# Patient Record
Sex: MALE | Race: WHITE
[De-identification: names, ages, dates, MRNs, and addresses within clinical notes are randomized per-mention and may not be internally consistent; named-entity substitution may affect disease eponyms.]

---

## 2018-02-21 ENCOUNTER — HOSPITAL ENCOUNTER (INPATIENT)
Dept: HOSPITAL 17 - HDOC | Age: 78
LOS: 6 days | Discharge: HOME | DRG: 812 | End: 2018-02-27
Attending: HOSPITALIST | Admitting: HOSPITALIST
Payer: COMMERCIAL

## 2018-02-21 VITALS
HEART RATE: 80 BPM | TEMPERATURE: 98 F | SYSTOLIC BLOOD PRESSURE: 94 MMHG | OXYGEN SATURATION: 98 % | RESPIRATION RATE: 20 BRPM | DIASTOLIC BLOOD PRESSURE: 57 MMHG

## 2018-02-21 VITALS
DIASTOLIC BLOOD PRESSURE: 81 MMHG | OXYGEN SATURATION: 100 % | TEMPERATURE: 98 F | RESPIRATION RATE: 20 BRPM | SYSTOLIC BLOOD PRESSURE: 114 MMHG | HEART RATE: 79 BPM

## 2018-02-21 VITALS
TEMPERATURE: 98.3 F | RESPIRATION RATE: 20 BRPM | DIASTOLIC BLOOD PRESSURE: 76 MMHG | OXYGEN SATURATION: 97 % | HEART RATE: 69 BPM | SYSTOLIC BLOOD PRESSURE: 132 MMHG

## 2018-02-21 VITALS
SYSTOLIC BLOOD PRESSURE: 125 MMHG | TEMPERATURE: 96.7 F | RESPIRATION RATE: 18 BRPM | OXYGEN SATURATION: 97 % | HEART RATE: 69 BPM | DIASTOLIC BLOOD PRESSURE: 81 MMHG

## 2018-02-21 VITALS
SYSTOLIC BLOOD PRESSURE: 112 MMHG | DIASTOLIC BLOOD PRESSURE: 63 MMHG | TEMPERATURE: 98.3 F | RESPIRATION RATE: 18 BRPM | OXYGEN SATURATION: 97 % | HEART RATE: 71 BPM

## 2018-02-21 VITALS
OXYGEN SATURATION: 95 % | DIASTOLIC BLOOD PRESSURE: 67 MMHG | SYSTOLIC BLOOD PRESSURE: 120 MMHG | RESPIRATION RATE: 20 BRPM | HEART RATE: 70 BPM | TEMPERATURE: 98.6 F

## 2018-02-21 VITALS
DIASTOLIC BLOOD PRESSURE: 76 MMHG | TEMPERATURE: 98.3 F | OXYGEN SATURATION: 97 % | HEART RATE: 71 BPM | RESPIRATION RATE: 20 BRPM | SYSTOLIC BLOOD PRESSURE: 132 MMHG

## 2018-02-21 VITALS
TEMPERATURE: 98.2 F | SYSTOLIC BLOOD PRESSURE: 119 MMHG | OXYGEN SATURATION: 99 % | HEART RATE: 78 BPM | DIASTOLIC BLOOD PRESSURE: 67 MMHG | RESPIRATION RATE: 18 BRPM

## 2018-02-21 VITALS
DIASTOLIC BLOOD PRESSURE: 74 MMHG | HEART RATE: 77 BPM | OXYGEN SATURATION: 97 % | SYSTOLIC BLOOD PRESSURE: 119 MMHG | TEMPERATURE: 98.6 F | RESPIRATION RATE: 18 BRPM

## 2018-02-21 VITALS
SYSTOLIC BLOOD PRESSURE: 122 MMHG | RESPIRATION RATE: 20 BRPM | TEMPERATURE: 97.8 F | DIASTOLIC BLOOD PRESSURE: 75 MMHG | OXYGEN SATURATION: 98 % | HEART RATE: 81 BPM

## 2018-02-21 VITALS — HEIGHT: 67 IN | BODY MASS INDEX: 28.75 KG/M2 | WEIGHT: 183.2 LBS

## 2018-02-21 VITALS — HEART RATE: 70 BPM

## 2018-02-21 VITALS — HEART RATE: 79 BPM

## 2018-02-21 VITALS — HEART RATE: 69 BPM

## 2018-02-21 DIAGNOSIS — N17.9: ICD-10-CM

## 2018-02-21 DIAGNOSIS — Z95.1: ICD-10-CM

## 2018-02-21 DIAGNOSIS — I25.10: ICD-10-CM

## 2018-02-21 DIAGNOSIS — D12.3: ICD-10-CM

## 2018-02-21 DIAGNOSIS — I25.2: ICD-10-CM

## 2018-02-21 DIAGNOSIS — M10.9: ICD-10-CM

## 2018-02-21 DIAGNOSIS — E11.9: ICD-10-CM

## 2018-02-21 DIAGNOSIS — D12.2: ICD-10-CM

## 2018-02-21 DIAGNOSIS — I35.0: ICD-10-CM

## 2018-02-21 DIAGNOSIS — K92.2: ICD-10-CM

## 2018-02-21 DIAGNOSIS — I50.9: ICD-10-CM

## 2018-02-21 DIAGNOSIS — D50.0: Primary | ICD-10-CM

## 2018-02-21 DIAGNOSIS — I11.0: ICD-10-CM

## 2018-02-21 DIAGNOSIS — E78.5: ICD-10-CM

## 2018-02-21 DIAGNOSIS — Z87.891: ICD-10-CM

## 2018-02-21 DIAGNOSIS — Z79.84: ICD-10-CM

## 2018-02-21 LAB
BASOPHILS # BLD AUTO: 0.1 TH/MM3 (ref 0–0.2)
BASOPHILS NFR BLD: 0.8 % (ref 0–2)
BUN SERPL-MCNC: 29 MG/DL (ref 7–18)
CALCIUM SERPL-MCNC: 8.7 MG/DL (ref 8.5–10.1)
CHLORIDE SERPL-SCNC: 103 MEQ/L (ref 98–107)
CREAT SERPL-MCNC: 1.84 MG/DL (ref 0.6–1.3)
EOSINOPHIL # BLD: 0.1 TH/MM3 (ref 0–0.4)
EOSINOPHIL NFR BLD: 0.9 % (ref 0–4)
ERYTHROCYTE [DISTWIDTH] IN BLOOD BY AUTOMATED COUNT: 21 % (ref 11.6–17.2)
GFR SERPLBLD BASED ON 1.73 SQ M-ARVRAT: 36 ML/MIN (ref 89–?)
GLUCOSE SERPL-MCNC: 119 MG/DL (ref 74–106)
HCO3 BLD-SCNC: 25.4 MEQ/L (ref 21–32)
HCT VFR BLD CALC: 18.8 % (ref 39–51)
HCT VFR BLD CALC: 18.9 % (ref 39–51)
HGB BLD-MCNC: 5.5 GM/DL (ref 13–17)
HGB BLD-MCNC: 5.6 GM/DL (ref 13–17)
INR PPP: 1.3 RATIO
LYMPHOCYTES # BLD AUTO: 0.6 TH/MM3 (ref 1–4.8)
LYMPHOCYTES NFR BLD AUTO: 7.3 % (ref 9–44)
MCH RBC QN AUTO: 22.1 PG (ref 27–34)
MCHC RBC AUTO-ENTMCNC: 29.4 % (ref 32–36)
MCV RBC AUTO: 75.2 FL (ref 80–100)
MONOCYTE #: 0.6 TH/MM3 (ref 0–0.9)
MONOCYTES NFR BLD: 7.5 % (ref 0–8)
NEUTROPHILS # BLD AUTO: 6.5 TH/MM3 (ref 1.8–7.7)
NEUTROPHILS NFR BLD AUTO: 83.5 % (ref 16–70)
PLATELET # BLD: 215 TH/MM3 (ref 150–450)
PMV BLD AUTO: 9.7 FL (ref 7–11)
PROTHROMBIN TIME: 12.7 SEC (ref 9.8–11.6)
RBC # BLD AUTO: 2.51 MIL/MM3 (ref 4.5–5.9)
SODIUM SERPL-SCNC: 138 MEQ/L (ref 136–145)
WBC # BLD AUTO: 7.8 TH/MM3 (ref 4–11)

## 2018-02-21 PROCEDURE — 82728 ASSAY OF FERRITIN: CPT

## 2018-02-21 PROCEDURE — 83615 LACTATE (LD) (LDH) ENZYME: CPT

## 2018-02-21 PROCEDURE — 85014 HEMATOCRIT: CPT

## 2018-02-21 PROCEDURE — 82805 BLOOD GASES W/O2 SATURATION: CPT

## 2018-02-21 PROCEDURE — 86920 COMPATIBILITY TEST SPIN: CPT

## 2018-02-21 PROCEDURE — 86850 RBC ANTIBODY SCREEN: CPT

## 2018-02-21 PROCEDURE — 86900 BLOOD TYPING SEROLOGIC ABO: CPT

## 2018-02-21 PROCEDURE — 86901 BLOOD TYPING SEROLOGIC RH(D): CPT

## 2018-02-21 PROCEDURE — 94664 DEMO&/EVAL PT USE INHALER: CPT

## 2018-02-21 PROCEDURE — P9016 RBC LEUKOCYTES REDUCED: HCPCS

## 2018-02-21 PROCEDURE — 30233N1 TRANSFUSION OF NONAUTOLOGOUS RED BLOOD CELLS INTO PERIPHERAL VEIN, PERCUTANEOUS APPROACH: ICD-10-PCS | Performed by: HOSPITALIST

## 2018-02-21 PROCEDURE — 82272 OCCULT BLD FECES 1-3 TESTS: CPT

## 2018-02-21 PROCEDURE — 36600 WITHDRAWAL OF ARTERIAL BLOOD: CPT

## 2018-02-21 PROCEDURE — 85025 COMPLETE CBC W/AUTO DIFF WBC: CPT

## 2018-02-21 PROCEDURE — 71045 X-RAY EXAM CHEST 1 VIEW: CPT

## 2018-02-21 PROCEDURE — 85610 PROTHROMBIN TIME: CPT

## 2018-02-21 PROCEDURE — 83540 ASSAY OF IRON: CPT

## 2018-02-21 PROCEDURE — 82607 VITAMIN B-12: CPT

## 2018-02-21 PROCEDURE — 93005 ELECTROCARDIOGRAM TRACING: CPT

## 2018-02-21 PROCEDURE — 85730 THROMBOPLASTIN TIME PARTIAL: CPT

## 2018-02-21 PROCEDURE — 88305 TISSUE EXAM BY PATHOLOGIST: CPT

## 2018-02-21 PROCEDURE — 36430 TRANSFUSION BLD/BLD COMPNT: CPT

## 2018-02-21 PROCEDURE — 82948 REAGENT STRIP/BLOOD GLUCOSE: CPT

## 2018-02-21 PROCEDURE — 80053 COMPREHEN METABOLIC PANEL: CPT

## 2018-02-21 PROCEDURE — 83550 IRON BINDING TEST: CPT

## 2018-02-21 PROCEDURE — 88312 SPECIAL STAINS GROUP 1: CPT

## 2018-02-21 PROCEDURE — 80048 BASIC METABOLIC PNL TOTAL CA: CPT

## 2018-02-21 PROCEDURE — C9113 INJ PANTOPRAZOLE SODIUM, VIA: HCPCS

## 2018-02-21 PROCEDURE — 83880 ASSAY OF NATRIURETIC PEPTIDE: CPT

## 2018-02-21 PROCEDURE — 85018 HEMOGLOBIN: CPT

## 2018-02-21 RX ADMIN — CARVEDILOL SCH MG: 6.25 TABLET, FILM COATED ORAL at 20:52

## 2018-02-21 RX ADMIN — INSULIN ASPART SCH: 100 INJECTION, SOLUTION INTRAVENOUS; SUBCUTANEOUS at 20:49

## 2018-02-21 NOTE — PD.CONS
HPI


Service


[facility]


Consult Requested By





Primary Care Physician


Non-Staff


Diagnoses:  


History of Present Illness


Mr. Cho is a 77-year-old male.  He has a history of coronary artery disease 

and aortic stenosis.  He was sent to his cardiologist due to complaints of 

recurrent dizziness on exertion and chest tightness.  She is found to have a 

hemoglobin of 5.5.  Patient reports a previous history of anemia.  He's 

uncertain of any previous cause such as GI bleed, bone marrow suppression, or 

blood cell destruction.  He cannot recall any bright red blood in his stools or 

any change in normal color and consistency.  Stool guaiac has been ordered and 

is pending.  Blood transfusion has been ordered and is in process.  Other 

medical conditions are diabetes mellitus type 2, hypertension, hyperlipidemia, 

and gout.  Evidence of acute kidney injury is present but could be related to 

anemia.





Review of Systems


Constitutional:  COMPLAINS OF: Fatigue, DENIES: Fever, Chills, Night Sweats


Eyes:  DENIES: Blurred vision, Diplopia, Eye inflammation


Respiratory:  COMPLAINS OF: Shortness of breath, DENIES: Cough, Wheezing, 

Hemoptysis


Cardiovascular:  COMPLAINS OF: Chest pain, Palpitations, Syncope, Dyspnea on 

Exertion


Gastrointestinal:  DENIES: Abdominal pain, Black stools, Bloody stools, 

Constipation, Diarrhea, Nausea, Vomiting


Musculoskeletal:  DENIES: Joint pain, Muscle aches, Stiffness


Integumentary:  COMPLAINS OF: Abnormal pigmentation, DENIES: Nail changes, 

Pruritus, Rash


Hematologic/lymphatic:  DENIES: Bruising, Lymphadenopathy


Immunologic/allergic:  DENIES: Eczema, Urticaria


Neurologic:  DENIES: Abnormal gait, Headache, Paresthesias


Psychiatric:  DENIES: Anxiety, Confusion, Hallucinations





Past Family Social History


Allergies:  


Coded Allergies:  


     No Known Allergies (Verified  Allergy, Unknown, 2/21/18)


Past Medical History


Anemia


Diabetes mellitus type 2


Hypertension


Hyperlipidemia


Gout


Aortic stenosis


Coronary artery disease


Past Surgical History


History of heart catheter


Reported Medications





Reported Meds & Active Scripts


Active


Reported


Vitamin B-12 (Cyanocobalamin) 1,000 Mcg Tab 1,000 Mcg PO DAILY


Simvastatin 80 Mg Tab 80 Mg PO DAILY


Metformin (Metformin HCl) 500 Mg Tab 500 Mg PO DAILY


     With a meal


Lisinopril 5 Mg Tab 5 Mg PO DAILY


Furosemide 20 Mg Tab 20 Mg PO DAILY


Folic Acid 0.4 Mg Tab 400 Mcg PO DAILY


Ferrous Sulfate 325 Mg (65 Mg Iron) Tablet 325 Mg PO DAILY


Colchicine 0.6 Mg Cap 0.5 Mg PO DAILY


Carvedilol 6.25 Mg Tab 6.25 Mg PO BID


Aspirin 81 Mg Chew 81 Mg CHEW DAILY


Family History


Diabetes mellitus type 2 and patient's mother


Social History


Distant history of smoking, patient quit in 1950 (at age 10) and does not 

presently smoke


No alcohol abuse


No illicit drug abuse





Physical Exam


Vital Signs





Vital Signs








  Date Time  Temp Pulse Resp B/P (MAP) Pulse Ox O2 Delivery O2 Flow Rate FiO2


 


2/21/18 16:28 98.6 70 20 120/67 95   


 


2/21/18 16:00  79      


 


2/21/18 15:52 98.2 78 18 119/67 99   


 


2/21/18 15:43 97.8 81 20 122/75 98   


 


2/21/18 14:14 98.0 79 20 114/81 (92) 100   


 


2/21/18 09:30 98.0 80 20 94/57 (69) 98   








Physical Exam


GENERAL: This is a well-nourished, well-developed patient, in no apparent 

distress.


SKIN: No rashes, ecchymoses or lesions. Cool and dry.


HEAD: Atraumatic. Normocephalic. No temporal or scalp tenderness.


EYES: Pupils equal round and reactive. Extraocular motions intact. No scleral 

icterus. No injection or drainage. 


ENT: Nose without bleeding, purulent drainage or septal hematoma. Throat 

without erythema, tonsillar hypertrophy or exudate. Uvula midline. Airway 

patent.


NECK: Trachea midline. No JVD or lymphadenopathy. Supple, nontender, no 

meningeal signs.


CARDIOVASCULAR: Regular rate and rhythm without murmurs, gallops, or rubs. 


RESPIRATORY: Clear to auscultation. Breath sounds equal bilaterally. No wheezes

, rales, or rhonchi.  


GASTROINTESTINAL: Abdomen soft, non-tender, nondistended. No hepato-splenomegaly

, or palpable masses. No guarding.


MUSCULOSKELETAL: Extremities without clubbing, cyanosis, or edema. No joint 

tenderness, effusion, or edema noted. No calf tenderness. Negative Homans sign 

bilaterally.


NEUROLOGICAL: Awake and alert. Cranial nerves II through XII intact.  Motor and 

sensory grossly within normal limits. Five out of 5 muscle strength in all 

muscle groups.  Normal speech.


Laboratory





Laboratory Tests








Test


  2/21/18


09:13 2/21/18


09:45


 


White Blood Count 7.8  


 


Red Blood Count 2.51  


 


Hemoglobin 5.6  5.5 


 


Hematocrit 18.9  18.8 


 


Mean Corpuscular Volume 75.2  


 


Mean Corpuscular Hemoglobin 22.1  


 


Mean Corpuscular Hemoglobin


Concent 29.4 


  


 


 


Red Cell Distribution Width 21.0  


 


Platelet Count 215  


 


Mean Platelet Volume 9.7  


 


Neutrophils (%) (Auto) 83.5  


 


Lymphocytes (%) (Auto) 7.3  


 


Monocytes (%) (Auto) 7.5  


 


Eosinophils (%) (Auto) 0.9  


 


Basophils (%) (Auto) 0.8  


 


Neutrophils # (Auto) 6.5  


 


Lymphocytes # (Auto) 0.6  


 


Monocytes # (Auto) 0.6  


 


Eosinophils # (Auto) 0.1  


 


Basophils # (Auto) 0.1  


 


CBC Comment DIFF FINAL  


 


Differential Comment   


 


Prothrombin Time 12.7  


 


Prothromb Time International


Ratio 1.3 


  


 


 


Activated Partial


Thromboplast Time 24.4 


  


 


 


Blood Urea Nitrogen 29  


 


Creatinine 1.84  


 


Random Glucose 119  


 


Calcium Level 8.7  


 


Sodium Level 138  


 


Potassium Level 4.7  


 


Chloride Level 103  


 


Carbon Dioxide Level 25.4  


 


Anion Gap 10  


 


Estimat Glomerular Filtration


Rate 36 


  


 








Result Diagram:  


2/21/18 0945                                                                   

             2/21/18 0913








Assessment and Plan


Problem List:  


(1) Severe anemia


ICD Code:  D64.9 - Anemia, unspecified


Assessment and Plan


77-year-old male admitted due to chest symptoms, with finding of severe anemia.





Severe anemia


Stool guaiac pending


Determine if patient is losing blood


If GI losses are determined then consult GI


Stool testing is negative for blood consult hematology for further workup in 

regards to chemotherapy lysis versus insufficient bone marrow production


Transfuse 2 units packed red blood cells


CBC in a.m.


Transfuse further packed red blood cells as needed


Follow CBC


This degree of anemia is likely responsible for his recent cardiac symptoms (

dizziness, dyspnea on exertion, chest tightness, increased edema)





Aortic stenosis


Coronary artery disease


(patient is uncertain today if he has a history of CHF, prior MI, or any 

history of arrhythmia)


Cardiology following


No change to baseline treatments


Cardiac symptoms are likely related primarily to anemia in the setting of 

underlying coronary artery disease





Diabetes mellitus type 2


Follow blood sugars


Insulin sliding scale


Diabetic diet





Hypertension


Continue baseline treatment


Follow blood pressures


Adjust treatments as needed





Hyperlipidemia


Continue present treatment


Follow as an outpatient





Gout


No change in baseline treatment


No exacerbations


Follow clinically





DVT prophylaxis


SCDs


Avoid blood thinners for now due to degree of anemia











Mateo Gonzalez MD Feb 21, 2018 18:06

## 2018-02-21 NOTE — EKG
Date Performed: 02/21/2018       Time Performed: 09:35:50

 

PTAGE:      77 years

 

EKG:      Sinus rhythm 

 

. LVH with secondary repolarization abnormality Extensive ST-T changes are probably due to ventricula
r hypertrophy Abnormal ECG 

 

NO PREVIOUS TRACING            

 

DOCTOR:   Lázaro Salazar  Interpretating Date/Time  02/21/2018 15:03:21

## 2018-02-22 VITALS
RESPIRATION RATE: 18 BRPM | TEMPERATURE: 98.1 F | HEART RATE: 70 BPM | OXYGEN SATURATION: 97 % | DIASTOLIC BLOOD PRESSURE: 68 MMHG | SYSTOLIC BLOOD PRESSURE: 121 MMHG

## 2018-02-22 VITALS — TEMPERATURE: 97.5 F | RESPIRATION RATE: 20 BRPM | HEART RATE: 79 BPM | OXYGEN SATURATION: 95 %

## 2018-02-22 VITALS
OXYGEN SATURATION: 97 % | RESPIRATION RATE: 16 BRPM | SYSTOLIC BLOOD PRESSURE: 123 MMHG | TEMPERATURE: 98.4 F | HEART RATE: 70 BPM | DIASTOLIC BLOOD PRESSURE: 63 MMHG

## 2018-02-22 VITALS
SYSTOLIC BLOOD PRESSURE: 130 MMHG | RESPIRATION RATE: 16 BRPM | HEART RATE: 70 BPM | OXYGEN SATURATION: 94 % | TEMPERATURE: 98.6 F | DIASTOLIC BLOOD PRESSURE: 78 MMHG

## 2018-02-22 VITALS
OXYGEN SATURATION: 97 % | SYSTOLIC BLOOD PRESSURE: 124 MMHG | TEMPERATURE: 97.8 F | RESPIRATION RATE: 18 BRPM | DIASTOLIC BLOOD PRESSURE: 64 MMHG

## 2018-02-22 VITALS
TEMPERATURE: 98 F | HEART RATE: 72 BPM | RESPIRATION RATE: 18 BRPM | OXYGEN SATURATION: 97 % | SYSTOLIC BLOOD PRESSURE: 119 MMHG | DIASTOLIC BLOOD PRESSURE: 60 MMHG

## 2018-02-22 VITALS
RESPIRATION RATE: 22 BRPM | SYSTOLIC BLOOD PRESSURE: 113 MMHG | DIASTOLIC BLOOD PRESSURE: 75 MMHG | TEMPERATURE: 97 F | HEART RATE: 81 BPM | OXYGEN SATURATION: 98 %

## 2018-02-22 VITALS
DIASTOLIC BLOOD PRESSURE: 68 MMHG | SYSTOLIC BLOOD PRESSURE: 121 MMHG | TEMPERATURE: 98.1 F | RESPIRATION RATE: 18 BRPM | OXYGEN SATURATION: 97 % | HEART RATE: 71 BPM

## 2018-02-22 VITALS
DIASTOLIC BLOOD PRESSURE: 62 MMHG | TEMPERATURE: 98.1 F | HEART RATE: 86 BPM | SYSTOLIC BLOOD PRESSURE: 126 MMHG | RESPIRATION RATE: 18 BRPM | OXYGEN SATURATION: 98 %

## 2018-02-22 VITALS
RESPIRATION RATE: 20 BRPM | TEMPERATURE: 98.8 F | HEART RATE: 82 BPM | DIASTOLIC BLOOD PRESSURE: 75 MMHG | OXYGEN SATURATION: 96 % | SYSTOLIC BLOOD PRESSURE: 134 MMHG

## 2018-02-22 VITALS — HEART RATE: 77 BPM

## 2018-02-22 VITALS — HEART RATE: 68 BPM

## 2018-02-22 VITALS — HEART RATE: 84 BPM

## 2018-02-22 VITALS — HEART RATE: 75 BPM

## 2018-02-22 VITALS — HEART RATE: 74 BPM

## 2018-02-22 LAB
% SATURATION IRON PROFILE: 26.1 % (ref 20–50)
ALBUMIN SERPL-MCNC: 3.4 GM/DL (ref 3.4–5)
ALP SERPL-CCNC: 68 U/L (ref 45–117)
ALT SERPL-CCNC: 19 U/L (ref 12–78)
AST SERPL-CCNC: 16 U/L (ref 15–37)
BASOPHILS # BLD AUTO: 0.1 TH/MM3 (ref 0–0.2)
BASOPHILS NFR BLD: 0.7 % (ref 0–2)
BILIRUB SERPL-MCNC: 2.2 MG/DL (ref 0.2–1)
BUN SERPL-MCNC: 28 MG/DL (ref 7–18)
CALCIUM SERPL-MCNC: 8.1 MG/DL (ref 8.5–10.1)
CHLORIDE SERPL-SCNC: 105 MEQ/L (ref 98–107)
CREAT SERPL-MCNC: 1.68 MG/DL (ref 0.6–1.3)
EOSINOPHIL # BLD: 0.1 TH/MM3 (ref 0–0.4)
EOSINOPHIL NFR BLD: 0.8 % (ref 0–4)
ERYTHROCYTE [DISTWIDTH] IN BLOOD BY AUTOMATED COUNT: 20.6 % (ref 11.6–17.2)
GFR SERPLBLD BASED ON 1.73 SQ M-ARVRAT: 40 ML/MIN (ref 89–?)
GLUCOSE SERPL-MCNC: 114 MG/DL (ref 74–106)
HCO3 BLD-SCNC: 24.7 MEQ/L (ref 21–32)
HCT VFR BLD CALC: 24.2 % (ref 39–51)
HGB BLD-MCNC: 7.7 GM/DL (ref 13–17)
IRON (FE): 82 MCG/DL (ref 65–175)
LYMPHOCYTES # BLD AUTO: 0.8 TH/MM3 (ref 1–4.8)
LYMPHOCYTES NFR BLD AUTO: 8.6 % (ref 9–44)
MCH RBC QN AUTO: 24.7 PG (ref 27–34)
MCHC RBC AUTO-ENTMCNC: 31.8 % (ref 32–36)
MCV RBC AUTO: 77.6 FL (ref 80–100)
MONOCYTE #: 0.8 TH/MM3 (ref 0–0.9)
MONOCYTES NFR BLD: 8.7 % (ref 0–8)
NEUTROPHILS # BLD AUTO: 7.4 TH/MM3 (ref 1.8–7.7)
NEUTROPHILS NFR BLD AUTO: 81.2 % (ref 16–70)
PLATELET # BLD: 206 TH/MM3 (ref 150–450)
PMV BLD AUTO: 10.1 FL (ref 7–11)
PROT SERPL-MCNC: 5.9 GM/DL (ref 6.4–8.2)
RBC # BLD AUTO: 3.12 MIL/MM3 (ref 4.5–5.9)
SODIUM SERPL-SCNC: 139 MEQ/L (ref 136–145)
TIBC SERPL-MCNC: 314 MCG/DL (ref 250–450)
VIT B12 SERPL-MCNC: 1107 PG/ML (ref 193–986)
WBC # BLD AUTO: 9.1 TH/MM3 (ref 4–11)

## 2018-02-22 RX ADMIN — FERROUS SULFATE TAB 325 MG (65 MG ELEMENTAL FE) SCH MG: 325 (65 FE) TAB at 08:19

## 2018-02-22 RX ADMIN — INSULIN ASPART SCH: 100 INJECTION, SOLUTION INTRAVENOUS; SUBCUTANEOUS at 17:00

## 2018-02-22 RX ADMIN — PHENYTOIN SODIUM SCH MLS/HR: 50 INJECTION INTRAMUSCULAR; INTRAVENOUS at 09:00

## 2018-02-22 RX ADMIN — INSULIN ASPART SCH: 100 INJECTION, SOLUTION INTRAVENOUS; SUBCUTANEOUS at 08:00

## 2018-02-22 RX ADMIN — PHENYTOIN SODIUM SCH MLS/HR: 50 INJECTION INTRAMUSCULAR; INTRAVENOUS at 08:30

## 2018-02-22 RX ADMIN — CARVEDILOL SCH MG: 6.25 TABLET, FILM COATED ORAL at 08:19

## 2018-02-22 RX ADMIN — INSULIN ASPART SCH: 100 INJECTION, SOLUTION INTRAVENOUS; SUBCUTANEOUS at 12:00

## 2018-02-22 RX ADMIN — CARVEDILOL SCH MG: 6.25 TABLET, FILM COATED ORAL at 21:50

## 2018-02-22 RX ADMIN — INSULIN ASPART SCH: 100 INJECTION, SOLUTION INTRAVENOUS; SUBCUTANEOUS at 21:00

## 2018-02-22 NOTE — PD.CARD.PN
Subjective


Subjective Remarks


Color looks better





No chest pain/SOB





Little dizzy





Objective


Medications





Current Medications








 Medications


  (Trade)  Dose


 Ordered  Sig/Ramu


 Route  Start Time


 Stop Time Status Last Admin


 


 Sodium Chloride  1,000 ml @ 


 30 mls/hr  Q24H


 IV  2/21/18 09:00


    2/22/18 08:30


 


 


  (Tylenol)  650 mg  Q4H  PRN


 PO  2/21/18 10:15


     


 


 


  (Zofran Inj)  4 mg  Q6H  PRN


 IVP  2/21/18 10:15


     


 


 


  (Narcan Inj)  0.4 mg  UNSCH  PRN


 IV PUSH  2/21/18 10:15


     


 


 


  (Coreg)  6.25 mg  BID


 PO  2/21/18 21:00


    2/22/18 08:19


 


 


  (Ferrous Sulfate)  325 mg  DAILY


 PO  2/22/18 09:00


    2/22/18 08:19


 


 


  (D50w (Vial) Inj)  50 ml  UNSCH  PRN


 IV PUSH  2/21/18 18:00


     


 


 


  (Glucagon Inj)  1 mg  UNSCH  PRN


 OTHER  2/21/18 18:00


     


 


 


  (NovoLOG


 SUPPLEMENTAL


 SCALE)  1  ACHS SLIDING  SCALE


 SQ  2/21/18 21:00


     


 


 


 Sodium Chloride  250 ml @ 


 15 mls/hr  ONCE  ONCE


 IV  2/22/18 12:00


 2/23/18 04:39  2/22/18 12:47


 


 


  (Tylenol)  650 mg  Q4H  PRN


 PO  2/22/18 12:00


     


 


 


  (Benadryl)  25 mg  Q4H  PRN


 PO  2/22/18 12:00


     


 


 


  (Tylenol)  650 mg  Q4H  PRN


 PO  2/22/18 12:00


     


 


 


  (Restoril)  15 mg  HS  PRN


 PO  2/22/18 12:00


     


 


 


  (Colchicine)  0.5 mg  DAILY


 PO  2/23/18 09:00


   UNV  


 


 


  (Lasix)  20 mg  DAILY


 PO  2/23/18 09:00


   UNV  


 


 


  (Glucophage)  500 mg  DAILY


 PO  2/23/18 09:00


   UNV  


 


 


 Non-Formulary


 Medication  80 mg  DAILY


 PO  2/23/18 09:00


   UNV  


 








Vital Signs / I&O





Vital Signs








  Date Time  Temp Pulse Resp B/P (MAP) Pulse Ox O2 Delivery O2 Flow Rate FiO2


 


2/22/18 13:34 97.5 79 20  95   


 


2/22/18 13:21 97.8  18 124/64 97   


 


2/22/18 11:00 98.4 70 16 123/63 (83) 97   


 


2/22/18 08:00  68      


 


2/22/18 07:40 98.6 70 16 130/78 (95) 94   


 


2/22/18 04:00 98.1 86 18 126/62 (83) 98   


 


2/22/18 03:52  75      


 


2/22/18 00:00 98.0 72 18 119/60 (79) 97   


 


2/21/18 23:49  70      


 


2/21/18 20:00 98.3 71 18 112/63 (79) 97   


 


2/21/18 19:49  69      


 


2/21/18 19:06 98.3 71 20 132/76 97   


 


2/21/18 18:56 96.7 69 18 125/81 97   


 


2/21/18 17:00 98.6 77 18 119/74 97   


 


2/21/18 16:28 98.6 70 20 120/67 95   


 


2/21/18 16:00  79      


 


2/21/18 15:52 98.2 78 18 119/67 99   


 


2/21/18 15:43 97.8 81 20 122/75 98   


 


2/21/18 14:14 98.0 79 20 114/81 (92) 100   


 


2/21/18 14:14 98.0 79 20 114/81 (92) 100   














I/O      


 


 2/21/18 2/21/18 2/21/18 2/22/18 2/22/18 2/22/18





 07:00 15:00 23:00 07:00 15:00 23:00


 


Intake Total   1270 ml 450 ml  


 


Output Total    600 ml  


 


Balance   1270 ml -150 ml  


 


      


 


Intake Oral   420 ml 450 ml  


 


IV Total   50 ml   


 


Packed Cells   800 ml   


 


Output Urine Total    600 ml  


 


# Voids   4   


 


# Bowel Movements   2   








Physical Exam


GENERAL: NAD, AAOx3, less pale


SKIN: Warm and dry.


HEAD: Atraumatic. Normocephalic. 


EYES: Pupils equal and round. No scleral icterus. No injection or drainage. 


ENT: No nasal bleeding or discharge.  Mucous membranes pink and moist.


NECK: Trachea midline. No JVD. 


CARDIOVASCULAR: Regular rate and rhythm.  3/6 crescendo-decrescendo to the RSB


RESPIRATORY: No accessory muscle use. Clear to auscultation. Breath sounds 

equal bilaterally. 


GASTROINTESTINAL: Abdomen soft, non-tender, nondistended. Hepatic and splenic 

margins not palpable. 


MUSCULOSKELETAL: Extremities without clubbing, cyanosis, or edema. No obvious 

deformities. 


NEUROLOGICAL: Awake and alert. No obvious cranial nerve deficits.  Motor 

grossly within normal limits. Five out of 5 muscle strength in the arms and 

legs.  Normal speech.


PSYCHIATRIC: Appropriate mood and affect; insight and judgment normal.


Laboratory





Laboratory Tests








Test


  2/22/18


06:32


 


White Blood Count 9.1 TH/MM3 


 


Red Blood Count 3.12 MIL/MM3 


 


Hemoglobin 7.7 GM/DL 


 


Hematocrit 24.2 % 


 


Mean Corpuscular Volume 77.6 FL 


 


Mean Corpuscular Hemoglobin 24.7 PG 


 


Mean Corpuscular Hemoglobin


Concent 31.8 % 


 


 


Red Cell Distribution Width 20.6 % 


 


Platelet Count 206 TH/MM3 


 


Mean Platelet Volume 10.1 FL 


 


Neutrophils (%) (Auto) 81.2 % 


 


Lymphocytes (%) (Auto) 8.6 % 


 


Monocytes (%) (Auto) 8.7 % 


 


Eosinophils (%) (Auto) 0.8 % 


 


Basophils (%) (Auto) 0.7 % 


 


Neutrophils # (Auto) 7.4 TH/MM3 


 


Lymphocytes # (Auto) 0.8 TH/MM3 


 


Monocytes # (Auto) 0.8 TH/MM3 


 


Eosinophils # (Auto) 0.1 TH/MM3 


 


Basophils # (Auto) 0.1 TH/MM3 


 


CBC Comment DIFF FINAL 


 


Differential Comment  


 


Blood Urea Nitrogen 28 MG/DL 


 


Creatinine 1.68 MG/DL 


 


Random Glucose 114 MG/DL 


 


Total Protein 5.9 GM/DL 


 


Albumin 3.4 GM/DL 


 


Calcium Level 8.1 MG/DL 


 


Alkaline Phosphatase 68 U/L 


 


Aspartate Amino Transf


(AST/SGOT) 16 U/L 


 


 


Alanine Aminotransferase


(ALT/SGPT) 19 U/L 


 


 


Total Bilirubin 2.2 MG/DL 


 


Sodium Level 139 MEQ/L 


 


Potassium Level 4.4 MEQ/L 


 


Chloride Level 105 MEQ/L 


 


Carbon Dioxide Level 24.7 MEQ/L 


 


Anion Gap 9 MEQ/L 


 


Estimat Glomerular Filtration


Rate 40 ML/MIN 


 


 


Iron Level 82 MCG/DL 


 


Lactate Dehydrogenase 263 U/L 











Assessment and Plan


Problem List:  


(1) Severe anemia


ICD Codes:  D64.9 - Anemia, unspecified


(2) Aortic stenosis


ICD Codes:  I35.0 - Nonrheumatic aortic (valve) stenosis


(3) JOSIE (acute kidney injury)


ICD Codes:  N17.9 - Acute kidney failure, unspecified


Assessment and Plan


1) Severe anemia


   Work up per primary team, consider GI vs Hematology


2) Aortic stenosis


   Work up on hold due to anemia


   Most likely reschedule outpt


3) CP/SOB/Dizziness


   Most likely due to anemia


4) JOSIE


   Secondary to anemia











Paul Bo DO Feb 22, 2018 13:52

## 2018-02-22 NOTE — HHI.PR
Subjective


Remarks


Follow-up severe anemia


02/22/18-patient seen and examined; transfused 2 units PRBC, still complains of 

dizziness with some SOB. Denies any CP





Objective


Vitals





Vital Signs








  Date Time  Temp Pulse Resp B/P (MAP) Pulse Ox O2 Delivery O2 Flow Rate FiO2


 


2/22/18 11:00 98.4 70 16 123/63 (83) 97   


 


2/22/18 08:00  68      


 


2/22/18 07:40 98.6 70 16 130/78 (95) 94   


 


2/22/18 04:00 98.1 86 18 126/62 (83) 98   


 


2/22/18 03:52  75      


 


2/22/18 00:00 98.0 72 18 119/60 (79) 97   


 


2/21/18 23:49  70      


 


2/21/18 20:00 98.3 71 18 112/63 (79) 97   


 


2/21/18 19:49  69      


 


2/21/18 19:06 98.3 71 20 132/76 97   


 


2/21/18 18:56 96.7 69 18 125/81 97   


 


2/21/18 17:00 98.6 77 18 119/74 97   


 


2/21/18 16:28 98.6 70 20 120/67 95   


 


2/21/18 16:00  79      


 


2/21/18 15:52 98.2 78 18 119/67 99   


 


2/21/18 15:43 97.8 81 20 122/75 98   


 


2/21/18 14:14 98.0 79 20 114/81 (92) 100   


 


2/21/18 14:14 98.0 79 20 114/81 (92) 100   














I/O      


 


 2/21/18 2/21/18 2/21/18 2/22/18 2/22/18 2/22/18





 07:00 15:00 23:00 07:00 15:00 23:00


 


Intake Total   1270 ml 450 ml  


 


Output Total    600 ml  


 


Balance   1270 ml -150 ml  


 


      


 


Intake Oral   420 ml 450 ml  


 


IV Total   50 ml   


 


Packed Cells   800 ml   


 


Output Urine Total    600 ml  


 


# Voids   4   


 


# Bowel Movements   2   








Result Diagram:  


2/22/18 0632                                                                   

             2/22/18 0632





Objective Remarks


GENERAL: NAD


SKIN: Warm and dry.


HEAD: Normocephalic.


EYES: No scleral icterus. No injection or drainage. 


NECK: Supple, trachea midline. No JVD or lymphadenopathy.


CARDIOVASCULAR: Regular rate and rhythm with +2/6 murmur


RESPIRATORY: Breath sounds equal bilaterally. No accessory muscle use.


GASTROINTESTINAL: Abdomen soft, non-tender, nondistended. 


MUSCULOSKELETAL: No cyanosis, or edema. 


BACK: Nontender without obvious deformity. No CVA tenderness.








A/P


Problem List:  


(1) Severe anemia


ICD Code:  D64.9 - Anemia, unspecified


Assessment and Plan


Severe anemia


Transfused 2 units packed red blood cells


Will transfuse 1 more unit today 2/22/18


check Hemoccult, TIBC, B12, LDH


Consider GI consult vs Hematology 





Aortic stenosis


Coronary artery disease


(patient is uncertain today if he has a history of CHF, prior MI, or any 

history of arrhythmia)


Cardiology following


No change to baseline treatments


Cardiac symptoms are likely related primarily to anemia in the setting of 

underlying coronary artery disease





Acute renal failure


Continue with Gentle IVF hydration





Diabetes mellitus type 2


Insulin sliding scale


Diabetic diet





Hypertension


Continue baseline treatment





Hyperlipidemia


Continue present treatment





Gout


No change in baseline treatment





DVT prophylaxis


SCDs


Avoid blood thinners for now due to degree of anemia











Arash Galvez MD Feb 22, 2018 12:09

## 2018-02-22 NOTE — MB
cc:

PAUL YA DO

****

 

 

DATE OF CONSULTATION

2/21/2018

 

REASON FOR CONSULTATION

Aortic stenosis

 

HISTORY OF PRESENT ILLNESS

Tang Cho is a pleasant 77-year-old male who was seen in the office and

found to have significant aortic stenosis.  Because of this, he was recommended

right and left heart catheterization.  In discussing this over the phone with

him, he states that he has been getting dizziness as well as shortness of

breath upon exertion and chest tightness.  On arrival today for his cardiac

catheterization, he had a hemoglobin 5.7.  Repeat lab test was sent and his

hemoglobin was 5.5.  Because of this, his procedure was cancelled and he was

admitted to the hospital team for blood transfusion and further workup.

 

PAST MEDICAL HISTORY

1. Anemia

2. Diabetes mellitus type 2

3. Hypertension

4. Hyperlipidemia

5. Gout

6. Aortic stenosis

7. Coronary artery disease

 

PAST SURGICAL HISTORY

History of cardiac catheterization.

 

ALLERGIES

NO KNOWN DRUG ALLERGIES.

 

MEDICATIONS

1. Iron 325 mg daily

2. Zocor 80 mg daily

3. Coreg 6.25 mg b.i.d.

4. Lisinopril 5 mg daily

5. Aspirin 81 mg daily

6. Lasix 20 mg daily

7. Metformin 500 mg daily

8. Vitamin B12 1000 mcg daily

 

9. Folic acid 400 mcg daily

10. Colchicine 0.5 mg daily.

 

FAMILY HISTORY

Denies premature coronary artery disease or sudden cardiac death within the

family.

 

SOCIAL HISTORY

The patient previously smoked, but quit a number years ago.  Denies alcohol or

drug abuse.

 

REVIEW OF SYSTEMS

14-systems were reviewed as above.  Pertinent positives and negatives as above

otherwise negative.

 

PHYSICAL EXAMINATION

VITAL SIGNS:  Temperature 98.0, heart rate 80, blood pressure 94/57,

respirations 20, pulse ox 98% on room air.

GENERAL:  The patient appears pale, no acute distress, alert awake and oriented

x3.

HEAD, EYES, EARS, NOSE, AND THROAT:  Extraocular muscles intact.  Mucous

membranes moist.

NECK:  Supple.  No JVD at 45 degrees.  No carotid bruits heard bilaterally.

Carotid upstroke is brisk in nature.

HEART:  Regular rate and rhythm.  Positive crescendo-decrescendo murmur to the

right sternal border with dampening of S2.

LUNGS:  Clear to auscultation bilaterally.  No wheezes, rales or rhonchi.

ABDOMEN:  Soft, nontender and nondistended.  No organomegaly noted.

EXTREMITIES:  Show trace edema bilaterally.

NEUROLOGIC:  No focal deficits.

SKIN:  Warm, dry and intact.

OSTEOPATHIC:  No kyphoscoliosis, lordosis or paraspinal tender points.

 

LABORATORY FINDINGS

Hemoglobin 5.6, hematocrit 18.9, platelets 215.

 

Potassium 4.7, BUN 29, creatinine 1.84.

 

Electrocardiogram, sinus rhythm, LVH with secondary ST-T wave changes.

 

IMPRESSION

1. Severe aortic stenosis by echocardiogram

2. Severe anemia

3. History of coronary artery disease.

4. Diabetes mellitus type 2

5. Hypertension

6. Hyperlipidemia

7. Gout

 

RECOMMENDATIONS

1. Mr. Cho's cardiac catheterization will be placed on hold at this time for

     further workup for his anemia.

2. He will be admitted to the hospital and be transfused red blood cells.

3. A hemoccult has been sent for checking blood in his stool.

4. Further recommendations will be made based on hospital course.

 

Thank you for allowing me to see Tang Cho.  If there are any questions,

please do not hesitate to call.

 

 

 

                              _________________________________

                              Paul Ya DO

 

 

 

VGPJ/DJL

D:  2/22/2018/12:05 AM

T:  2/22/2018/6:28 AM

Visit #:  [id number]

Job #:  [id number]

## 2018-02-23 VITALS
SYSTOLIC BLOOD PRESSURE: 121 MMHG | DIASTOLIC BLOOD PRESSURE: 72 MMHG | OXYGEN SATURATION: 99 % | RESPIRATION RATE: 32 BRPM | TEMPERATURE: 98.9 F | HEART RATE: 77 BPM

## 2018-02-23 VITALS — OXYGEN SATURATION: 94 %

## 2018-02-23 VITALS
RESPIRATION RATE: 28 BRPM | TEMPERATURE: 98.2 F | SYSTOLIC BLOOD PRESSURE: 137 MMHG | DIASTOLIC BLOOD PRESSURE: 71 MMHG | HEART RATE: 90 BPM | OXYGEN SATURATION: 99 %

## 2018-02-23 VITALS
RESPIRATION RATE: 26 BRPM | HEART RATE: 97 BPM | OXYGEN SATURATION: 94 % | DIASTOLIC BLOOD PRESSURE: 91 MMHG | TEMPERATURE: 98.3 F | SYSTOLIC BLOOD PRESSURE: 159 MMHG

## 2018-02-23 VITALS
OXYGEN SATURATION: 100 % | SYSTOLIC BLOOD PRESSURE: 123 MMHG | HEART RATE: 72 BPM | TEMPERATURE: 97.2 F | RESPIRATION RATE: 18 BRPM | DIASTOLIC BLOOD PRESSURE: 77 MMHG

## 2018-02-23 VITALS
OXYGEN SATURATION: 100 % | TEMPERATURE: 98 F | DIASTOLIC BLOOD PRESSURE: 70 MMHG | RESPIRATION RATE: 24 BRPM | HEART RATE: 72 BPM | SYSTOLIC BLOOD PRESSURE: 130 MMHG

## 2018-02-23 VITALS — HEART RATE: 76 BPM

## 2018-02-23 VITALS
DIASTOLIC BLOOD PRESSURE: 79 MMHG | HEART RATE: 80 BPM | TEMPERATURE: 98.7 F | SYSTOLIC BLOOD PRESSURE: 137 MMHG | RESPIRATION RATE: 21 BRPM | OXYGEN SATURATION: 97 %

## 2018-02-23 VITALS — HEART RATE: 73 BPM

## 2018-02-23 VITALS — HEART RATE: 67 BPM

## 2018-02-23 VITALS — HEART RATE: 85 BPM

## 2018-02-23 LAB
BASOPHILS # BLD AUTO: 0.1 TH/MM3 (ref 0–0.2)
BASOPHILS NFR BLD: 0.4 % (ref 0–2)
BUN SERPL-MCNC: 26 MG/DL (ref 7–18)
CALCIUM SERPL-MCNC: 8.7 MG/DL (ref 8.5–10.1)
CHLORIDE SERPL-SCNC: 102 MEQ/L (ref 98–107)
CREAT SERPL-MCNC: 1.56 MG/DL (ref 0.6–1.3)
EOSINOPHIL # BLD: 0 TH/MM3 (ref 0–0.4)
EOSINOPHIL NFR BLD: 0.1 % (ref 0–4)
ERYTHROCYTE [DISTWIDTH] IN BLOOD BY AUTOMATED COUNT: 21.1 % (ref 11.6–17.2)
GFR SERPLBLD BASED ON 1.73 SQ M-ARVRAT: 43 ML/MIN (ref 89–?)
GLUCOSE SERPL-MCNC: 150 MG/DL (ref 74–106)
HCO3 BLD-SCNC: 26.2 MEQ/L (ref 21–32)
HCT VFR BLD CALC: 33.5 % (ref 39–51)
HGB BLD-MCNC: 10.5 GM/DL (ref 13–17)
LYMPHOCYTES # BLD AUTO: 0.5 TH/MM3 (ref 1–4.8)
LYMPHOCYTES NFR BLD AUTO: 2.8 % (ref 9–44)
MCH RBC QN AUTO: 24.6 PG (ref 27–34)
MCHC RBC AUTO-ENTMCNC: 31.2 % (ref 32–36)
MCV RBC AUTO: 79 FL (ref 80–100)
MONOCYTE #: 1 TH/MM3 (ref 0–0.9)
MONOCYTES NFR BLD: 6 % (ref 0–8)
NEUTROPHILS # BLD AUTO: 14.7 TH/MM3 (ref 1.8–7.7)
NEUTROPHILS NFR BLD AUTO: 90.7 % (ref 16–70)
PLATELET # BLD: 267 TH/MM3 (ref 150–450)
PMV BLD AUTO: 9.8 FL (ref 7–11)
RBC # BLD AUTO: 4.24 MIL/MM3 (ref 4.5–5.9)
SODIUM SERPL-SCNC: 136 MEQ/L (ref 136–145)
WBC # BLD AUTO: 16.2 TH/MM3 (ref 4–11)

## 2018-02-23 RX ADMIN — CARVEDILOL SCH MG: 6.25 TABLET, FILM COATED ORAL at 20:31

## 2018-02-23 RX ADMIN — PANTOPRAZOLE SODIUM SCH MG: 40 INJECTION, POWDER, FOR SOLUTION INTRAVENOUS at 10:03

## 2018-02-23 RX ADMIN — INSULIN ASPART SCH: 100 INJECTION, SOLUTION INTRAVENOUS; SUBCUTANEOUS at 08:12

## 2018-02-23 RX ADMIN — FERROUS SULFATE TAB 325 MG (65 MG ELEMENTAL FE) SCH MG: 325 (65 FE) TAB at 08:11

## 2018-02-23 RX ADMIN — INSULIN ASPART SCH: 100 INJECTION, SOLUTION INTRAVENOUS; SUBCUTANEOUS at 08:00

## 2018-02-23 RX ADMIN — INSULIN ASPART SCH: 100 INJECTION, SOLUTION INTRAVENOUS; SUBCUTANEOUS at 12:00

## 2018-02-23 RX ADMIN — PANTOPRAZOLE SODIUM SCH MG: 40 INJECTION, POWDER, FOR SOLUTION INTRAVENOUS at 20:31

## 2018-02-23 RX ADMIN — INSULIN ASPART SCH: 100 INJECTION, SOLUTION INTRAVENOUS; SUBCUTANEOUS at 20:36

## 2018-02-23 RX ADMIN — CARVEDILOL SCH MG: 6.25 TABLET, FILM COATED ORAL at 08:11

## 2018-02-23 RX ADMIN — PHENYTOIN SODIUM SCH MLS/HR: 50 INJECTION INTRAMUSCULAR; INTRAVENOUS at 08:12

## 2018-02-23 RX ADMIN — COLCHICINE SCH MG: 0.6 TABLET, FILM COATED ORAL at 08:12

## 2018-02-23 RX ADMIN — INSULIN ASPART SCH: 100 INJECTION, SOLUTION INTRAVENOUS; SUBCUTANEOUS at 16:40

## 2018-02-23 RX ADMIN — ATORVASTATIN CALCIUM SCH MG: 40 TABLET, FILM COATED ORAL at 08:11

## 2018-02-23 NOTE — PD.CARD.PN
Subjective


Subjective Remarks


Patient was seen earlier today, late entry





Color looks better





No chest pain





Objective


Medications





Current Medications








 Medications


  (Trade)  Dose


 Ordered  Sig/Ramu


 Route  Start Time


 Stop Time Status Last Admin


 


 Sodium Chloride  1,000 ml @ 


 30 mls/hr  Q24H


 IV  2/21/18 09:00


    2/22/18 08:30


 


 


  (Zofran Inj)  4 mg  Q6H  PRN


 IVP  2/21/18 10:15


     


 


 


  (Narcan Inj)  0.4 mg  UNSCH  PRN


 IV PUSH  2/21/18 10:15


     


 


 


  (Coreg)  6.25 mg  BID


 PO  2/21/18 21:00


    2/23/18 08:11


 


 


  (Ferrous Sulfate)  325 mg  DAILY


 PO  2/22/18 09:00


    2/23/18 08:11


 


 


  (D50w (Vial) Inj)  50 ml  UNSCH  PRN


 IV PUSH  2/21/18 18:00


     


 


 


  (Glucagon Inj)  1 mg  UNSCH  PRN


 OTHER  2/21/18 18:00


     


 


 


  (NovoLOG


 SUPPLEMENTAL


 SCALE)  1  ACHS SLIDING  SCALE


 SQ  2/21/18 21:00


     


 


 


  (Tylenol)  650 mg  Q4H  PRN


 PO  2/22/18 12:00


     


 


 


  (Benadryl)  25 mg  Q4H  PRN


 PO  2/22/18 12:00


     


 


 


  (Tylenol)  650 mg  Q4H  PRN


 PO  2/22/18 12:00


     


 


 


  (Restoril)  15 mg  HS  PRN


 PO  2/22/18 12:00


     


 


 


  (Colchicine)  0.6 mg  DAILY


 PO  2/23/18 09:00


    2/23/18 08:12


 


 


  (Lasix)  20 mg  DAILY


 PO  2/23/18 09:00


    2/23/18 08:11


 


 


  (Glucophage)  500 mg  DAILY


 PO  2/23/18 09:00


    2/23/18 08:11


 


 


  (Lipitor)  40 mg  DAILY


 PO  2/23/18 09:00


    2/23/18 08:11


 


 


  (Duoneb Neb)  1 ampule  Q4HR NEB  PRN


 NEB  2/23/18 04:45


     


 


 


  (Protonix Inj)  40 mg  Q12H


 IV PUSH  2/23/18 09:00


    2/23/18 10:03


 








Vital Signs / I&O





Vital Signs








  Date Time  Temp Pulse Resp B/P (MAP) Pulse Ox O2 Delivery O2 Flow Rate FiO2


 


2/23/18 12:00 98.9 77 32 121/72 (88) 99   


 


2/23/18 08:00 98.2 90 28 137/71 (93) 99   


 


2/23/18 06:00      Nasal Cannula 3.00 


 


2/23/18 05:00     94 Nasal Cannula 3.00 


 


2/23/18 04:00  94      


 


2/23/18 04:00 98.3 97 26 159/91 (113) 94   


 


2/23/18 00:00 98.7 80 21 137/79 (98) 97   


 


2/22/18 23:49  84      


 


2/22/18 20:04  77      


 


2/22/18 20:00 98.8 82 20 134/75 (94) 96   














I/O      


 


 2/22/18 2/22/18 2/22/18 2/23/18 2/23/18 2/23/18





 06:59 14:59 22:59 06:59 14:59 22:59


 


Intake Total 450 ml 1000 ml 990 ml 200 ml  


 


Output Total 600 ml  600 ml 850 ml  


 


Balance -150 ml 1000 ml 390 ml -650 ml  


 


      


 


Intake Oral 450 ml  480 ml 200 ml  


 


IV Total  1000 ml 110 ml   


 


Packed Cells   400 ml   


 


Output Urine Total 600 ml  600 ml 850 ml  


 


# Bowel Movements   1 2  








Physical Exam


GENERAL: NAD, AAOx3, less pale


SKIN: Warm and dry.


HEAD: Atraumatic. Normocephalic. 


EYES: Pupils equal and round. No scleral icterus. No injection or drainage. 


ENT: No nasal bleeding or discharge.  Mucous membranes pink and moist.


NECK: Trachea midline. No JVD. 


CARDIOVASCULAR: Regular rate and rhythm.  3/6 crescendo-decrescendo to the RSB


RESPIRATORY: No accessory muscle use. Clear to auscultation. Breath sounds 

equal bilaterally. 


GASTROINTESTINAL: Abdomen soft, non-tender, nondistended. Hepatic and splenic 

margins not palpable. 


MUSCULOSKELETAL: Extremities without clubbing, cyanosis, or edema. No obvious 

deformities. 


NEUROLOGICAL: Awake and alert. No obvious cranial nerve deficits.  Motor 

grossly within normal limits. Five out of 5 muscle strength in the arms and 

legs.  Normal speech.


PSYCHIATRIC: Appropriate mood and affect; insight and judgment normal.


Laboratory





Laboratory Tests








Test


  2/23/18


04:54 2/23/18


05:28 2/23/18


11:10


 


Blood Gas Puncture Site RT RADIAL   


 


Blood Gas Patient Temperature 98.6   


 


Blood Gas HCO3 22 mmol/L   


 


Blood Gas Base Excess -2.2 mmol/L   


 


Blood Gas Oxygen Saturation 86 %   


 


Arterial Blood pH 7.40   


 


Arterial Blood Partial


Pressure CO2 36 mmHg 


  


  


 


 


Arterial Blood Partial


Pressure O2 60 mmHg 


  


  


 


 


Arterial Blood Oxygen Content 11.8 Vol %   


 


Arterial Blood


Carboxyhemoglobin 2.7 % 


  


  


 


 


Arterial Blood Methemoglobin 1.0 %   


 


Blood Gas Hemoglobin 9.8 G/DL   


 


Oxygen Delivery Device ROOM AIR   


 


Blood Gas Inspired Oxygen 21 %   


 


White Blood Count  16.2 TH/MM3  


 


Red Blood Count  4.24 MIL/MM3  


 


Hemoglobin  10.5 GM/DL  


 


Hematocrit  33.5 %  


 


Mean Corpuscular Volume  79.0 FL  


 


Mean Corpuscular Hemoglobin  24.6 PG  


 


Mean Corpuscular Hemoglobin


Concent 


  31.2 % 


  


 


 


Red Cell Distribution Width  21.1 %  


 


Platelet Count  267 TH/MM3  


 


Mean Platelet Volume  9.8 FL  


 


Neutrophils (%) (Auto)  90.7 %  


 


Lymphocytes (%) (Auto)  2.8 %  


 


Monocytes (%) (Auto)  6.0 %  


 


Eosinophils (%) (Auto)  0.1 %  


 


Basophils (%) (Auto)  0.4 %  


 


Neutrophils # (Auto)  14.7 TH/MM3  


 


Lymphocytes # (Auto)  0.5 TH/MM3  


 


Monocytes # (Auto)  1.0 TH/MM3  


 


Eosinophils # (Auto)  0.0 TH/MM3  


 


Basophils # (Auto)  0.1 TH/MM3  


 


CBC Comment  DIFF FINAL  


 


Differential Comment    


 


B-Type Natriuretic Peptide


  


  GREATER THAN


5000 PG/ML 


 


 


Blood Urea Nitrogen   26 MG/DL 


 


Creatinine   1.56 MG/DL 


 


Random Glucose   150 MG/DL 


 


Calcium Level   8.7 MG/DL 


 


Sodium Level   136 MEQ/L 


 


Potassium Level   4.1 MEQ/L 


 


Chloride Level   102 MEQ/L 


 


Carbon Dioxide Level   26.2 MEQ/L 


 


Anion Gap   8 MEQ/L 


 


Estimat Glomerular Filtration


Rate 


  


  43 ML/MIN 


 








Imaging





Last 24 hours Impressions








Chest X-Ray 2/23/18 0000 Signed





Impressions: 





 Service Date/Time:  Friday, February 23, 2018 05:12 - CONCLUSION: Mild 

failure.  





    Adi Fermin MD 











Assessment and Plan


Problem List:  


(1) Severe anemia


ICD Codes:  D64.9 - Anemia, unspecified


(2) Aortic stenosis


ICD Codes:  I35.0 - Nonrheumatic aortic (valve) stenosis


(3) JOSIE (acute kidney injury)


ICD Codes:  N17.9 - Acute kidney failure, unspecified


Assessment and Plan


1) Severe anemia


   Work up per primary team


   GI for scoping


      Overall high risk for procedure due to severe AS, but can not decrease 

his risk at this time


      Would make sure he's not in heart failure before procedure, will plan to 

diurese him some as he's received a 3 units PRBC


2) Aortic stenosis


   Work up on hold due to anemia


   Most likely reschedule outpt


3) CP/SOB/Dizziness


   Most likely due to anemia


4) JOSIE


   Secondary to anemia











Paul Bo DO Feb 23, 2018 17:41

## 2018-02-23 NOTE — PD.CONS
HPI


History of Present Illness


This is a 77 year old M with  medical history significant for DM, HTN, 

hyperlipidemia, gout, and aortic stenosis.  He was sent by cardiologist two 

days ago to be worked up for reported dizziness with exertion and chest 

tightness.  On admission pt was found to be severely anemia, H/H 5.6/18.9, now S

/P 3 U PRBCs 10.5/33.5.  Pt reports history of severe anemia, states last time 

this happened he took himself off of Plavix and ASA and his blood count 

improved.  Of note, he is also on iron supplements.  Pt reports acid reflux, 

does not take prescription or OTC medication for this. Denies nausea, vomiting, 

dysphagia, abdominal pain.  Reports his stools have always been black since 

taking iron supplements, however, has noticed they have been more loose over 

the past week.  Denies history of GIB, not currently on blood thinners. Denies 

ETOH, smoking.  


 (Arline Arteaga)





PFSH


Past Medical History


HTN


Hyperlipidemia


Aortic stenosis


Gout 


 (Arline Arteaga)


Coded Allergies:  


     No Known Allergies (Verified  Allergy, Unknown, 2/21/18)


Social History


Denies ETOH


Denies smoking


 (Arline Arteaga)





Review of Systems


Gastrointestinal:  COMPLAINS OF: Black stools, Diarrhea, Heartburn, DENIES: 

Abdominal pain, Bloody stools, Constipation, Nausea, Vomiting, Difficulty 

Swallowing, Odynophagia, Swelling of Abdomen, Hematemesis (Arline Arteaga)





GI Exam


Vitals I&O





Vital Signs








  Date Time  Temp Pulse Resp B/P (MAP) Pulse Ox O2 Delivery O2 Flow Rate FiO2


 


2/23/18 06:00      Nasal Cannula 3.00 


 


2/23/18 05:00     94 Nasal Cannula 3.00 


 


2/23/18 04:00  94      


 


2/23/18 04:00 98.3 97 26 159/91 (113) 94   


 


2/23/18 00:00 98.7 80 21 137/79 (98) 97   


 


2/22/18 23:49  84      


 


2/22/18 20:04  77      


 


2/22/18 20:00 98.8 82 20 134/75 (94) 96   


 


2/22/18 16:00  64      


 


2/22/18 16:00 98.1 71 18 121/68 (85) 97   


 


2/22/18 15:42 98.1 70 18 121/68 97   


 


2/22/18 14:15 97.0 81 22 113/75 98   


 


2/22/18 13:34 97.5 79 20  95   


 


2/22/18 13:21 97.8  18 124/64 97   


 


2/22/18 12:00  74      


 


2/22/18 11:00 98.4 70 16 123/63 (83) 97   














I/O      


 


 2/22/18 2/22/18 2/22/18 2/23/18 2/23/18 2/23/18





 07:00 15:00 23:00 07:00 15:00 23:00


 


Intake Total 450 ml 1110 ml 880 ml 200 ml  


 


Output Total 600 ml  600 ml 850 ml  


 


Balance -150 ml 1110 ml 280 ml -650 ml  


 


      


 


Intake Oral 450 ml  480 ml 200 ml  


 


IV Total  1110 ml    


 


Packed Cells   400 ml   


 


Output Urine Total 600 ml  600 ml 850 ml  


 


# Bowel Movements   1 2  








Imaging





Last Impressions








Chest X-Ray 2/23/18 0000 Signed





Impressions: 





 Service Date/Time:  Friday, February 23, 2018 05:12 - CONCLUSION: Mild 

failure.  





    Adi Fermin MD 








Laboratory











Test


  2/23/18


04:54 2/23/18


05:28


 


Blood Gas Puncture Site RT RADIAL  


 


Blood Gas Patient Temperature 98.6  


 


Blood Gas HCO3 22 mmol/L  


 


Blood Gas Base Excess -2.2 mmol/L  


 


Blood Gas Oxygen Saturation 86 %  


 


Arterial Blood pH 7.40  


 


Arterial Blood Partial


Pressure CO2 36 mmHg 


  


 


 


Arterial Blood Partial


Pressure O2 60 mmHg 


  


 


 


Arterial Blood Oxygen Content 11.8 Vol %  


 


Arterial Blood


Carboxyhemoglobin 2.7 % 


  


 


 


Arterial Blood Methemoglobin 1.0 %  


 


Blood Gas Hemoglobin 9.8 G/DL  


 


Oxygen Delivery Device ROOM AIR  


 


Blood Gas Inspired Oxygen 21 %  


 


White Blood Count  16.2 TH/MM3 


 


Red Blood Count  4.24 MIL/MM3 


 


Hemoglobin  10.5 GM/DL 


 


Hematocrit  33.5 % 


 


Mean Corpuscular Volume  79.0 FL 


 


Mean Corpuscular Hemoglobin  24.6 PG 


 


Mean Corpuscular Hemoglobin


Concent 


  31.2 % 


 


 


Red Cell Distribution Width  21.1 % 


 


Platelet Count  267 TH/MM3 


 


Mean Platelet Volume  9.8 FL 


 


Neutrophils (%) (Auto)  90.7 % 


 


Lymphocytes (%) (Auto)  2.8 % 


 


Monocytes (%) (Auto)  6.0 % 


 


Eosinophils (%) (Auto)  0.1 % 


 


Basophils (%) (Auto)  0.4 % 


 


Neutrophils # (Auto)  14.7 TH/MM3 


 


Lymphocytes # (Auto)  0.5 TH/MM3 


 


Monocytes # (Auto)  1.0 TH/MM3 


 


Eosinophils # (Auto)  0.0 TH/MM3 


 


Basophils # (Auto)  0.1 TH/MM3 


 


CBC Comment  DIFF FINAL 


 


Differential Comment   


 


B-Type Natriuretic Peptide


  


  GREATER THAN


5000 PG/ML














 Date/Time


Source Procedure


Growth Status


 


 


 2/22/18 17:27


Stool Stool Stool Occult Blood (PRANAV) - Final


HEMOCCULT POSITIVE Complete








Physical Examination


HEENT: Normocephalic; atraumatic


CHEST:  Shallow, tachypneic 


CARDIAC:  RRR (+) murmur


ABDOMEN:  Soft, round, nontender, bowel sounds active 


EXTREMITIES: Trace BLE edema 


SKIN:  Normal; no rash; no jaundice.


CNS:  No focal deficits; alert and oriented times three.


 (Arline Arteaga)





Assessment and Plan


Plan


Assessment:


- Anemia, microcytic- On admission H/H 5.6/18.9 S/P 3 U PRBCs, currently 10.5/

33.3.


  Pt reports history of severe anemia multiple years ago, no clear cause, 

states he took


  himself off Plavix and ASA.  Hemoccult (+). Reports black stools, normal for 

him due


  to taking iron supplements but has been more loose over the past week.  

Denies history


  of GIB.  Last colonoscopy approx 5 years ago in [place], he is unsure of 

findings.


  He is unsure if he has ever had EGD.  Denies ETOH, smoking, NSAIDs.  


- JOSIE- ? secondary to severe anemia 


- Dizziness with exertion, chest tightness- sent to ER by cardiology, seen by 

Dr. Bo


  Cardiac cath on hold until  for anemia.  BNP >5000 Chest x-ray consistent 

with


  mild failure.  Lasix.  Pt remains with tachypneic and shallow breathing





Plan:


EGD/colonoscopy tomorrow


Obtain consent


Clear liquids today


GoLytely prep


NPO after MN


Protonix


Monitor H/H


Notify GI of active bleeding


Further recommendations based on findings of above





Pt has been seen and examined by myself and Dr. Haq and this note is 

written on his behalf 


 (Arline Arteaga)


Physician Comments


Seen and examined with JS, egd/colonoscopy planned for tomorrow with Dr. espinoza. Monitor labs. Bowel prep tonite. Thank you


 (Chip Haq MD)











Arline Arteaga Feb 23, 2018 08:58


Chip Haq MD Feb 23, 2018 20:09

## 2018-02-23 NOTE — RADRPT
EXAM DATE/TIME:  02/23/2018 05:12 

 

[facility] COMPARISON:     

No previous studies available for comparison.

 

                     

INDICATIONS :     

Short of breath.

                     

 

MEDICAL HISTORY :     

None.          

 

SURGICAL HISTORY :     

None.   

 

ENCOUNTER:     

Initial                                        

 

ACUITY:     

1 day      

 

PAIN SCORE:     

0/10

 

LOCATION:     

Bilateral chest 

 

FINDINGS:     

Diffuse but basilar predominant hazy opacities are seen in both lungs. Small bilateral pleural effusi
ons are also suspected. There is mild cardiomegaly. No pneumothorax.

 

Thoracic aorta is tortuous.

 

CONCLUSION:     Mild failure.

 

 

 

 Adi Fermin MD on February 23, 2018 at 6:17           

Board Certified Radiologist.

 This report was verified electronically.

## 2018-02-23 NOTE — HHI.PR
Subjective


Remarks


Follow-up severe anemia


02/22/18-patient seen and examined; transfused 2 units PRBC, still complains of 

dizziness with some SOB. Denies any CP


02/23/18-patient seen and examined, he had Hemoccult-positive for which GI has 

been consulted.  Patient denies any dizziness this a.m.





Objective


Vitals





Vital Signs








  Date Time  Temp Pulse Resp B/P (MAP) Pulse Ox O2 Delivery O2 Flow Rate FiO2


 


2/23/18 08:00 98.2 90 28 137/71 (93) 99   


 


2/23/18 06:00      Nasal Cannula 3.00 


 


2/23/18 05:00     94 Nasal Cannula 3.00 


 


2/23/18 04:00  94      


 


2/23/18 04:00 98.3 97 26 159/91 (113) 94   


 


2/23/18 00:00 98.7 80 21 137/79 (98) 97   


 


2/22/18 23:49  84      


 


2/22/18 20:04  77      


 


2/22/18 20:00 98.8 82 20 134/75 (94) 96   


 


2/22/18 16:00  64      


 


2/22/18 16:00 98.1 71 18 121/68 (85) 97   


 


2/22/18 15:42 98.1 70 18 121/68 97   


 


2/22/18 14:15 97.0 81 22 113/75 98   


 


2/22/18 13:34 97.5 79 20  95   


 


2/22/18 13:21 97.8  18 124/64 97   


 


2/22/18 12:00  74      














I/O      


 


 2/22/18 2/22/18 2/22/18 2/23/18 2/23/18 2/23/18





 07:00 15:00 23:00 07:00 15:00 23:00


 


Intake Total 450 ml 1110 ml 880 ml 200 ml  


 


Output Total 600 ml  600 ml 850 ml  


 


Balance -150 ml 1110 ml 280 ml -650 ml  


 


      


 


Intake Oral 450 ml  480 ml 200 ml  


 


IV Total  1110 ml    


 


Packed Cells   400 ml   


 


Output Urine Total 600 ml  600 ml 850 ml  


 


# Bowel Movements   1 2  








Result Diagram:  


2/23/18 0528                                                                   

             2/22/18 0632





Objective Remarks


GENERAL: NAD


SKIN: Warm and dry.


HEAD: Normocephalic.


EYES: No scleral icterus. No injection or drainage. 


NECK: Supple, trachea midline. No JVD or lymphadenopathy.


CARDIOVASCULAR: Regular rate and rhythm with +2/6 murmur


RESPIRATORY: Breath sounds equal bilaterally. No accessory muscle use.


GASTROINTESTINAL: Abdomen soft, non-tender, nondistended. 


MUSCULOSKELETAL: No cyanosis, or edema. 


BACK: Nontender without obvious deformity. No CVA tenderness.








A/P


Problem List:  


(1) Severe anemia


ICD Code:  D64.9 - Anemia, unspecified


Assessment and Plan


77 year-old man with





Severe anemia


Occult GI bleeding


Transfused 3 units packed red blood cells


Hemoccult Positive


GI has been consulted for evaluation for possible panendoscopy


Continue serial H&H check


PPI








Aortic stenosis


Coronary artery disease


(patient is uncertain today if he has a history of CHF, prior MI, or any 

history of arrhythmia)


Cardiology following


No change to baseline treatments


Cardiac symptoms are likely related primarily to anemia in the setting of 

underlying coronary artery disease





Acute renal failure


Continue with Gentle IVF hydration





Diabetes mellitus type 2


Insulin sliding scale


Diabetic diet





Hypertension


Continue baseline treatment





Hyperlipidemia


Continue present treatment





Gout


No change in baseline treatment





DVT prophylaxis


SCDs


Avoid blood thinners for now due to degree of anemia











Arash Galvez MD Feb 23, 2018 11:14

## 2018-02-24 VITALS
HEART RATE: 75 BPM | SYSTOLIC BLOOD PRESSURE: 118 MMHG | OXYGEN SATURATION: 99 % | TEMPERATURE: 97.9 F | RESPIRATION RATE: 20 BRPM | DIASTOLIC BLOOD PRESSURE: 68 MMHG

## 2018-02-24 VITALS
SYSTOLIC BLOOD PRESSURE: 124 MMHG | OXYGEN SATURATION: 94 % | HEART RATE: 78 BPM | DIASTOLIC BLOOD PRESSURE: 67 MMHG | RESPIRATION RATE: 20 BRPM | TEMPERATURE: 98.3 F

## 2018-02-24 VITALS
DIASTOLIC BLOOD PRESSURE: 56 MMHG | TEMPERATURE: 98.1 F | SYSTOLIC BLOOD PRESSURE: 104 MMHG | RESPIRATION RATE: 20 BRPM | HEART RATE: 72 BPM | OXYGEN SATURATION: 100 %

## 2018-02-24 VITALS — HEART RATE: 70 BPM

## 2018-02-24 VITALS
RESPIRATION RATE: 20 BRPM | HEART RATE: 76 BPM | OXYGEN SATURATION: 100 % | TEMPERATURE: 97.4 F | DIASTOLIC BLOOD PRESSURE: 71 MMHG | SYSTOLIC BLOOD PRESSURE: 147 MMHG

## 2018-02-24 VITALS
RESPIRATION RATE: 20 BRPM | OXYGEN SATURATION: 100 % | TEMPERATURE: 97.5 F | DIASTOLIC BLOOD PRESSURE: 75 MMHG | HEART RATE: 92 BPM | SYSTOLIC BLOOD PRESSURE: 153 MMHG

## 2018-02-24 VITALS
DIASTOLIC BLOOD PRESSURE: 58 MMHG | RESPIRATION RATE: 20 BRPM | OXYGEN SATURATION: 93 % | SYSTOLIC BLOOD PRESSURE: 113 MMHG | HEART RATE: 69 BPM | TEMPERATURE: 97.6 F

## 2018-02-24 VITALS — OXYGEN SATURATION: 99 %

## 2018-02-24 PROCEDURE — 0DB38ZX EXCISION OF LOWER ESOPHAGUS, VIA NATURAL OR ARTIFICIAL OPENING ENDOSCOPIC, DIAGNOSTIC: ICD-10-PCS | Performed by: INTERNAL MEDICINE

## 2018-02-24 PROCEDURE — 0D5E8ZZ DESTRUCTION OF LARGE INTESTINE, VIA NATURAL OR ARTIFICIAL OPENING ENDOSCOPIC: ICD-10-PCS | Performed by: INTERNAL MEDICINE

## 2018-02-24 PROCEDURE — 0DBE8ZZ EXCISION OF LARGE INTESTINE, VIA NATURAL OR ARTIFICIAL OPENING ENDOSCOPIC: ICD-10-PCS | Performed by: INTERNAL MEDICINE

## 2018-02-24 PROCEDURE — 0DB78ZX EXCISION OF STOMACH, PYLORUS, VIA NATURAL OR ARTIFICIAL OPENING ENDOSCOPIC, DIAGNOSTIC: ICD-10-PCS | Performed by: INTERNAL MEDICINE

## 2018-02-24 RX ADMIN — PANTOPRAZOLE SODIUM SCH MG: 40 INJECTION, POWDER, FOR SOLUTION INTRAVENOUS at 10:57

## 2018-02-24 RX ADMIN — INSULIN ASPART SCH: 100 INJECTION, SOLUTION INTRAVENOUS; SUBCUTANEOUS at 08:00

## 2018-02-24 RX ADMIN — INSULIN ASPART SCH: 100 INJECTION, SOLUTION INTRAVENOUS; SUBCUTANEOUS at 20:10

## 2018-02-24 RX ADMIN — FERROUS SULFATE TAB 325 MG (65 MG ELEMENTAL FE) SCH MG: 325 (65 FE) TAB at 09:00

## 2018-02-24 RX ADMIN — PHENYTOIN SODIUM SCH MLS/HR: 50 INJECTION INTRAMUSCULAR; INTRAVENOUS at 09:00

## 2018-02-24 RX ADMIN — CARVEDILOL SCH MG: 6.25 TABLET, FILM COATED ORAL at 20:10

## 2018-02-24 RX ADMIN — INSULIN ASPART SCH: 100 INJECTION, SOLUTION INTRAVENOUS; SUBCUTANEOUS at 12:00

## 2018-02-24 RX ADMIN — PANTOPRAZOLE SODIUM SCH MG: 40 INJECTION, POWDER, FOR SOLUTION INTRAVENOUS at 20:11

## 2018-02-24 RX ADMIN — CARVEDILOL SCH MG: 6.25 TABLET, FILM COATED ORAL at 10:57

## 2018-02-24 RX ADMIN — COLCHICINE SCH MG: 0.6 TABLET, FILM COATED ORAL at 11:02

## 2018-02-24 RX ADMIN — ATORVASTATIN CALCIUM SCH MG: 40 TABLET, FILM COATED ORAL at 10:57

## 2018-02-24 RX ADMIN — INSULIN ASPART SCH: 100 INJECTION, SOLUTION INTRAVENOUS; SUBCUTANEOUS at 16:59

## 2018-02-24 NOTE — HHI.PR
Subjective


Remarks


Follow-up for severe anemia, aortic stenosis.  Patient is currently doing well.

  He denies any chest pain, shortness of breath, fever or chills.  He is going 

for EGD/colonoscopy today.





Objective


Vitals





Vital Signs








  Date Time  Temp Pulse Resp B/P (MAP) Pulse Ox O2 Delivery O2 Flow Rate FiO2


 


2/24/18 08:00 97.5 92 20 153/75 (101) 100   


 


2/24/18 04:00 98.1 72 20 104/56 (72) 100   


 


2/24/18 03:47  70      


 


2/24/18 00:00 98.3 78 20 124/67 (86) 94   


 


2/23/18 23:40  76      


 


2/23/18 20:00 98.0 76 24 130/70 (90) 100   


 


2/23/18 20:00  72      


 


2/23/18 16:00 97.2 72 18 123/77 (92) 100   


 


2/23/18 15:58  67      


 


2/23/18 12:14  73      


 


2/23/18 12:00 98.9 77 32 121/72 (88) 99   














I/O      


 


 2/23/18 2/23/18 2/23/18 2/24/18 2/24/18 2/24/18





 07:00 15:00 23:00 07:00 15:00 23:00


 


Intake Total 200 ml 630 ml  360 ml  


 


Output Total 850 ml 600 ml  0 ml  


 


Balance -650 ml 30 ml  360 ml  


 


      


 


Intake Oral 200 ml 630 ml  360 ml  


 


Output Urine Total 850 ml 600 ml    


 


Stool Total    0 ml  


 


# Voids    0  


 


# Bowel Movements 2     








Result Diagram:  


2/23/18 0528                                                                   

             2/23/18 1110





Imaging





Last Impressions








Chest X-Ray 2/23/18 0000 Signed





Impressions: 





 Service Date/Time:  Friday, February 23, 2018 05:12 - CONCLUSION: Mild 

failure.  





    dAi Fermin MD 








Objective Remarks


GENERAL: Alert, NAD.


SKIN: Warm and dry.


HEAD: Normocephalic.


EYES: No scleral icterus. No injection or drainage. 


NECK: Supple, trachea midline. No JVD or lymphadenopathy.  Systolic murmur 

appreciated on the right sternal border.


CARDIOVASCULAR: Regular rate and rhythm without gallops, or rubs. 


RESPIRATORY: Breath sounds equal bilaterally. No accessory muscle use.


GASTROINTESTINAL: Abdomen soft, non-tender, nondistended. 


MUSCULOSKELETAL: No cyanosis, or edema. 


BACK: Nontender without obvious deformity. No CVA tenderness.





Procedures


EGD, colonoscopy planned for today.





A/P


Problem List:  


(1) Severe anemia


ICD Code:  D64.9 - Anemia, unspecified


Status:  Acute


(2) Diabetes mellitus


ICD Code:  E11.9 - Type 2 diabetes mellitus without complications


(3) Aortic stenosis


ICD Code:  I35.0 - Nonrheumatic aortic (valve) stenosis


Status:  Chronic


(4) CAD (coronary artery disease)


ICD Code:  I25.10 - Atherosclerotic heart disease of native coronary artery 

without angina pectoris


Status:  Chronic


Assessment and Plan


Mr. Cho is a 77-year-old male with a history of diabetes mellitus, 

hypertension, hyperlipidemia, aortic stenosis who was sent by his cardiologist 

due to dizziness with exertion and chest tightness.  On admission workup 

indicated hemoglobin 5.6 and hematocrit 18.9.  Patient received 3 units of 

PRBCs.  Due to significant aortic stenosis, cardiology recommended left and 

right heart catheterization.  However during discussion prior to coming to the 

hospital, cardiologist was concerned regarding anemia.





- Severe symptomatic anemia


   - Patient has been on Plavix and aspirin.  His Hemoccult was positive.


   - Status post 3 units of PRBC transfusion.


   - GI is evaluating patient.  Likely EGD colonoscopy today.


   - Continue Protonix, continue ferrous sulfate 325 mg by mouth daily.





- Severe aortic stenosis


- Coronary artery disease


- Dyslipidemia


   - Cardiology is following.  Plans for left and right heart catheterization 

is on hold currently.


   - Currently on carvedilol 6.25 twice a day, atorvastatin 40 mg daily, 

furosemide 40 mg IV daily.





- Diabetes mellitus


   - Patient takes metformin at home.  We'll continue sliding scale insulin in 

the hospital.  Goal blood glucose 140 - 180. 





Full code.  SCDs.  No pharmacological anticoagulation due to GI bleed.





Problem Qualifiers





(1) Aortic stenosis:  


Qualified Codes:  I35.0 - Nonrheumatic aortic (valve) stenosis


(2) CAD (coronary artery disease):  


Qualified Codes:  I25.10 - Atherosclerotic heart disease of native coronary 

artery without angina pectoris








Umer Stover DO Feb 24, 2018 9:44 am

## 2018-02-24 NOTE — HHI.GIFU
Subjective


Remarks


Resting in the bed preparing to go for EGD/colonoscopy


Denies any acute pain or distress


Shortness of breath noted


 (Maryam Franz)





Objective


Vitals I&O





Vital Signs








  Date Time  Temp Pulse Resp B/P (MAP) Pulse Ox O2 Delivery O2 Flow Rate FiO2


 


2/24/18 08:00 97.5 92 20 153/75 (101) 100   


 


2/24/18 04:00 98.1 72 20 104/56 (72) 100   


 


2/24/18 03:47  70      


 


2/24/18 00:00 98.3 78 20 124/67 (86) 94   


 


2/23/18 23:40  76      


 


2/23/18 20:00 98.0 76 24 130/70 (90) 100   


 


2/23/18 20:00  72      


 


2/23/18 16:00 97.2 72 18 123/77 (92) 100   


 


2/23/18 15:58  67      


 


2/23/18 12:14  73      


 


2/23/18 12:00 98.9 77 32 121/72 (88) 99   














I/O      


 


 2/23/18 2/23/18 2/23/18 2/24/18 2/24/18 2/24/18





 07:00 15:00 23:00 07:00 15:00 23:00


 


Intake Total 200 ml 630 ml  360 ml  


 


Output Total 850 ml 600 ml  0 ml  


 


Balance -650 ml 30 ml  360 ml  


 


      


 


Intake Oral 200 ml 630 ml  360 ml  


 


Output Urine Total 850 ml 600 ml    


 


Stool Total    0 ml  


 


# Voids    0  


 


# Bowel Movements 2     








Laboratory





Laboratory Tests








Test


  2/23/18


11:10


 


Blood Urea Nitrogen 26 


 


Creatinine 1.56 


 


Random Glucose 150 


 


Calcium Level 8.7 


 


Sodium Level 136 


 


Potassium Level 4.1 


 


Chloride Level 102 


 


Carbon Dioxide Level 26.2 


 


Anion Gap 8 


 


Estimat Glomerular Filtration


Rate 43 


 














 Date/Time


Source Procedure


Growth Status


 


 


 2/22/18 17:27


Stool Stool Stool Occult Blood (PRANAV) - Final


HEMOCCULT POSITIVE Complete








Imaging





Last Impressions








Chest X-Ray 2/23/18 0000 Signed





Impressions: 





 Service Date/Time:  Friday, February 23, 2018 05:12 - CONCLUSION: Mild 

failure.  





    Adi Fermin MD 








Physical Exam


HEENT: Pupils round and reactive to light; normocephalic; atraumatic; 


NECK: Neck is supple, no JVD, no lymphadenopathy.


CHEST:  Diminished auscultation and percussion.


CARDIAC:  Regular rate and rhythm with no murmur gallop or rubs.


ABDOMEN:  Soft, nondistended, nontender; no hepatosplenomegaly; bowel sounds 

are present in all four quadrants.


EXTREMITIES: No clubbing, cyanosis, or edema.


SKIN:  Turgor thin; no rash; no jaundice.,  Obese


CNS:  No focal deficits; alert and oriented times 2


 (Maryam Franz)





Assessment and Plan


Plan


Assessment:


- Anemia, microcytic- On admission H/H 5.6/18.9 S/P 3 U PRBCs,


  Pt reports history of severe anemia multiple years ago, no clear cause, 

states he took


  himself off Plavix and ASA.  Hemoccult (+). Reports black stools, normal for 

him due


  to taking iron supplements but has been more loose over the past week.  

Denies history


  of GIB.  Last colonoscopy approx 5 years ago in [place], he is unsure of 

findings.


  He is unsure if he has ever had EGD.  Denies ETOH, smoking, NSAIDs.  


- 


- Dizziness with exertion, chest tightness- sent to ER by cardiology, seen by 

Dr. Bo


  Cardiac cath on hold until  for anemia. 


Chest x-ray consistent with mild failure. 


Current hemoglobin today 10.5





Plan:


EGD/colonoscopy today


consent


NPO 


Protonix


Monitor H/H


Notify GI of active bleeding


Further recommendations based on findings of above





Pt has been seen and examined by myself and Dr. Colvin, note is written on 

his behalf 


 (Maryam Franz)


Plan


Patient was seen and examined, agree with above note, plan: EGD today, further 

plan to follow the procedures


 (Meliza Colvin MD)











Maryam Franz Feb 24, 2018 10:27


Meliza Colvin MD Feb 24, 2018 11:16

## 2018-02-24 NOTE — PD.CARD.PN
Subjective


Subjective Remarks


Mild to moderate dyspnea at rest.  No CP, PND, dizziness, palpitations.  Slept 

poorly.





Objective


Medications











Item Value  Date Time


 


Furosemide 20 mg 2/24/18 0900





 (Lasix Inj) DAILY/IV PUSH 2/24/18 1057


 


Atorvastatin 40 mg 2/23/18 0900





Calcium DAILY/PO 2/24/18 1057





 (Lipitor)  


 


Carvedilol 6.25 mg 2/21/18 2100





 (Coreg) BID/PO 2/24/18 1057











Current Medications








 Medications


  (Trade)  Dose


 Ordered  Sig/Ramu


 Route  Start Time


 Stop Time Status Last Admin


 


 Sodium Chloride  1,000 ml @ 


 30 mls/hr  Q24H


 IV  2/21/18 09:00


    2/22/18 08:30


 


 


  (Zofran Inj)  4 mg  Q6H  PRN


 IVP  2/21/18 10:15


     


 


 


  (Narcan Inj)  0.4 mg  UNSCH  PRN


 IV PUSH  2/21/18 10:15


     


 


 


  (Coreg)  6.25 mg  BID


 PO  2/21/18 21:00


    2/24/18 10:57


 


 


  (Ferrous Sulfate)  325 mg  DAILY


 PO  2/22/18 09:00


    2/23/18 08:11


 


 


  (D50w (Vial) Inj)  50 ml  UNSCH  PRN


 IV PUSH  2/21/18 18:00


     


 


 


  (Glucagon Inj)  1 mg  UNSCH  PRN


 OTHER  2/21/18 18:00


     


 


 


  (NovoLOG


 SUPPLEMENTAL


 SCALE)  1  ACHS SLIDING  SCALE


 SQ  2/21/18 21:00


     


 


 


  (Tylenol)  650 mg  Q4H  PRN


 PO  2/22/18 12:00


     


 


 


  (Benadryl)  25 mg  Q4H  PRN


 PO  2/22/18 12:00


     


 


 


  (Tylenol)  650 mg  Q4H  PRN


 PO  2/22/18 12:00


     


 


 


  (Restoril)  15 mg  HS  PRN


 PO  2/22/18 12:00


     


 


 


  (Colchicine)  0.6 mg  DAILY


 PO  2/23/18 09:00


    2/24/18 11:02


 


 


  (Glucophage)  500 mg  DAILY


 PO  2/23/18 09:00


   Future Hold 2/23/18 08:11


 


 


  (Lipitor)  40 mg  DAILY


 PO  2/23/18 09:00


    2/24/18 10:57


 


 


  (Duoneb Neb)  1 ampule  Q4HR NEB  PRN


 NEB  2/23/18 04:45


     


 


 


  (Protonix Inj)  40 mg  Q12H


 IV PUSH  2/23/18 09:00


    2/24/18 10:57


 


 


  (Lasix Inj)  20 mg  DAILY


 IV PUSH  2/24/18 09:00


    2/24/18 10:57


 








Vital Signs / I&O





Vital Signs








  Date Time  Temp Pulse Resp B/P (MAP) Pulse Ox O2 Delivery O2 Flow Rate FiO2


 


2/24/18 08:15      Nasal Cannula 3.00 


 


2/24/18 08:00  88      


 


2/24/18 08:00 97.5 92 20 153/75 (101) 100   


 


2/24/18 04:00 98.1 72 20 104/56 (72) 100   


 


2/24/18 03:47  70      


 


2/24/18 00:00 98.3 78 20 124/67 (86) 94   


 


2/23/18 23:40  76      


 


2/23/18 20:00 98.0 76 24 130/70 (90) 100   


 


2/23/18 20:00  72      


 


2/23/18 16:00 97.2 72 18 123/77 (92) 100   


 


2/23/18 15:58  67      


 


2/23/18 12:14  73      


 


2/23/18 12:00 98.9 77 32 121/72 (88) 99   














I/O      


 


 2/23/18 2/23/18 2/23/18 2/24/18 2/24/18 2/24/18





 07:00 15:00 23:00 07:00 15:00 23:00


 


Intake Total 200 ml 630 ml  360 ml  


 


Output Total 850 ml 600 ml  0 ml  


 


Balance -650 ml 30 ml  360 ml  


 


      


 


Intake Oral 200 ml 630 ml  360 ml  


 


Output Urine Total 850 ml 600 ml    


 


Stool Total    0 ml  


 


# Voids    0  


 


# Bowel Movements 2     








Physical Exam


GENERAL: Well developed, well nourished. No acute distress.


HEENT: Jugular venous pressure is normal. 


CHEST: Lungs clear to auscultation bilaterally. Unlabored respiratory effort.


CARDIAC: Regular rate and rhythm without S3, S4.  II/VI AS murmur.  Diminished 

S2.


ABDOMEN: Soft, nontender, no hepatosplenomegaly. Bowel sounds present.


EXTREMITIES: No clubbing, cyanosis.  Trace pretibial edema.





Assessment and Plan


Problem List:  


(1) Aortic stenosis


ICD Codes:  I35.0 - Nonrheumatic aortic (valve) stenosis


Status:  Chronic


Plan:  Stable overnight.  Awaiting anemia w/u before proceeding with cath.  

Mild CHF probably persists.  





REC await GI w/u


         increase IV furosemide


         continue beta blocker





(2) Severe anemia


ICD Codes:  D64.9 - Anemia, unspecified


Status:  Acute


Plan:  Hgb up to 10.5.  Awaiting GI work up.





(3) CAD (coronary artery disease)


ICD Codes:  I25.10 - Atherosclerotic heart disease of native coronary artery 

without angina pectoris


Status:  Chronic


Plan:  Stable.  No recent angina.  For cath as part of AS evaluation once 

anemia issues resolved.





Code Status


full code


Discussed Condition With


patient





Problem Qualifiers





(1) Aortic stenosis:  


Qualified Codes:  I35.0 - Nonrheumatic aortic (valve) stenosis


(2) CAD (coronary artery disease):  


Qualified Codes:  I25.10 - Atherosclerotic heart disease of native coronary 

artery without angina pectoris








Wily Walton MD Feb 24, 2018 11:32

## 2018-02-24 NOTE — PD.PROCEDR
GI Procedure


PROCEDURE PERFORMED


Upper endoscopy with biopsy


Colonoscopy with snare polypectomy ablation of colon polyps,





INDICATION FOR PROCEDURE


Anemia





PROCEDURE:


The procedure, risks and benefits were discussed with Mr. Cho and informed


consent was obtained.  Anesthesia sedated him with Diprivan.  He was placed in 

the left lateral decubitus position.





EGD:


The Pentax videoscope was introduced through the oropharynx and advanced to the 

second portion of the duodenum under direct visualization. Retroflexion was 

performed in the stomach.  Biopsy from the antrum, and from the distal esophagus








Colonoscopy:


The Pentax videoscope was introduced through the rectum and advanced to cecum 

which was identified by the ileocecal valve and appendiceal orifice. 

Retroflexion was performed in the rectum. Colonic prep was good, more than 15 

polyps either ablated with heat or removed by snare if it's more than 6 mm








ESTIMATED BLOOD LOSS:


None





SPECIMENS REMOVED:


Distal esophagus


Antrum


Colon polyps





COMPLICATIONS:


None





IMPRESSION:


Irregular Z line and questionable short Lucio biopsy was done from the EG 

junction


Mild gastritis biopsy from the antrum


Multiple polyps ranging in size between 5 mm and one centimeter, small polyps 

were ablated with heat and large polyp removed by snare





No clear reason for the anemia on this procedures


 PLAN:





Follow up biopsy


Diet as tolerated


Patient will need capsule endoscopy as an outpatient


Repeat colonoscopy in 5-6 month











Meliza Colvin MD Feb 24, 2018 13:07

## 2018-02-25 VITALS
DIASTOLIC BLOOD PRESSURE: 75 MMHG | TEMPERATURE: 97.3 F | RESPIRATION RATE: 18 BRPM | HEART RATE: 65 BPM | SYSTOLIC BLOOD PRESSURE: 117 MMHG | OXYGEN SATURATION: 100 %

## 2018-02-25 VITALS
TEMPERATURE: 98.6 F | OXYGEN SATURATION: 97 % | RESPIRATION RATE: 18 BRPM | DIASTOLIC BLOOD PRESSURE: 73 MMHG | SYSTOLIC BLOOD PRESSURE: 121 MMHG | HEART RATE: 72 BPM

## 2018-02-25 VITALS
TEMPERATURE: 97.7 F | OXYGEN SATURATION: 96 % | HEART RATE: 87 BPM | DIASTOLIC BLOOD PRESSURE: 65 MMHG | SYSTOLIC BLOOD PRESSURE: 155 MMHG | RESPIRATION RATE: 20 BRPM

## 2018-02-25 VITALS
RESPIRATION RATE: 17 BRPM | TEMPERATURE: 97.5 F | HEART RATE: 81 BPM | OXYGEN SATURATION: 99 % | SYSTOLIC BLOOD PRESSURE: 136 MMHG | DIASTOLIC BLOOD PRESSURE: 58 MMHG

## 2018-02-25 VITALS
OXYGEN SATURATION: 97 % | HEART RATE: 82 BPM | TEMPERATURE: 97.8 F | SYSTOLIC BLOOD PRESSURE: 126 MMHG | RESPIRATION RATE: 20 BRPM | DIASTOLIC BLOOD PRESSURE: 64 MMHG

## 2018-02-25 VITALS
SYSTOLIC BLOOD PRESSURE: 134 MMHG | HEART RATE: 75 BPM | DIASTOLIC BLOOD PRESSURE: 82 MMHG | TEMPERATURE: 97.3 F | OXYGEN SATURATION: 100 % | RESPIRATION RATE: 18 BRPM

## 2018-02-25 VITALS — HEART RATE: 84 BPM

## 2018-02-25 VITALS — HEART RATE: 88 BPM

## 2018-02-25 VITALS — OXYGEN SATURATION: 99 %

## 2018-02-25 LAB
BUN SERPL-MCNC: 26 MG/DL (ref 7–18)
CALCIUM SERPL-MCNC: 8.5 MG/DL (ref 8.5–10.1)
CHLORIDE SERPL-SCNC: 103 MEQ/L (ref 98–107)
CREAT SERPL-MCNC: 1.39 MG/DL (ref 0.6–1.3)
GFR SERPLBLD BASED ON 1.73 SQ M-ARVRAT: 49 ML/MIN (ref 89–?)
GLUCOSE SERPL-MCNC: 98 MG/DL (ref 74–106)
HCO3 BLD-SCNC: 31.4 MEQ/L (ref 21–32)
HCT VFR BLD CALC: 27.8 % (ref 39–51)
HGB BLD-MCNC: 8.8 GM/DL (ref 13–17)
SODIUM SERPL-SCNC: 140 MEQ/L (ref 136–145)

## 2018-02-25 RX ADMIN — INSULIN ASPART SCH: 100 INJECTION, SOLUTION INTRAVENOUS; SUBCUTANEOUS at 07:58

## 2018-02-25 RX ADMIN — ATORVASTATIN CALCIUM SCH MG: 40 TABLET, FILM COATED ORAL at 09:16

## 2018-02-25 RX ADMIN — FUROSEMIDE SCH MG: 10 INJECTION, SOLUTION INTRAMUSCULAR; INTRAVENOUS at 09:14

## 2018-02-25 RX ADMIN — INSULIN ASPART SCH: 100 INJECTION, SOLUTION INTRAVENOUS; SUBCUTANEOUS at 16:46

## 2018-02-25 RX ADMIN — PANTOPRAZOLE SCH MG: 40 TABLET, DELAYED RELEASE ORAL at 21:27

## 2018-02-25 RX ADMIN — PANTOPRAZOLE SODIUM SCH MG: 40 INJECTION, POWDER, FOR SOLUTION INTRAVENOUS at 09:14

## 2018-02-25 RX ADMIN — CARVEDILOL SCH MG: 6.25 TABLET, FILM COATED ORAL at 09:14

## 2018-02-25 RX ADMIN — FERROUS SULFATE TAB 325 MG (65 MG ELEMENTAL FE) SCH MG: 325 (65 FE) TAB at 09:18

## 2018-02-25 RX ADMIN — INSULIN ASPART SCH: 100 INJECTION, SOLUTION INTRAVENOUS; SUBCUTANEOUS at 21:00

## 2018-02-25 RX ADMIN — CARVEDILOL SCH MG: 6.25 TABLET, FILM COATED ORAL at 21:27

## 2018-02-25 RX ADMIN — COLCHICINE SCH MG: 0.6 TABLET, FILM COATED ORAL at 09:00

## 2018-02-25 RX ADMIN — INSULIN ASPART SCH: 100 INJECTION, SOLUTION INTRAVENOUS; SUBCUTANEOUS at 12:00

## 2018-02-25 NOTE — HHI.GIFU
Subjective


Remarks


Up in bathroom and ambulating in room


Denies any GI distress of nausea vomiting diarrhea or constipation


States normal BM yesterday


Appetite improved and eating well


 (Maryam Franz)





Objective


Vitals I&O





Vital Signs








  Date Time  Temp Pulse Resp B/P (MAP) Pulse Ox O2 Delivery O2 Flow Rate FiO2


 


2/25/18 10:38     99 Nasal Cannula 4.00 


 


2/25/18 08:00 97.7 87 20 155/65 (95) 96   


 


2/25/18 04:00 97.3 68 18 117/75 (89) 100   


 


2/25/18 04:00      Nasal Cannula 3.00 


 


2/25/18 04:00  65      


 


2/25/18 00:00      Nasal Cannula 3.00 


 


2/25/18 00:00 97.3 78 18 134/82 (99) 100   


 


2/25/18 00:00  75      


 


2/24/18 21:05     99 Nasal Cannula 3.00 


 


2/24/18 20:00      Nasal Cannula 3.00 


 


2/24/18 20:00 97.9 75 20 118/68 (85) 99   


 


2/24/18 16:00 97.6 85 20 113/58 (76) 93   


 


2/24/18 16:00  69      


 


2/24/18 13:45 97.8 73 20 138/65 (89) 100 Nasal Cannula 2 


 


2/24/18 13:30  74 20 110/55 (73) 100 Nasal Cannula 2 


 


2/24/18 13:13 97.8 69 20 114/58 (76) 93 Nasal Cannula 2 


 


2/24/18 12:00 97.4 76 20 147/71 (96) 100   














I/O      


 


 2/24/18 2/24/18 2/24/18 2/25/18 2/25/18 2/25/18





 07:00 15:00 23:00 07:00 15:00 23:00


 


Intake Total 360 ml 150 ml 0 ml 720 ml  


 


Output Total 0 ml   100 ml  


 


Balance 360 ml 150 ml 0 ml 620 ml  


 


      


 


Intake Oral 360 ml  0 ml 720 ml  


 


Other  150 ml    


 


Output Urine Total    100 ml  


 


Stool Total 0 ml     


 


# Voids 0  3   


 


# Bowel Movements   1 0  








Laboratory





Laboratory Tests








Test


  2/25/18


06:20


 


Blood Urea Nitrogen 26 


 


Creatinine 1.39 


 


Random Glucose 98 


 


Calcium Level 8.5 


 


Sodium Level 140 


 


Potassium Level 3.7 


 


Chloride Level 103 


 


Carbon Dioxide Level 31.4 


 


Anion Gap 6 


 


Estimat Glomerular Filtration


Rate 49 


 














 Date/Time


Source Procedure


Growth Status


 


 


 2/22/18 17:27


Stool Stool Stool Occult Blood (PRANAV) - Final


HEMOCCULT POSITIVE Complete








Imaging





Last Impressions








Chest X-Ray 2/23/18 0000 Signed





Impressions: 





 Service Date/Time:  Friday, February 23, 2018 05:12 - CONCLUSION: Mild 

failure.  





    Adi Fermin MD 








Physical Exam


HEENT: Pupils round and reactive to light; normocephalic; atraumatic; obese 

generalized


NECK: Neck is supple, no JVD, no lymphadenopathy.


CHEST:  Diminished auscultation and percussion.


CARDIAC:  Regular rate and rhythm 


ABDOMEN:  Obese, round, Soft, nondistended, nontender; no hepatosplenomegaly; 

bowel sounds are present in all four quadrants.


EXTREMITIES: No clubbing, cyanosis, or edema.


SKIN:  Turgor thin; no rash; no jaundice., 


CNS:  No focal deficits; alert and oriented times 3 and answers questions 

appropriately


 (Maryam Franz)





Assessment and Plan


Plan


Anemia unspecified, symptoms have resolved and patient is increasing his 

activity up in room ambulating.  Patient initially had hemoglobin of 5.6, 

hemoglobin pending today, transfused with 3 units now stable





EGD colonoscopy 02/24/18 , findings include questionable short Lucio biopsy 

at the EG junction


Mild gastritis biopsy from the antrum


Multiple polyps ranging in size between 5 mm and one centimeter, small polyps 

were ablated with heat and large polyp removed by snare








 PLAN:


Recheck hemoglobin today


Follow up biopsy


Diet as tolerated


Patient will need capsule endoscopy as an outpatient


Repeat colonoscopy in 5-6 month


Follow-up with GI as outpatient in the office, spoke with Dr. Stover concerning 

discharge needs.  Possible home today per Dr. Stover if hemoglobin okay





This patient was seen per myself and Dr. Colvin, is written on his behalf


 (Maryam Franz)


Plan


Patient is doing okay, he had multiple polyps on colonoscopy yesterday next he 

will need capsule endoscopy next monitor hemoglobin


 (Meliza Colvin MD)











Maryam Franz Feb 25, 2018 10:58


Meliza Colvin MD Feb 25, 2018 16:57

## 2018-02-25 NOTE — PD.CARD.PN
Subjective


Subjective Remarks


Dyspnea much better.   No CP, PND, dizziness, palpitations.  Slept fairly good.





Objective


Medications











Item Value  Date Time


 


Furosemide 40 mg 2/25/18 0900





 (Lasix Inj) DAILY/IV PUSH 2/25/18 0914


 


Atorvastatin 40 mg 2/23/18 0900





Calcium DAILY/PO 2/25/18 0916





 (Lipitor)  


 


Carvedilol 6.25 mg 2/21/18 2100





 (Coreg) BID/PO 2/25/18 0914











Current Medications








 Medications


  (Trade)  Dose


 Ordered  Sig/Ramu


 Route  Start Time


 Stop Time Status Last Admin


 


 Sodium Chloride  1,000 ml @ 


 30 mls/hr  Q24H


 IV  2/21/18 09:00


    2/22/18 08:30


 


 


  (Zofran Inj)  4 mg  Q6H  PRN


 IVP  2/21/18 10:15


     


 


 


  (Narcan Inj)  0.4 mg  UNSCH  PRN


 IV PUSH  2/21/18 10:15


     


 


 


  (Coreg)  6.25 mg  BID


 PO  2/21/18 21:00


    2/25/18 09:14


 


 


  (Ferrous Sulfate)  325 mg  DAILY


 PO  2/22/18 09:00


    2/25/18 09:18


 


 


  (D50w (Vial) Inj)  50 ml  UNSCH  PRN


 IV PUSH  2/21/18 18:00


     


 


 


  (Glucagon Inj)  1 mg  UNSCH  PRN


 OTHER  2/21/18 18:00


     


 


 


  (NovoLOG


 SUPPLEMENTAL


 SCALE)  1  ACHS SLIDING  SCALE


 SQ  2/21/18 21:00


    2/24/18 20:10


 


 


  (Tylenol)  650 mg  Q4H  PRN


 PO  2/22/18 12:00


     


 


 


  (Benadryl)  25 mg  Q4H  PRN


 PO  2/22/18 12:00


     


 


 


  (Tylenol)  650 mg  Q4H  PRN


 PO  2/22/18 12:00


     


 


 


  (Restoril)  15 mg  HS  PRN


 PO  2/22/18 12:00


     


 


 


  (Colchicine)  0.6 mg  DAILY


 PO  2/23/18 09:00


    2/24/18 11:02


 


 


  (Glucophage)  500 mg  DAILY


 PO  2/23/18 09:00


   Future Hold 2/23/18 08:11


 


 


  (Lipitor)  40 mg  DAILY


 PO  2/23/18 09:00


    2/25/18 09:16


 


 


  (Duoneb Neb)  1 ampule  Q4HR NEB  PRN


 NEB  2/23/18 04:45


     


 


 


  (Protonix Inj)  40 mg  Q12H


 IV PUSH  2/23/18 09:00


    2/25/18 09:14


 


 


  (Lasix Inj)  40 mg  DAILY


 IV PUSH  2/25/18 09:00


    2/25/18 09:14


 


 


 Miscellaneous


 Information  ALL


 NURSING


 DEPARTME...  UNSCH  PRN


 .XX  2/24/18 13:05


 2/25/18 13:04   


 








Vital Signs / I&O





Vital Signs








  Date Time  Temp Pulse Resp B/P (MAP) Pulse Ox O2 Delivery O2 Flow Rate FiO2


 


2/25/18 10:38     99 Nasal Cannula 4.00 


 


2/25/18 08:11  88      


 


2/25/18 08:00 97.7 87 20 155/65 (95) 96   


 


2/25/18 04:00 97.3 68 18 117/75 (89) 100   


 


2/25/18 04:00      Nasal Cannula 3.00 


 


2/25/18 04:00  65      


 


2/25/18 00:00      Nasal Cannula 3.00 


 


2/25/18 00:00 97.3 78 18 134/82 (99) 100   


 


2/25/18 00:00  75      


 


2/24/18 21:05     99 Nasal Cannula 3.00 


 


2/24/18 20:00      Nasal Cannula 3.00 


 


2/24/18 20:00 97.9 75 20 118/68 (85) 99   


 


2/24/18 16:00 97.6 85 20 113/58 (76) 93   


 


2/24/18 16:00  69      


 


2/24/18 13:45 97.8 73 20 138/65 (89) 100 Nasal Cannula 2 


 


2/24/18 13:30  74 20 110/55 (73) 100 Nasal Cannula 2 


 


2/24/18 13:13 97.8 69 20 114/58 (76) 93 Nasal Cannula 2 


 


2/24/18 12:00 97.4 76 20 147/71 (96) 100   














I/O      


 


 2/24/18 2/24/18 2/24/18 2/25/18 2/25/18 2/25/18





 07:00 15:00 23:00 07:00 15:00 23:00


 


Intake Total 360 ml 150 ml 0 ml 720 ml  


 


Output Total 0 ml   100 ml  


 


Balance 360 ml 150 ml 0 ml 620 ml  


 


      


 


Intake Oral 360 ml  0 ml 720 ml  


 


Other  150 ml    


 


Output Urine Total    100 ml  


 


Stool Total 0 ml     


 


# Voids 0  3   


 


# Bowel Movements   1 0  








Physical Exam


GENERAL: Well developed, well nourished. No acute distress.


HEENT: Jugular venous pressure is normal. 


CHEST: Lungs clear to auscultation bilaterally. Unlabored respiratory effort.


CARDIAC: Regular rate and rhythm without S3, S4.  II/VI AS murmur.  Diminished 

S2.


ABDOMEN: Soft, nontender, no hepatosplenomegaly. Bowel sounds present.


EXTREMITIES: No clubbing, cyanosis.  Trace pretibial edema.


Laboratory





Laboratory Tests








Test


  2/25/18


06:20 2/25/18


11:15


 


Blood Urea Nitrogen 26 MG/DL  


 


Creatinine 1.39 MG/DL  


 


Random Glucose 98 MG/DL  


 


Calcium Level 8.5 MG/DL  


 


Sodium Level 140 MEQ/L  


 


Potassium Level 3.7 MEQ/L  


 


Chloride Level 103 MEQ/L  


 


Carbon Dioxide Level 31.4 MEQ/L  


 


Anion Gap 6 MEQ/L  


 


Estimat Glomerular Filtration


Rate 49 ML/MIN 


  


 


 


Hemoglobin  8.8 GM/DL 


 


Hematocrit  27.8 % 











Assessment and Plan


Problem List:  


(1) Aortic stenosis


ICD Codes:  I35.0 - Nonrheumatic aortic (valve) stenosis


Status:  Chronic


Plan:  Stable overnight.  Awaiting anemia w/u before proceeding with cath.   

Dyspnea much better today.





REC OK to discharge home today, further w/u as outpatient





(2) Severe anemia


ICD Codes:  D64.9 - Anemia, unspecified


Status:  Acute


Plan:  Hgb back down to 8.8.    Probably needs capsule endoscopy as outpatient.





(3) CAD (coronary artery disease)


ICD Codes:  I25.10 - Atherosclerotic heart disease of native coronary artery 

without angina pectoris


Status:  Chronic


Plan:  Stable.  No recent angina.  For cath as part of AS evaluation once 

anemia issues resolved.





Code Status


full code


Discussed Condition With


patient





Problem Qualifiers





(1) Aortic stenosis:  


Qualified Codes:  I35.0 - Nonrheumatic aortic (valve) stenosis


(2) CAD (coronary artery disease):  


Qualified Codes:  I25.10 - Atherosclerotic heart disease of native coronary 

artery without angina pectoris








Wily Walton MD Feb 25, 2018 11:50

## 2018-02-25 NOTE — HHI.PR
Subjective


Remarks


Follow-up for severe anemia, aortic stenosis.  Patient is currently doing well.

  Denies any acute concerns.  No fever chills.  No shortness of breath.





Objective


Vitals





Vital Signs








  Date Time  Temp Pulse Resp B/P (MAP) Pulse Ox O2 Delivery O2 Flow Rate FiO2


 


2/25/18 10:38     99 Nasal Cannula 4.00 


 


2/25/18 08:11  88      


 


2/25/18 08:00 97.7 87 20 155/65 (95) 96   


 


2/25/18 04:00 97.3 68 18 117/75 (89) 100   


 


2/25/18 04:00      Nasal Cannula 3.00 


 


2/25/18 04:00  65      


 


2/25/18 00:00      Nasal Cannula 3.00 


 


2/25/18 00:00 97.3 78 18 134/82 (99) 100   


 


2/25/18 00:00  75      


 


2/24/18 21:05     99 Nasal Cannula 3.00 


 


2/24/18 20:00      Nasal Cannula 3.00 


 


2/24/18 20:00 97.9 75 20 118/68 (85) 99   


 


2/24/18 16:00 97.6 85 20 113/58 (76) 93   


 


2/24/18 16:00  69      


 


2/24/18 13:45 97.8 73 20 138/65 (89) 100 Nasal Cannula 2 


 


2/24/18 13:30  74 20 110/55 (73) 100 Nasal Cannula 2 


 


2/24/18 13:13 97.8 69 20 114/58 (76) 93 Nasal Cannula 2 














I/O      


 


 2/24/18 2/24/18 2/24/18 2/25/18 2/25/18 2/25/18





 07:00 15:00 23:00 07:00 15:00 23:00


 


Intake Total 360 ml 150 ml 0 ml 720 ml  


 


Output Total 0 ml   100 ml  


 


Balance 360 ml 150 ml 0 ml 620 ml  


 


      


 


Intake Oral 360 ml  0 ml 720 ml  


 


Other  150 ml    


 


Output Urine Total    100 ml  


 


Stool Total 0 ml     


 


# Voids 0  3   


 


# Bowel Movements   1 0  








Result Diagram:  


2/25/18 1115                                                                   

             2/25/18 0620





Imaging





Last Impressions








Chest X-Ray 2/23/18 0000 Signed





Impressions: 





 Service Date/Time:  Friday, February 23, 2018 05:12 - CONCLUSION: Mild 

failure.  





    Adi Fermin MD 








Objective Remarks


GENERAL: Alert, NAD.


SKIN: Warm and dry.


HEAD: Normocephalic.


EYES: No scleral icterus. No injection or drainage. 


NECK: Supple, trachea midline. No JVD or lymphadenopathy.  Systolic murmur 

appreciated on the right sternal border.


CARDIOVASCULAR: Regular rate and rhythm without gallops, or rubs. 


RESPIRATORY: Breath sounds equal bilaterally. No accessory muscle use.


GASTROINTESTINAL: Abdomen soft, non-tender, nondistended. 


MUSCULOSKELETAL: No cyanosis, or edema. 


BACK: Nontender without obvious deformity. No CVA tenderness.





Procedures


EGD, colonoscopy 2/24/2018.





Irregular Z line and questionable short Lucio biopsy was done from the EG 

junction


Mild gastritis biopsy from the antrum


Multiple polyps ranging in size between 5 mm and one centimeter, small polyps 

were ablated with heat and large polyp removed by snare





A/P


Problem List:  


(1) Severe anemia


ICD Code:  D64.9 - Anemia, unspecified


Status:  Acute


(2) Diabetes mellitus


ICD Code:  E11.9 - Type 2 diabetes mellitus without complications


(3) Aortic stenosis


ICD Code:  I35.0 - Nonrheumatic aortic (valve) stenosis


Status:  Chronic


(4) CAD (coronary artery disease)


ICD Code:  I25.10 - Atherosclerotic heart disease of native coronary artery 

without angina pectoris


Status:  Chronic


Assessment and Plan


Mr. Cho is a 77-year-old male with a history of diabetes mellitus, 

hypertension, hyperlipidemia, aortic stenosis who was sent by his cardiologist 

due to dizziness with exertion and chest tightness.  On admission workup 

indicated hemoglobin 5.6 and hematocrit 18.9.  Patient received 3 units of 

PRBCs.  Due to significant aortic stenosis, cardiology recommended left and 

right heart catheterization.  However during discussion prior to coming to the 

hospital, cardiologist was concerned regarding anemia.





- Severe symptomatic anemia


   - Patient has been on Plavix and aspirin.  His Hemoccult was positive.


   - Status post 3 units of PRBC transfusion.


   - GI is following.  Patient underwent EGD and colonoscopy on 2/24/2018.  No 

significant source of bleeding was identified.


   - Continue Protonix, continue ferrous sulfate 325 mg by mouth daily.


   - Repeat hemoglobin today shows 8.8.  Yesterday, hemoglobin was 10.5.


   - We'll repeat CBC in the morning.  If stable patient can be discharged with 

outpatient follow-up with cardiology as well as gastroenterology.


   - Anemia could also be related to aortic stenosis.





- Severe aortic stenosis


- Coronary artery disease


- Dyslipidemia


   - Cardiology is following.  Plans for left and right heart catheterization 

is on hold currently.


   - Currently on carvedilol 6.25 twice a day, atorvastatin 40 mg daily, 

furosemide 40 mg IV daily.





- Diabetes mellitus


   - Patient takes metformin at home.  We'll continue sliding scale insulin in 

the hospital.  Goal blood glucose 140 - 180. 





Full code.  SCDs.  No pharmacological anticoagulation due to GI bleed.





Discharge plan: Our plan is to discharge patient today.  However his hemoglobin 

dropped from 10.5 to 8.8.  We'll repeat CBC in the morning.  If stable, patient 

can be discharged on 2/26/2018.





Problem Qualifiers





(1) Aortic stenosis:  


Qualified Codes:  I35.0 - Nonrheumatic aortic (valve) stenosis


(2) CAD (coronary artery disease):  


Qualified Codes:  I25.10 - Atherosclerotic heart disease of native coronary 

artery without angina pectoris








Umer Stover DO Feb 25, 2018 13:14

## 2018-02-26 VITALS
DIASTOLIC BLOOD PRESSURE: 53 MMHG | HEART RATE: 71 BPM | RESPIRATION RATE: 18 BRPM | TEMPERATURE: 98.3 F | OXYGEN SATURATION: 92 % | SYSTOLIC BLOOD PRESSURE: 104 MMHG

## 2018-02-26 VITALS
HEART RATE: 74 BPM | RESPIRATION RATE: 18 BRPM | DIASTOLIC BLOOD PRESSURE: 60 MMHG | SYSTOLIC BLOOD PRESSURE: 122 MMHG | TEMPERATURE: 98.1 F | OXYGEN SATURATION: 96 %

## 2018-02-26 VITALS
HEART RATE: 72 BPM | TEMPERATURE: 98.3 F | DIASTOLIC BLOOD PRESSURE: 61 MMHG | RESPIRATION RATE: 18 BRPM | SYSTOLIC BLOOD PRESSURE: 124 MMHG | OXYGEN SATURATION: 95 %

## 2018-02-26 VITALS
HEART RATE: 84 BPM | TEMPERATURE: 97.8 F | RESPIRATION RATE: 17 BRPM | SYSTOLIC BLOOD PRESSURE: 105 MMHG | OXYGEN SATURATION: 96 % | DIASTOLIC BLOOD PRESSURE: 55 MMHG

## 2018-02-26 VITALS
TEMPERATURE: 98.2 F | OXYGEN SATURATION: 94 % | SYSTOLIC BLOOD PRESSURE: 114 MMHG | DIASTOLIC BLOOD PRESSURE: 77 MMHG | HEART RATE: 76 BPM | RESPIRATION RATE: 18 BRPM

## 2018-02-26 VITALS — OXYGEN SATURATION: 97 %

## 2018-02-26 VITALS
OXYGEN SATURATION: 94 % | RESPIRATION RATE: 18 BRPM | HEART RATE: 67 BPM | DIASTOLIC BLOOD PRESSURE: 51 MMHG | SYSTOLIC BLOOD PRESSURE: 102 MMHG | TEMPERATURE: 98.4 F

## 2018-02-26 VITALS
SYSTOLIC BLOOD PRESSURE: 123 MMHG | OXYGEN SATURATION: 95 % | RESPIRATION RATE: 18 BRPM | TEMPERATURE: 98.4 F | DIASTOLIC BLOOD PRESSURE: 61 MMHG | HEART RATE: 77 BPM

## 2018-02-26 VITALS — HEART RATE: 70 BPM

## 2018-02-26 VITALS — HEART RATE: 77 BPM

## 2018-02-26 VITALS — HEART RATE: 71 BPM

## 2018-02-26 VITALS — HEART RATE: 74 BPM

## 2018-02-26 LAB
BASOPHILS # BLD AUTO: 0 TH/MM3 (ref 0–0.2)
BASOPHILS NFR BLD: 0.6 % (ref 0–2)
BUN SERPL-MCNC: 24 MG/DL (ref 7–18)
CALCIUM SERPL-MCNC: 8.2 MG/DL (ref 8.5–10.1)
CHLORIDE SERPL-SCNC: 101 MEQ/L (ref 98–107)
CREAT SERPL-MCNC: 1.45 MG/DL (ref 0.6–1.3)
EOSINOPHIL # BLD: 0.1 TH/MM3 (ref 0–0.4)
EOSINOPHIL NFR BLD: 2.2 % (ref 0–4)
ERYTHROCYTE [DISTWIDTH] IN BLOOD BY AUTOMATED COUNT: 23 % (ref 11.6–17.2)
GFR SERPLBLD BASED ON 1.73 SQ M-ARVRAT: 47 ML/MIN (ref 89–?)
GLUCOSE SERPL-MCNC: 100 MG/DL (ref 74–106)
HCO3 BLD-SCNC: 31.2 MEQ/L (ref 21–32)
HCT VFR BLD CALC: 26.2 % (ref 39–51)
HGB BLD-MCNC: 8.3 GM/DL (ref 13–17)
LYMPHOCYTES # BLD AUTO: 0.7 TH/MM3 (ref 1–4.8)
LYMPHOCYTES NFR BLD AUTO: 11.4 % (ref 9–44)
MCH RBC QN AUTO: 24.9 PG (ref 27–34)
MCHC RBC AUTO-ENTMCNC: 31.5 % (ref 32–36)
MCV RBC AUTO: 79.1 FL (ref 80–100)
MONOCYTE #: 0.5 TH/MM3 (ref 0–0.9)
MONOCYTES NFR BLD: 8.3 % (ref 0–8)
NEUTROPHILS # BLD AUTO: 5 TH/MM3 (ref 1.8–7.7)
NEUTROPHILS NFR BLD AUTO: 77.5 % (ref 16–70)
PLATELET # BLD: 200 TH/MM3 (ref 150–450)
PMV BLD AUTO: 9.5 FL (ref 7–11)
RBC # BLD AUTO: 3.31 MIL/MM3 (ref 4.5–5.9)
SODIUM SERPL-SCNC: 139 MEQ/L (ref 136–145)
WBC # BLD AUTO: 6.4 TH/MM3 (ref 4–11)

## 2018-02-26 RX ADMIN — FUROSEMIDE SCH MG: 10 INJECTION, SOLUTION INTRAMUSCULAR; INTRAVENOUS at 08:42

## 2018-02-26 RX ADMIN — COLCHICINE SCH MG: 0.6 TABLET, FILM COATED ORAL at 08:48

## 2018-02-26 RX ADMIN — INSULIN ASPART SCH: 100 INJECTION, SOLUTION INTRAVENOUS; SUBCUTANEOUS at 08:00

## 2018-02-26 RX ADMIN — CARVEDILOL SCH MG: 6.25 TABLET, FILM COATED ORAL at 21:50

## 2018-02-26 RX ADMIN — INSULIN ASPART SCH: 100 INJECTION, SOLUTION INTRAVENOUS; SUBCUTANEOUS at 11:51

## 2018-02-26 RX ADMIN — FERROUS SULFATE TAB 325 MG (65 MG ELEMENTAL FE) SCH MG: 325 (65 FE) TAB at 08:42

## 2018-02-26 RX ADMIN — ATORVASTATIN CALCIUM SCH MG: 40 TABLET, FILM COATED ORAL at 08:41

## 2018-02-26 RX ADMIN — PANTOPRAZOLE SCH MG: 40 TABLET, DELAYED RELEASE ORAL at 08:41

## 2018-02-26 RX ADMIN — PANTOPRAZOLE SCH MG: 40 TABLET, DELAYED RELEASE ORAL at 21:50

## 2018-02-26 RX ADMIN — PHENYTOIN SODIUM SCH MLS/HR: 50 INJECTION INTRAMUSCULAR; INTRAVENOUS at 06:58

## 2018-02-26 RX ADMIN — CARVEDILOL SCH MG: 6.25 TABLET, FILM COATED ORAL at 08:41

## 2018-02-26 NOTE — HHI.PR
Subjective


Remarks


Follow-up for severe anemia, aortic stenosis. Mr. Cho is doing well. Denies 

any black stool, any bleeding. No chest pain, SOB, fever, chills.





Objective


Vitals





Vital Signs








  Date Time  Temp Pulse Resp B/P (MAP) Pulse Ox O2 Delivery O2 Flow Rate FiO2


 


2/26/18 16:04 98.9 79 18 114/58 (76) 94   


 


2/26/18 15:54  74      


 


2/26/18 12:04  77      


 


2/26/18 12:04 98.3 72 18 124/61 (82) 95   


 


2/26/18 10:33     97 Nasal Cannula 3.00 


 


2/26/18 08:12  77      


 


2/26/18 08:12      Nasal Cannula 2.00 


 


2/26/18 08:04 98.4 77 18 123/61 (81) 95   


 


2/26/18 04:00      Nasal Cannula 2.00 


 


2/26/18 04:00  67      


 


2/26/18 04:00 98.4 70 18 102/51 (68) 94   


 


2/26/18 00:05 97.8 84 17 105/55 (72) 96   


 


2/26/18 00:00  70      


 


2/25/18 23:59      Nasal Cannula 2.00 


 


2/25/18 20:00 97.5 70 17 136/58 (84) 99   


 


2/25/18 20:00      Nasal Cannula 2.00 


 


2/25/18 20:00  81      














I/O      


 


 2/25/18 2/25/18 2/25/18 2/26/18 2/26/18 2/26/18





 07:00 15:00 23:00 07:00 15:00 23:00


 


Intake Total 720 ml  720 ml 480 ml  480 ml


 


Output Total 100 ml     


 


Balance 620 ml  720 ml 480 ml  480 ml


 


      


 


Intake Oral 720 ml  720 ml 480 ml  480 ml


 


Output Urine Total 100 ml     


 


# Voids   3 3  6


 


# Bowel Movements 0     2








Result Diagram:  


2/26/18 0437                                                                   

             2/26/18 0437





Imaging





Last Impressions








Chest X-Ray 2/23/18 0000 Signed





Impressions: 





 Service Date/Time:  Friday, February 23, 2018 05:12 - CONCLUSION: Mild 

failure.  





    Adi Fermin MD 








Objective Remarks


GENERAL: Alert, NAD.


SKIN: Warm and dry.


HEAD: Normocephalic.


EYES: No scleral icterus. No injection or drainage. 


NECK: Supple, trachea midline. No JVD or lymphadenopathy.  Systolic murmur 

appreciated on the right sternal border.


CARDIOVASCULAR: Regular rate and rhythm without gallops, or rubs. 


RESPIRATORY: Breath sounds equal bilaterally. No accessory muscle use.


GASTROINTESTINAL: Abdomen soft, non-tender, nondistended. 


MUSCULOSKELETAL: No cyanosis, or edema. 


BACK: Nontender without obvious deformity. No CVA tenderness.





Procedures


EGD, colonoscopy 2/24/2018.





Irregular Z line and questionable short Lucio biopsy was done from the EG 

junction


Mild gastritis biopsy from the antrum


Multiple polyps ranging in size between 5 mm and one centimeter, small polyps 

were ablated with heat and large polyp removed by snare





A/P


Problem List:  


(1) Severe anemia


ICD Code:  D64.9 - Anemia, unspecified


Status:  Acute


(2) Diabetes mellitus


ICD Code:  E11.9 - Type 2 diabetes mellitus without complications


(3) Aortic stenosis


ICD Code:  I35.0 - Nonrheumatic aortic (valve) stenosis


Status:  Chronic


(4) CAD (coronary artery disease)


ICD Code:  I25.10 - Atherosclerotic heart disease of native coronary artery 

without angina pectoris


Status:  Chronic


Assessment and Plan


Mr. Cho is a 77-year-old male with a history of diabetes mellitus, 

hypertension, hyperlipidemia, aortic stenosis who was sent by his cardiologist 

due to dizziness with exertion and chest tightness.  On admission workup 

indicated hemoglobin 5.6 and hematocrit 18.9.  Patient received 3 units of 

PRBCs.  Due to significant aortic stenosis, cardiology recommended left and 

right heart catheterization.  However during discussion prior to coming to the 

hospital, cardiologist was concerned regarding anemia.





- Severe symptomatic anemia


   - Patient has been on Plavix and aspirin.  His Hemoccult was positive.


   - Status post 3 units of PRBC transfusion.


   - GI is following.  Patient underwent EGD and colonoscopy on 2/24/2018.  No 

significant source of bleeding was identified.


   - Continue Protonix, continue ferrous sulfate 325 mg by mouth daily.


   - Hemoglobin continues to drop 8.8 yesterday to 8.3 today. 


   - Discussed with GI attending who recommended a hematology consult. 


   - Anemia due to aortic stenosis is a possibility. 


   - Will obtain a peripheral smear. Hematology consult pending. 





- Severe aortic stenosis


- Coronary artery disease


- Dyslipidemia


   - Cardiology is following.  Plans for left and right heart catheterization 

is on hold currently.


   - Currently on carvedilol 6.25 twice a day, atorvastatin 40 mg daily, 

furosemide 40 mg IV daily.





- Diabetes mellitus


   - Patient takes metformin at home.  We'll continue sliding scale insulin in 

the hospital.  Goal blood glucose 140 - 180. 





Full code.  SCDs.  No pharmacological anticoagulation due to GI bleed.





Problem Qualifiers





(1) Aortic stenosis:  


Qualified Codes:  I35.0 - Nonrheumatic aortic (valve) stenosis


(2) CAD (coronary artery disease):  


Qualified Codes:  I25.10 - Atherosclerotic heart disease of native coronary 

artery without angina pectoris








Umer Stover DO Feb 26, 2018 19:18

## 2018-02-27 VITALS
HEART RATE: 73 BPM | RESPIRATION RATE: 18 BRPM | SYSTOLIC BLOOD PRESSURE: 124 MMHG | TEMPERATURE: 98.2 F | DIASTOLIC BLOOD PRESSURE: 77 MMHG | OXYGEN SATURATION: 100 %

## 2018-02-27 VITALS — HEART RATE: 74 BPM

## 2018-02-27 VITALS
DIASTOLIC BLOOD PRESSURE: 61 MMHG | TEMPERATURE: 97.5 F | OXYGEN SATURATION: 98 % | RESPIRATION RATE: 19 BRPM | HEART RATE: 76 BPM | SYSTOLIC BLOOD PRESSURE: 126 MMHG

## 2018-02-27 VITALS — OXYGEN SATURATION: 98 %

## 2018-02-27 VITALS — HEART RATE: 65 BPM

## 2018-02-27 RX ADMIN — FERROUS SULFATE TAB 325 MG (65 MG ELEMENTAL FE) SCH MG: 325 (65 FE) TAB at 08:14

## 2018-02-27 RX ADMIN — COLCHICINE SCH MG: 0.6 TABLET, FILM COATED ORAL at 08:15

## 2018-02-27 RX ADMIN — ATORVASTATIN CALCIUM SCH MG: 40 TABLET, FILM COATED ORAL at 08:15

## 2018-02-27 RX ADMIN — CARVEDILOL SCH MG: 6.25 TABLET, FILM COATED ORAL at 08:15

## 2018-02-27 RX ADMIN — PHENYTOIN SODIUM SCH MLS/HR: 50 INJECTION INTRAMUSCULAR; INTRAVENOUS at 08:21

## 2018-02-27 RX ADMIN — FUROSEMIDE SCH MG: 10 INJECTION, SOLUTION INTRAMUSCULAR; INTRAVENOUS at 08:14

## 2018-02-27 RX ADMIN — PANTOPRAZOLE SCH MG: 40 TABLET, DELAYED RELEASE ORAL at 08:15

## 2018-02-27 NOTE — PD.ONC.PN
Subjective


Subjective


Remarks


pt seen at 8:55 am


feels better.


wants to go home.





Objective


Data











  Date Time  Temp Pulse Resp B/P (MAP) Pulse Ox O2 Delivery O2 Flow Rate FiO2


 


2/27/18 12:15     98  2.00 


 


2/27/18 08:01 97.5 76 19 126/61 (82) 98   


 


2/27/18 04:09      Nasal Cannula 1.00 


 


2/27/18 04:05 98.2 73 18 124/77 (93) 100   


 


2/27/18 04:00  74      


 


2/27/18 00:00  65      


 


2/27/18 00:00      Nasal Cannula 2.00 


 


2/26/18 23:10 98.3 71 18 104/53 (70) 92   


 


2/26/18 20:00      Room Air  


 


2/26/18 20:00  71      


 


2/26/18 19:40 98.1 74 18 122/60 (80) 96   


 


2/26/18 16:04 98.9 79 18 114/58 (76) 94   


 


2/26/18 15:54  74      














 2/27/18 2/27/18 2/27/18





 07:00 15:00 23:00


 


Intake Total 0 ml  


 


Output Total 150 ml  


 


Balance -150 ml  








Result Diagram:  


2/26/18 0437                                                                   

             2/26/18 0437





Laboratory Results





Laboratory Tests








Test


  2/27/18


04:30


 


Ferritin 39 NG/ML 








Imaging Studies





Last 24 hours Impressions








Chest X-Ray 2/27/18 0000 Signed





Impressions: 





 Service Date/Time:  Tuesday, February 27, 2018 10:42 - CONCLUSION:  Minimal 





 interstitial edema. Previous identified pulmonary edema has resolved.     Arash Srivastava MD 








Objective Remarks


GENERAL: Well-nourished, well-developed patient.


SKIN: Warm and dry.


HEAD: Normocephalic.


EYES: No scleral icterus. No injection or drainage. 


NECK: Supple, trachea midline. No JVD or lymphadenopathy.


LYMPHATIC: No adenopathy.


CARDIOVASCULAR: Regular rate and rhythm without murmurs. 


RESPIRATORY: Breath sounds equal bilaterally. No accessory muscle use.


GASTROINTESTINAL: Abdomen soft, non-tender, nondistended. 


EXTREMITIES: No cyanosis, or edema. 


NEUROLOGICAL: No obvious focal deficit. Awake, alert, and oriented x3.





Assessment/Plan


Problem List:  


(1) Severe anemia


ICD Codes:  D64.9 - Anemia, unspecified


Status:  Acute


Plan:  Ferritin 38.


He has JUAN due to GI bleeding. Stool heme positive but EGD and colonoscopy 

negative,


recommend oral iron first if he does not respond to it then will give iron 

infusion in our clinic.


d/w pt to make appt for follow up.


Ok to d/c.














Elijah Armando MD Feb 27, 2018 13:57

## 2018-02-27 NOTE — MB
cc:

Elijah Armando MD

****

 

 

DATE OF CONSULT:

02/26/2018

 

REASON FOR CONSULTATION:

Consult requested by hospitalist for evaluation of microcytic 

hypochromic anemia.

 

HISTORY OF PRESENT ILLNESS:

Tang is a person, 77-year-old male.  He came into the hospital 

complaining of severe weakness, dyspnea on exertion, and recurrent 

dizziness.  He had a blood transfusion, hemoglobin 5.5.  He is 

admitted to the hospital.  He denies any blood in his stool.  He 

underwent upper endoscopy and end colonoscopy 2 days ago, which failed

to reveal any bleeding or any malignancy.  He had received a blood 

transfusion.  I have been asked to see him for further evaluation.

 

Patient states that he is feeling somewhat better after the blood 

transfusion.  He is anxious to go home.  He states that he was told 

that he would be discharged later today.  Patient was started on oral 

iron.

 

The rest of the review of systems is negative.

 

PAST MEDICAL HISTORY:

Hypertension, hypercholesterolemia, gout, diabetes mellitus, aortic 

stenosis, coronary artery disease.  Also, he has history of anemia.

 

PAST SURGICAL HISTORY:

Coronary artery bypass surgery and a heart valve surgery.

 

ALLERGIES:

NONE.

 

MEDICATIONS:

Prior to going to the hospital, vitamin B12 tablets, simvastatin, 

metformin, lisinopril, Lasix, folic acid, ferrous sulfate, daily 

colchicine, carvedilol, aspirin.

 

FAMILY HISTORY:

None for any malignancy.

 

SOCIAL HISTORY:

Patient used to smoke cigarettes, quit in 1950.  He does not drink 

alcohol.

 

PHYSICAL EXAMINATION:

A well-developed, well-nourished male in no apparent distress.

VITAL SIGNS:  Temperature 98.9, heart rate 79, blood pressure 114/58.

HEENT:  PERRLA, EOMI, anicteric.  No oral lesions noted.

NECK:  No lymphadenopathy noted.

LUNGS:  Clear.  No wheezing, rhonchi, rales.

HEART:  Regular rate and rhythm.

ABDOMEN:  Soft, nontender, no hepatosplenomegaly.

EXTREMITIES:  No pedal edema.

NEUROLOGIC:  Awake, alert, oriented x 3.

SKIN:  No significant lesions noted.

 

ASSESSMENT:

Severe hypochromic microcytic anemia.  This is due to iron deficiency 

from possible gastrointestinal bleeding, although he recently had a 

gastrointestinal workup, which came back negative. But stool are heme positive.

 

PLAN:

I have reviewed his available records, and I have discussed with the 

patient regarding the anemia.  Patient states that several years ago, 

when he used to live in [place], he had severe anemia.  This was 

treated with oral iron, and his anemia improved.  Recently, his 

primary physician advised him to cut back on the iron and now he has 

become anemic.  On admission His hemoglobin was very low at 5.5, MCV  75.  B12 
is

1107.  Serum iron is 82, TIBC 314.  iron saturation is 26.1.  The 

ferritin was not ordered.

 

Patient underwent upper endoscopy and colonoscopy over the weekend.  

This  does not show any significant findings in terms of Bleeding , malignancy 
and anemia.  

I agree to start him on oral iron 3 times a day on an empty stomach.  

In my opinion, patient could be discharged to home and I will follow 

him as an outpatient.

 

Thank you for asking my opinion.

 

 

__________________________________

MD CHIKA Rodas/SALEEM

D: 02/26/2018, 10:29 PM

T: 02/27/2018, 08:11 AM

Visit #: [id number]

Job #: [id number]

[place]

## 2018-02-27 NOTE — RADRPT
EXAM DATE/TIME:  02/27/2018 10:42 

 

[facility] COMPARISON:     

CHEST SINGLE AP, February 23, 2018, 5:12.

 

                     

INDICATIONS :     

Congestive heart failure.

                     

 

MEDICAL HISTORY :     

None.          

 

SURGICAL HISTORY :     

None.   

 

ENCOUNTER:     

Subsequent                                        

 

ACUITY:     

2 days      

 

PAIN SCORE:     

0/10

 

LOCATION:     

Bilateral chest 

 

FINDINGS:     

A single view of the chest demonstrates minimal interstitial edema without evidence of mass, infiltra
te or effusion.  The cardiomediastinal contours are unremarkable.  Heart mildly enlarged. Osseous str
uctures are intact.

 

CONCLUSION:     

Minimal interstitial edema. Previous identified pulmonary edema has resolved.

 

 

 

 Arash Srivastava MD on February 27, 2018 at 11:14           

Board Certified Radiologist.

 This report was verified electronically.

## 2018-02-27 NOTE — HHI.DS
__________________________________________________





Discharge Summary


Admission Date


Feb 21, 2018 at 14:43


Discharge Date:  Feb 27, 2018


Admitting Diagnosis


Anemia.





(1) Severe anemia


ICD Code:  D64.9 - Anemia, unspecified


Diagnosis:  Principal


Status:  Acute


(2) Diabetes mellitus


ICD Code:  E11.9 - Type 2 diabetes mellitus without complications


(3) Aortic stenosis


ICD Code:  I35.0 - Nonrheumatic aortic (valve) stenosis


Status:  Chronic


(4) CAD (coronary artery disease)


ICD Code:  I25.10 - Atherosclerotic heart disease of native coronary artery 

without angina pectoris


Status:  Chronic


Procedures


EGD, colonoscopy 2/24/2018.





Irregular Z line and questionable short Lucio biopsy was done from the EG 

junction


Mild gastritis biopsy from the antrum


Multiple polyps ranging in size between 5 mm and one centimeter, small polyps 

were ablated with heat and large polyp removed by snare


Brief History - From Admission


Mr. Cho is a 77-year-old male.  He has a history of coronary artery disease 

and aortic stenosis.  He was sent to his cardiologist due to complaints of 

recurrent dizziness on exertion and chest tightness.  She is found to have a 

hemoglobin of 5.5.  Patient reports a previous history of anemia.  He's 

uncertain of any previous cause such as GI bleed, bone marrow suppression, or 

blood cell destruction.  He cannot recall any bright red blood in his stools or 

any change in normal color and consistency.  Stool guaiac has been ordered and 

is pending.  Blood transfusion has been ordered and is in process.  Other 

medical conditions are diabetes mellitus type 2, hypertension, hyperlipidemia, 

and gout.  Evidence of acute kidney injury is present but could be related to 

anemia.


CBC/BMP:  


2/26/18 0437                                                                   

             2/26/18 0437





Significant Findings





Laboratory Tests








Test


  2/25/18


06:20 2/25/18


11:15 2/26/18


04:37 2/27/18


04:30


 


Blood Urea Nitrogen


  26 MG/DL


(7-18) 


  24 MG/DL


(7-18) 


 


 


Creatinine


  1.39 MG/DL


(0.60-1.30) 


  1.45 MG/DL


(0.60-1.30) 


 


 


Estimat Glomerular Filtration


Rate 49 ML/MIN


(>89) 


  47 ML/MIN


(>89) 


 


 


Hemoglobin


  


  8.8 GM/DL


(13.0-17.0) 8.3 GM/DL


(13.0-17.0) 


 


 


Hematocrit


  


  27.8 %


(39.0-51.0) 26.2 %


(39.0-51.0) 


 


 


Red Blood Count


  


  


  3.31 MIL/MM3


(4.50-5.90) 


 


 


Mean Corpuscular Volume


  


  


  79.1 FL


(80.0-100.0) 


 


 


Mean Corpuscular Hemoglobin


  


  


  24.9 PG


(27.0-34.0) 


 


 


Mean Corpuscular Hemoglobin


Concent 


  


  31.5 %


(32.0-36.0) 


 


 


Red Cell Distribution Width


  


  


  23.0 %


(11.6-17.2) 


 


 


Neutrophils (%) (Auto)


  


  


  77.5 %


(16.0-70.0) 


 


 


Monocytes (%) (Auto)


  


  


  8.3 %


(0.0-8.0) 


 


 


Lymphocytes # (Auto)


  


  


  0.7 TH/MM3


(1.0-4.8) 


 


 


Calcium Level


  


  


  8.2 MG/DL


(8.5-10.1) 


 








Imaging





Last Impressions








Chest X-Ray 2/23/18 0000 Signed





Impressions: 





 Service Date/Time:  Friday, February 23, 2018 05:12 - CONCLUSION: Mild 

failure.  





    Adi Fermin MD 








PE at Discharge


GENERAL: Alert, NAD.


SKIN: Warm and dry.


HEAD: Normocephalic.


EYES: No scleral icterus. No injection or drainage. 


NECK: Supple, trachea midline. No JVD or lymphadenopathy.  Systolic murmur 

appreciated on the right sternal border.


CARDIOVASCULAR: Regular rate and rhythm without gallops, or rubs. 


RESPIRATORY: Breath sounds equal bilaterally. No accessory muscle use.


GASTROINTESTINAL: Abdomen soft, non-tender, nondistended. 


MUSCULOSKELETAL: No cyanosis, or edema. 


BACK: Nontender without obvious deformity. No CVA tenderness.





Pt update on day of discharge


Patient is doing well. No acute concerns. Denies any chest pain, SOB, fever, 

chills.


Hospital Course


Mr. Cho is a 77-year-old male with a history of diabetes mellitus, 

hypertension, hyperlipidemia, aortic stenosis who was sent by his cardiologist 

due to dizziness with exertion and chest tightness.  On admission workup 

indicated hemoglobin 5.6 and hematocrit 18.9.  Patient received 3 units of 

PRBCs.  Due to significant aortic stenosis, cardiology recommended left and 

right heart catheterization.  However during discussion prior to coming to the 

hospital, cardiologist was concerned regarding anemia.





- Severe symptomatic anemia


   - Patient has been on Plavix and aspirin.  His Hemoccult was positive.


   - Status post 3 units of PRBC transfusion.


   - GI is following.  Patient underwent EGD and colonoscopy on 2/24/2018.  No 

significant source of bleeding was identified.


   - Continue Protonix, continue ferrous sulfate 325 mg by mouth daily.


   - Hemoglobin continues to drop 8.8 yesterday to 8.3 today. 


   - Discussed with GI attending who recommended a hematology consult. 


   - Anemia due to aortic stenosis is a possibility. 


   - Hematology evaluated patient and will follow patient in the outpatient 

setting. Okay to discharge from hematology. 





- Severe aortic stenosis


- Coronary artery disease


- Dyslipidemia


   - Cardiology is following.  Plans for left and right heart catheterization 

is on hold currently.


   - Currently on carvedilol 6.25 twice a day, atorvastatin 40 mg daily, 

furosemide 40 mg IV daily.


   - Will d/c patient on Torsemide 10mg BID. He can follow up with Dr. Bo 

after discharge. 





- Diabetes mellitus


   - Patient takes metformin at home.  We'll continue sliding scale insulin in 

the hospital.  Goal blood glucose 140 - 180. 


   - Metformin upon discharge. Creatinine 1.45 with eGFR 47. 





Full code.  SCDs.  No pharmacological anticoagulation due to GI bleed.


Pt Condition on Discharge:  Good


Discharge Disposition:  Discharge Home


Discharge Time:  > 30 minutes


Discharge Instructions


DIET: Follow Instructions for:  Heart Healthy Diet


Activities you can perform:  Regular-No Restrictions


Follow up Referrals:  


Cardiology - 1 Week with Paul Bo DO


Gastroenterology - 1 Week with Meliza Colvin MD


PCP Follow-up - 1 Week





New Medications:  


Torsemide (Torsemide) 10 Mg Tab


10 MG PO BID for Heart, #60 TAB 3 Refills





Atorvastatin (Atorvastatin) 40 Mg Tab


40 MG PO DAILY for Cholesterol Management, #90 TAB 3 Refills





 


Continued Medications:  


Carvedilol (Carvedilol) 6.25 Mg Tab


6.25 MG PO BID, #60 TAB 0 Refills





Colchicine (Colchicine) 0.6 Mg Cap


0.5 MG PO DAILY for Gout, CAP 0 Refills





Cyanocobalamin (Vitamin B-12) 1,000 Mcg Tab


1000 MCG PO DAILY for Nutritional Supplement, #1 BOTTLE 0 Refills





Ferrous Sulfate (Ferrous Sulfate) 325 Mg (65 Mg Iron) Tablet


325 MG PO DAILY for Nutritional Supplement, #30 TAB 0 Refills





Folic Acid (Folic Acid) 0.4 Mg Tab


400 MCG PO DAILY for Nutritional Supplement, TAB 0 Refills





Lisinopril (Lisinopril) 5 Mg Tab


5 MG PO DAILY for Blood Pressure Management, #30 TAB 0 Refills





Metformin (Metformin) 500 Mg Tab


500 MG PO DAILY for Blood Sugar Management, #30 TAB 0 Refills


With a meal


 


Discontinued Medications:  


Aspirin (Aspirin) 81 Mg Chew


81 MG CHEW DAILY, TAB 0 Refills





Furosemide (Furosemide) 20 Mg Tab


20 MG PO DAILY, #30 TAB 0 Refills





Simvastatin (Simvastatin) 80 Mg Tab


80 MG PO DAILY for Cholesterol Management, #30 TAB 0 Refills

















Umre Stover DO Feb 27, 2018 10:46

## 2018-03-30 ENCOUNTER — HOSPITAL ENCOUNTER (INPATIENT)
Dept: HOSPITAL 17 - NEPC | Age: 78
LOS: 7 days | Discharge: HOME HEALTH | DRG: 286 | End: 2018-04-06
Attending: HOSPITALIST | Admitting: HOSPITALIST
Payer: COMMERCIAL

## 2018-03-30 VITALS
DIASTOLIC BLOOD PRESSURE: 94 MMHG | OXYGEN SATURATION: 92 % | SYSTOLIC BLOOD PRESSURE: 211 MMHG | HEART RATE: 108 BPM | TEMPERATURE: 97.8 F | RESPIRATION RATE: 24 BRPM

## 2018-03-30 VITALS
TEMPERATURE: 97.9 F | HEART RATE: 100 BPM | SYSTOLIC BLOOD PRESSURE: 131 MMHG | RESPIRATION RATE: 18 BRPM | OXYGEN SATURATION: 96 % | DIASTOLIC BLOOD PRESSURE: 75 MMHG

## 2018-03-30 VITALS
RESPIRATION RATE: 17 BRPM | DIASTOLIC BLOOD PRESSURE: 68 MMHG | OXYGEN SATURATION: 96 % | SYSTOLIC BLOOD PRESSURE: 128 MMHG | HEART RATE: 88 BPM

## 2018-03-30 VITALS — OXYGEN SATURATION: 96 %

## 2018-03-30 VITALS
OXYGEN SATURATION: 96 % | RESPIRATION RATE: 24 BRPM | DIASTOLIC BLOOD PRESSURE: 79 MMHG | HEART RATE: 105 BPM | SYSTOLIC BLOOD PRESSURE: 139 MMHG

## 2018-03-30 VITALS — WEIGHT: 178.13 LBS | BODY MASS INDEX: 27.96 KG/M2 | HEIGHT: 67 IN

## 2018-03-30 DIAGNOSIS — Z82.3: ICD-10-CM

## 2018-03-30 DIAGNOSIS — E78.5: ICD-10-CM

## 2018-03-30 DIAGNOSIS — Z79.84: ICD-10-CM

## 2018-03-30 DIAGNOSIS — I50.23: ICD-10-CM

## 2018-03-30 DIAGNOSIS — I25.10: ICD-10-CM

## 2018-03-30 DIAGNOSIS — N17.9: ICD-10-CM

## 2018-03-30 DIAGNOSIS — I13.0: Primary | ICD-10-CM

## 2018-03-30 DIAGNOSIS — N18.3: ICD-10-CM

## 2018-03-30 DIAGNOSIS — I25.5: ICD-10-CM

## 2018-03-30 DIAGNOSIS — Z91.11: ICD-10-CM

## 2018-03-30 DIAGNOSIS — I08.0: ICD-10-CM

## 2018-03-30 DIAGNOSIS — D50.9: ICD-10-CM

## 2018-03-30 DIAGNOSIS — E87.6: ICD-10-CM

## 2018-03-30 DIAGNOSIS — Z87.891: ICD-10-CM

## 2018-03-30 DIAGNOSIS — E11.22: ICD-10-CM

## 2018-03-30 DIAGNOSIS — M10.9: ICD-10-CM

## 2018-03-30 LAB
ALBUMIN SERPL-MCNC: 4.1 GM/DL (ref 3.4–5)
ALP SERPL-CCNC: 118 U/L (ref 45–117)
ALT SERPL-CCNC: 27 U/L (ref 12–78)
AST SERPL-CCNC: 27 U/L (ref 15–37)
BASOPHILS # BLD AUTO: 0.1 TH/MM3 (ref 0–0.2)
BASOPHILS NFR BLD: 0.9 % (ref 0–2)
BILIRUB SERPL-MCNC: 1 MG/DL (ref 0.2–1)
BUN SERPL-MCNC: 17 MG/DL (ref 7–18)
CALCIUM SERPL-MCNC: 8.9 MG/DL (ref 8.5–10.1)
CHLORIDE SERPL-SCNC: 109 MEQ/L (ref 98–107)
CREAT SERPL-MCNC: 1.33 MG/DL (ref 0.6–1.3)
EOSINOPHIL # BLD: 0.1 TH/MM3 (ref 0–0.4)
EOSINOPHIL NFR BLD: 1.6 % (ref 0–4)
ERYTHROCYTE [DISTWIDTH] IN BLOOD BY AUTOMATED COUNT: 24.6 % (ref 11.6–17.2)
GFR SERPLBLD BASED ON 1.73 SQ M-ARVRAT: 52 ML/MIN (ref 89–?)
GLUCOSE SERPL-MCNC: 142 MG/DL (ref 74–106)
HCO3 BLD-SCNC: 23.2 MEQ/L (ref 21–32)
HCT VFR BLD CALC: 35.1 % (ref 39–51)
HGB BLD-MCNC: 11 GM/DL (ref 13–17)
LYMPHOCYTES # BLD AUTO: 0.9 TH/MM3 (ref 1–4.8)
LYMPHOCYTES NFR BLD AUTO: 11.7 % (ref 9–44)
MCH RBC QN AUTO: 27.2 PG (ref 27–34)
MCHC RBC AUTO-ENTMCNC: 31.2 % (ref 32–36)
MCV RBC AUTO: 87.1 FL (ref 80–100)
MONOCYTE #: 0.4 TH/MM3 (ref 0–0.9)
MONOCYTES NFR BLD: 5 % (ref 0–8)
NEUTROPHILS # BLD AUTO: 6.1 TH/MM3 (ref 1.8–7.7)
NEUTROPHILS NFR BLD AUTO: 80.8 % (ref 16–70)
PLATELET # BLD: 215 TH/MM3 (ref 150–450)
PMV BLD AUTO: 10 FL (ref 7–11)
PROT SERPL-MCNC: 7.6 GM/DL (ref 6.4–8.2)
RBC # BLD AUTO: 4.03 MIL/MM3 (ref 4.5–5.9)
SODIUM SERPL-SCNC: 140 MEQ/L (ref 136–145)
TROPONIN I SERPL-MCNC: 0.02 NG/ML (ref 0.02–0.05)
TROPONIN I SERPL-MCNC: 0.02 NG/ML (ref 0.02–0.05)
WBC # BLD AUTO: 7.5 TH/MM3 (ref 4–11)

## 2018-03-30 PROCEDURE — 82550 ASSAY OF CK (CPK): CPT

## 2018-03-30 PROCEDURE — 93306 TTE W/DOPPLER COMPLETE: CPT

## 2018-03-30 PROCEDURE — 71045 X-RAY EXAM CHEST 1 VIEW: CPT

## 2018-03-30 PROCEDURE — 99153 MOD SED SAME PHYS/QHP EA: CPT

## 2018-03-30 PROCEDURE — 84484 ASSAY OF TROPONIN QUANT: CPT

## 2018-03-30 PROCEDURE — 80048 BASIC METABOLIC PNL TOTAL CA: CPT

## 2018-03-30 PROCEDURE — 85027 COMPLETE CBC AUTOMATED: CPT

## 2018-03-30 PROCEDURE — 94664 DEMO&/EVAL PT USE INHALER: CPT

## 2018-03-30 PROCEDURE — 94010 BREATHING CAPACITY TEST: CPT

## 2018-03-30 PROCEDURE — G0269 OCCLUSIVE DEVICE IN VEIN ART: HCPCS

## 2018-03-30 PROCEDURE — C1769 GUIDE WIRE: HCPCS

## 2018-03-30 PROCEDURE — 82948 REAGENT STRIP/BLOOD GLUCOSE: CPT

## 2018-03-30 PROCEDURE — 80061 LIPID PANEL: CPT

## 2018-03-30 PROCEDURE — C1760 CLOSURE DEV, VASC: HCPCS

## 2018-03-30 PROCEDURE — C9113 INJ PANTOPRAZOLE SODIUM, VIA: HCPCS

## 2018-03-30 PROCEDURE — 85025 COMPLETE CBC W/AUTO DIFF WBC: CPT

## 2018-03-30 PROCEDURE — 85014 HEMATOCRIT: CPT

## 2018-03-30 PROCEDURE — 83036 HEMOGLOBIN GLYCOSYLATED A1C: CPT

## 2018-03-30 PROCEDURE — G0378 HOSPITAL OBSERVATION PER HR: HCPCS

## 2018-03-30 PROCEDURE — 82040 ASSAY OF SERUM ALBUMIN: CPT

## 2018-03-30 PROCEDURE — 74174 CTA ABD&PLVS W/CONTRAST: CPT

## 2018-03-30 PROCEDURE — 84439 ASSAY OF FREE THYROXINE: CPT

## 2018-03-30 PROCEDURE — 99152 MOD SED SAME PHYS/QHP 5/>YRS: CPT

## 2018-03-30 PROCEDURE — 80053 COMPREHEN METABOLIC PANEL: CPT

## 2018-03-30 PROCEDURE — 94640 AIRWAY INHALATION TREATMENT: CPT

## 2018-03-30 PROCEDURE — 85018 HEMOGLOBIN: CPT

## 2018-03-30 PROCEDURE — 93005 ELECTROCARDIOGRAM TRACING: CPT

## 2018-03-30 PROCEDURE — 96376 TX/PRO/DX INJ SAME DRUG ADON: CPT

## 2018-03-30 PROCEDURE — 96361 HYDRATE IV INFUSION ADD-ON: CPT

## 2018-03-30 PROCEDURE — C1893 INTRO/SHEATH, FIXED,NON-PEEL: HCPCS

## 2018-03-30 PROCEDURE — 84100 ASSAY OF PHOSPHORUS: CPT

## 2018-03-30 PROCEDURE — 85379 FIBRIN DEGRADATION QUANT: CPT

## 2018-03-30 PROCEDURE — 84443 ASSAY THYROID STIM HORMONE: CPT

## 2018-03-30 PROCEDURE — 83735 ASSAY OF MAGNESIUM: CPT

## 2018-03-30 PROCEDURE — 96374 THER/PROPH/DIAG INJ IV PUSH: CPT

## 2018-03-30 PROCEDURE — 93456 R HRT CORONARY ARTERY ANGIO: CPT

## 2018-03-30 PROCEDURE — 83880 ASSAY OF NATRIURETIC PEPTIDE: CPT

## 2018-03-30 PROCEDURE — 82810 BLOOD GASES O2 SAT ONLY: CPT

## 2018-03-30 PROCEDURE — 82272 OCCULT BLD FECES 1-3 TESTS: CPT

## 2018-03-30 RX ADMIN — STANDARDIZED SENNA CONCENTRATE AND DOCUSATE SODIUM SCH TAB: 8.6; 5 TABLET, FILM COATED ORAL at 22:06

## 2018-03-30 RX ADMIN — IPRATROPIUM BROMIDE AND ALBUTEROL SULFATE SCH AMPULE: .5; 3 SOLUTION RESPIRATORY (INHALATION) at 15:46

## 2018-03-30 RX ADMIN — HEPARIN SODIUM SCH UNITS: 10000 INJECTION, SOLUTION INTRAVENOUS; SUBCUTANEOUS at 18:46

## 2018-03-30 NOTE — RADRPT
EXAM DATE/TIME:  03/30/2018 15:32 

 

[facility] COMPARISON:     

CHEST SINGLE AP, February 27, 2018, 10:42.

 

                     

INDICATIONS :     

Shortness of breath. Cough. 

                     

 

MEDICAL HISTORY :     

Hypertension.          

 

SURGICAL HISTORY :     

None.   

 

ENCOUNTER:     

Initial                                        

 

ACUITY:     

3 days      

 

PAIN SCORE:     

0/10

 

LOCATION:     

Bilateral chest 

 

FINDINGS:     

A single view of the chest demonstrates persistent interstitial prominence. There is developing right
 basilar consolidation/effusion with some atelectatic changes in the left base. Heart size is borderl
ine. Osseous structures are intact.

 

CONCLUSION:     

1. Right basilar consolidation with possible small effusion.

2. Mild atelectatic changes of the left hemidiaphragm.

3. Slight interstitial prominence which could represent some degree of vascular congestion or volume 
overload. Heart size is borderline prominent.

 

 

 

 Gal Mayfield MD on March 30, 2018 at 15:54           

Board Certified Radiologist.

 This report was verified electronically. 62

## 2018-03-30 NOTE — HHI.HP
__________________________________________________





\[facility]\""


Service


[facility]


Primary Care Physician


Unknown


Admission Diagnosis





CHF


Diagnoses:  


Travel History


International Travel<30 Days:  No


Contact w/Intl Traveler <30 Da:  No


Traveled to Known Affected Are:  No


History of Present Illness


77 years old male with history of diabetes mellitus hypertension hyperlipidemia 

and aortic stenosis and gout presented to the ED with history of 3 day 

worsening short of breath  on rest but get even worsened with exertion, patient 

was referred from his PCP office when he went today to check about the short of 

breath.  Patient is on O2 nasal cannula he has also history for coronary artery 

disease he has a heart cath he was not sure if he does have a stent patient 

also had a colonoscopy about 3 weeks ago here in the hospital for GI bleed.  

Patient denied chest pain or other symptoms





Review of Systems


All  systems reviewed and was positive for what is mentioned in history of 

present illness otherwise negative





Past Family Social History


Past Medical History


CHF


Aortic stenosis


AVR


CAD


CKD


Hypertension


Past Surgical History


Heart cath and colonoscopy


Allergies:  


Coded Allergies:  


     No Known Allergies (Verified  Allergy, Unknown, 18)


Family History


Review with the patient,not aware of significant medical history related to her 

problem runs in the family


Social History


Denied tobacco alcohol or illicit drug abuse





Physical Exam


Vital Signs





Vital Signs








  Date Time  Temp Pulse Resp B/P (MAP) Pulse Ox O2 Delivery O2 Flow Rate FiO2


 


3/30/18 18:48  88 17 128/68 (88) 96 Nasal Cannula 2.00 


 


3/30/18 15:43  105 24 139/79 (99) 96 Nasal Cannula 2.00 


 


3/30/18 15:17     92 Room Air  


 


3/30/18 15:17     96 Nasal Cannula 2.00 


 


3/30/18 15:17     96 Nasal Cannula 2.00 


 


3/30/18 15:01 97.8 108 24 211/94 (133) 92   








Physical Exam


GENERAL: This is a well-nourished, well-developed patient, in no apparent 

distress.


SKIN: No rashes, warm and dry 


HEAD: Atraumatic. Normocephalic. 


EYES: Pupils equal round and reactive. Extraocular motions intact. No scleral 

icterus. 


ENT: Nose without bleeding, or drainage, Airway patent.


NECK: Trachea midline.  Supple


CARDIOVASCULAR: Regular rate and rhythm 3 out of 6 systolic murmur


RESPIRATORY: Positive Rales and crackles bibasilar


GASTROINTESTINAL: Abdomen soft, non-tender, nondistended.  Positive bowel sounds


MUSCULOSKELETAL: Extremities without clubbing, cyanosis, +2.  Pedal pulses 

appreciated


NEUROLOGICAL: Awake and alert.  Moves all extremity.  Normal speech.no focal 

neurological deficit


Laboratory





Laboratory Tests








Test


  3/30/18


15:00


 


White Blood Count 7.5 


 


Red Blood Count 4.03 


 


Hemoglobin 11.0 


 


Hematocrit 35.1 


 


Mean Corpuscular Volume 87.1 


 


Mean Corpuscular Hemoglobin 27.2 


 


Mean Corpuscular Hemoglobin


Concent 31.2 


 


 


Red Cell Distribution Width 24.6 


 


Platelet Count 215 


 


Mean Platelet Volume 10.0 


 


Neutrophils (%) (Auto) 80.8 


 


Lymphocytes (%) (Auto) 11.7 


 


Monocytes (%) (Auto) 5.0 


 


Eosinophils (%) (Auto) 1.6 


 


Basophils (%) (Auto) 0.9 


 


Neutrophils # (Auto) 6.1 


 


Lymphocytes # (Auto) 0.9 


 


Monocytes # (Auto) 0.4 


 


Eosinophils # (Auto) 0.1 


 


Basophils # (Auto) 0.1 


 


CBC Comment DIFF FINAL 


 


Differential Comment  


 


D-Dimer Quantitative (PE/DVT) 0.78 


 


Blood Urea Nitrogen 17 


 


Creatinine 1.33 


 


Random Glucose 142 


 


Total Protein 7.6 


 


Albumin 4.1 


 


Calcium Level 8.9 


 


Alkaline Phosphatase 118 


 


Aspartate Amino Transf


(AST/SGOT) 27 


 


 


Alanine Aminotransferase


(ALT/SGPT) 27 


 


 


Total Bilirubin 1.0 


 


Sodium Level 140 


 


Potassium Level 5.0 


 


Chloride Level 109 


 


Carbon Dioxide Level 23.2 


 


Anion Gap 8 


 


Estimat Glomerular Filtration


Rate 52 


 


 


Troponin I 0.02 


 


B-Type Natriuretic Peptide 4320 








Result Diagram:  


3/30/18 1500                                                                   

             3/30/18 1500





Imaging





Last Impressions








Chest X-Ray 3/30/18 1503 Signed





Impressions: 





 Service Date/Time:  2018 15:32 - CONCLUSION:  1. Right 

basilar 





 consolidation with possible small effusion. 2. Mild atelectatic changes of the 





 left hemidiaphragm. 3. Slight interstitial prominence which could represent 

some 





 degree of vascular congestion or volume overload. Heart size is borderline 





 prominent.     MD Brenda Pateli VTE Risk Assessment


Caprini VTE Risk Assessment:  Mod/High Risk (score >= 2)


Caprini Risk Assessment Model











 Point Value = 1          Point Value = 2  Point Value = 3  Point Value = 5


 


Age 41-60


Minor surgery


BMI > 25 kg/m2


Swollen legs


Varicose veins


Pregnancy or postpartum


History of unexplained or recurrent


   spontaneous 


Oral contraceptives or hormone


   replacement


Sepsis (< 1 month)


Serious lung disease, including


   pneumonia (< 1 month)


Abnormal pulmonary function


Acute myocardial infarction


Congestive heart failure (< 1 month)


History of inflammatory bowel disease


Medical patient at bed rest Age 61-74


Arthroscopic surgery


Major open surgery (> 45 min)


Laparoscopic surgery (> 45 min)


Malignancy


Confined to bed (> 72 hours)


Immobilizing plaster cast


Central venous access Age >= 75


History of VTE


Family history of VTE


Factor V Leiden


Prothrombin 96283G


Lupus anticoagulant


Anticardiolipin antibodies


Elevated serum homocysteine


Heparin-induced thrombocytopenia


Other congenital or acquired


   thrombophilia Stroke (< 1 month)


Elective arthroplasty


Hip, pelvis, or leg fracture


Acute spinal cord injury (< 1 month)








Prophylaxis Regimen











   Total Risk


Factor Score Risk Level Prophylaxis Regimen


 


0-1      Low Early ambulation


 


2 Moderate Order ONE of the following:


*Sequential Compression Device (SCD)


*Heparin 5000 units SQ BID


 


3-4 Higher Order ONE of the following medications:


*Heparin 5000 units SQ TID


*Enoxaparin/Lovenox 40 mg SQ daily (WT < 150 kg, CrCl > 30 mL/min)


*Enoxaparin/Lovenox 30 mg SQ daily (WT < 150 kg, CrCl > 10-29 mL/min)


*Enoxaparin/Lovenox 30 mg SQ BID (WT < 150 kg, CrCl > 30 mL/min)


AND/OR


*Sequential Compression Device (SCD)


 


5 or more Highest Order ONE of the following medications:


*Heparin 5000 units SQ TID (Preferred with Epidurals)


*Enoxaparin/Lovenox 40 mg SQ daily (WT < 150 kg, CrCl > 30 mL/min)


*Enoxaparin/Lovenox 30 mg SQ daily (WT < 150 kg, CrCl > 10-29 mL/min)


*Enoxaparin/Lovenox 30 mg SQ BID (WT < 150 kg, CrCl > 30 mL/min)


AND


*Sequential Compression Device (SCD)











Assessment and Plan


Assessment and Plan


77 years old male with history of aortic stenosis coronary artery disease 

admitted with





Worsening short of breath and volume overload BMP 4320 mostly related to ACS, 

CHF, rule out acute coronary syndrome


History of recent hospitalization for severe symptomatic anemia in a.m.


Recent colonoscopy was negative for bleeding plan was for capsule camera


Recent cardiac evaluation by Dr. Person with a plan for heart cath once 

anemia is better


H/O anemia mostly are not deficient hemoglobin is better now at 11


History of coronary artery disease


Chronic kidney disease creatinine seems to be at baseline 1.4


Noncompliance patient admitted eating hot dog and chicken nugget


Hypertension


Hyperlipidemia


Diabetes mellitus


DVT prophylaxis





Plan:





Admit for observation


Strict MAX, cardiac telemetry


Lasix IV twice daily


Consult cardiology for AS assessment


2D echo


Cardiac enzyme cycling first set negative


Hold metformin, ISS  with Accu-Chek


New home medication for hypertension hyperlipidemia


Heparin for DVT prophylaxis, hemoglobin is at 11 the highest for his recent 

past no signs of bleeding


Monitor CBC and BMP and BNP in a.m.


Discussed Condition With


Patient in ED physician and family











Mikey Butler MD Mar 30, 2018 19:23

## 2018-03-30 NOTE — PD
HPI


.


Dyspnea


Chief Complaint:  Respiratory Distress


Time Seen by Provider:  15:03


Travel History


International Travel<30 days:  No


Contact w/Intl Traveler<30days:  No


Traveled to known affect area:  No





History of Present Illness


HPI


This patient presents to us from a doctor's office for dyspnea.  The patient is 

unable to give history because he is very distant.  History from the doctor's 

office is that he has had increasing dyspnea for the last 2 days which is 

acutely worse today.  There are no obvious modifying factors.  This patient has 

a history of aortic stenosis, coronary artery disease and profound anemia.  He 

was admitted to the hospital about a month ago with a hemoglobin of 5.5.





PFSH


Past Medical History


Cancer:  No


Cardiovascular Problems:  Yes (EDEMA, CHF, AORITC STENOSIS, AVR, CAD, MI)


Diabetes:  Yes


Patient Takes Glucophage:  No


Endocrine:  Yes


Gastrointestinal Disorders:  Yes (CKD)


Genitourinary:  No


Hypertension:  Yes


Immune Disorder:  No


Implanted Vascular Access Dvce:  No


Musculoskeletal:  No


Neurologic:  No


Psychiatric:  No


Reproductive:  No


Respiratory:  Yes (SOB)





Past Surgical History


Other Surgery:  No





Social History


Alcohol Use:  No


Tobacco Use:  No


Substance Use:  No





Allergies-Medications


(Allergen,Severity, Reaction):  


Coded Allergies:  


     No Known Allergies (Verified  Allergy, Unknown, 2/21/18)


Reported Meds & Prescriptions





Reported Meds & Active Scripts


Active


Torsemide 10 Mg Tab 10 Mg PO BID


Atorvastatin (Atorvastatin Calcium) 40 Mg Tab 40 Mg PO DAILY


Reported


Vitamin B-12 (Cyanocobalamin) 1,000 Mcg Tab 1,000 Mcg PO DAILY


Metformin (Metformin HCl) 500 Mg Tab 500 Mg PO DAILY


     With a meal


Lisinopril 5 Mg Tab 5 Mg PO DAILY


Folic Acid 0.4 Mg Tab 400 Mcg PO DAILY


Ferrous Sulfate 325 Mg (65 Mg Iron) Tablet 325 Mg PO DAILY


Colchicine 0.6 Mg Cap 0.5 Mg PO DAILY


Carvedilol 6.25 Mg Tab 6.25 Mg PO BID








Review of Systems


ROS Limitations:  Clinical Condition


Respiratory:  Positive: Shortness of Breath





Physical Exam


Narrative


GENERAL: This patient is sitting bolt upright in obvious distress.


SKIN: Cool and clammy.


HEAD: Normocephalic.  Atraumatic.


EYES: Pupils equal and round. No scleral icterus. No injection or drainage. 


ENT: No nasal bleeding or discharge.  Mucous membranes pink and moist.


NECK: Trachea midline.  Full range of motion without pain.. 


CARDIOVASCULAR: Regular rate and rhythm.  


RESPIRATORY: He has accessory muscle use and retractions.  He is tachypneic.  

He has both rales and wheezing.  Breath sounds are diminished.


MUSCULOSKELETAL: No obvious deformities. 


NEUROLOGICAL: Awake and alert. No obvious cranial nerve deficits.  Motor 

grossly within normal limits. Normal speech.


PSYCHIATRIC: Appropriate mood and affect; insight and judgment normal.





Data


Data


Last Documented VS





Vital Signs








  Date Time  Temp Pulse Resp B/P (MAP) Pulse Ox O2 Delivery O2 Flow Rate FiO2


 


3/30/18 15:43  105 24 139/79 (99) 96 Nasal Cannula 2.00 


 


3/30/18 15:01 97.8       








Orders





 Orders


Complete Blood Count With Diff (3/30/18 15:03)


Comprehensive Metabolic Panel (3/30/18 15:03)


B-Type Natriuretic Peptide (3/30/18 15:03)


D-Dimer (3/30/18 15:03)


Troponin I (3/30/18 15:03)


Iv Access Insert/Monitor (3/30/18 15:03)


Electrocardiogram (3/30/18 15:03)


Ecg Monitoring (3/30/18 15:03)


Oximetry (3/30/18 15:03)


Oxygen Administration (3/30/18 15:03)


Chest, Single Ap (3/30/18 15:03)


Sodium Chloride 0.9% Flush (Ns Flush) (3/30/18 15:15)


Furosemide Inj (Lasix Inj) (3/30/18 15:15)


Albuterol-Ipratropium Neb (Duoneb Neb) (3/30/18 15:15)


Nitroglycerin 2% Oint (Nitroglycerin 2% (3/30/18 15:15)


Admit Order (Ed Use Only) (3/30/18 )


Cardiac Monitor / Telemetry MANGO.Q8H (3/30/18 16:40)


Vital Signs (Adult) Q4H (3/30/18 16:40)


Diet Heart Healthy (3/30/18 Dinner)


Activity Oob With Assistance (3/30/18 16:40)


Notify Dr: Other (3/30/18 16:40)





Labs





Laboratory Tests








Test


  3/30/18


15:00


 


White Blood Count 7.5 TH/MM3 


 


Red Blood Count 4.03 MIL/MM3 


 


Hemoglobin 11.0 GM/DL 


 


Hematocrit 35.1 % 


 


Mean Corpuscular Volume 87.1 FL 


 


Mean Corpuscular Hemoglobin 27.2 PG 


 


Mean Corpuscular Hemoglobin


Concent 31.2 % 


 


 


Red Cell Distribution Width 24.6 % 


 


Platelet Count 215 TH/MM3 


 


Mean Platelet Volume 10.0 FL 


 


Neutrophils (%) (Auto) 80.8 % 


 


Lymphocytes (%) (Auto) 11.7 % 


 


Monocytes (%) (Auto) 5.0 % 


 


Eosinophils (%) (Auto) 1.6 % 


 


Basophils (%) (Auto) 0.9 % 


 


Neutrophils # (Auto) 6.1 TH/MM3 


 


Lymphocytes # (Auto) 0.9 TH/MM3 


 


Monocytes # (Auto) 0.4 TH/MM3 


 


Eosinophils # (Auto) 0.1 TH/MM3 


 


Basophils # (Auto) 0.1 TH/MM3 


 


CBC Comment DIFF FINAL 


 


Differential Comment  


 


D-Dimer Quantitative (PE/DVT) 0.78 MG/L FEU 


 


Blood Urea Nitrogen 17 MG/DL 


 


Creatinine 1.33 MG/DL 


 


Random Glucose 142 MG/DL 


 


Total Protein 7.6 GM/DL 


 


Albumin 4.1 GM/DL 


 


Calcium Level 8.9 MG/DL 


 


Alkaline Phosphatase 118 U/L 


 


Aspartate Amino Transf


(AST/SGOT) 27 U/L 


 


 


Alanine Aminotransferase


(ALT/SGPT) 27 U/L 


 


 


Total Bilirubin 1.0 MG/DL 


 


Sodium Level 140 MEQ/L 


 


Potassium Level 5.0 MEQ/L 


 


Chloride Level 109 MEQ/L 


 


Carbon Dioxide Level 23.2 MEQ/L 


 


Anion Gap 8 MEQ/L 


 


Estimat Glomerular Filtration


Rate 52 ML/MIN 


 


 


Troponin I 0.02 NG/ML 


 


B-Type Natriuretic Peptide 4320 PG/ML 











MDM


Medical Decision Making


Medical Screen Exam Complete:  Yes


Emergency Medical Condition:  Yes


Medical Record Reviewed:  Yes (Patient was admitted here 2/21-2/27 with a 

hemoglobin of 5.5.  He was transfused 3 units of blood.  It does not appear 

that the etiology of the anemia was discovered.)


Interpretation(s)


EKG shows sinus rhythm with a rate of 104.  He has inverted T waves laterally.  

He had this when he was admitted in February.


Differential Diagnosis


Differential diagnosis of dyspnea includes but is not limited to congestive 

heart failure, pneumonia, wheezing, pneumothorax, pulmonary embolism


Narrative Course


This patient is brought to us by [facility] from a doctor's office because of 

profound dyspnea.  Onset was 2 days ago and has been getting progressively 

worse.  [facility] treated him with a DuoNeb and Solu-Medrol.  He was still very 

short of breath on arrival here.





An IV has been started.  He has been placed on the monitor.  He has received 

duo nebs.  He has also been treated with Lasix and Nitropaste.





On recheck at 3:40 PM, the patient reported that he is breathing much better.  

His lungs now have improved air movement but diffuse expiratory wheezing and 

inspiratory rales.


Critical Care Narrative


Aggregate critical care time was 45 minutes. Time to perform other separately 

billable procedures was not  


included in the critical care time. My time did not include minutes spent 

treating any other patients simultaneously or on  


activities that did not directly contribute to the patient's treatment.  





The services I provided to this patient were to treat and/or prevent clinically 

significant deterioration due to respiratory distress





I provided critical care services requiring my management, as noted below:


Chart data review, documentation time, medication orders and management, vital 

sign assessments/reviewing monitor data,  


ordering and reviewing lab tests, ordering and interpreting/reviewing x-rays 

and diagnostic studies, care of the patient  


and discussion of the patient with the admitting physicians





Physician Communication


Physician Communication


Dr. Butler





Diagnosis





 Primary Impression:  


 CHF (congestive heart failure)


 Qualified Codes:  I50.9 - Heart failure, unspecified





Admitting Information


Admitting Physician Requests:  Admit


Condition:  Alondra Lange MD Mar 30, 2018 15:40

## 2018-03-31 VITALS
TEMPERATURE: 98.2 F | RESPIRATION RATE: 18 BRPM | SYSTOLIC BLOOD PRESSURE: 112 MMHG | DIASTOLIC BLOOD PRESSURE: 55 MMHG | HEART RATE: 78 BPM | OXYGEN SATURATION: 96 %

## 2018-03-31 VITALS
SYSTOLIC BLOOD PRESSURE: 115 MMHG | DIASTOLIC BLOOD PRESSURE: 55 MMHG | RESPIRATION RATE: 18 BRPM | HEART RATE: 74 BPM | OXYGEN SATURATION: 96 % | TEMPERATURE: 97.9 F

## 2018-03-31 VITALS
SYSTOLIC BLOOD PRESSURE: 125 MMHG | RESPIRATION RATE: 18 BRPM | DIASTOLIC BLOOD PRESSURE: 73 MMHG | TEMPERATURE: 97.8 F | HEART RATE: 85 BPM | OXYGEN SATURATION: 100 %

## 2018-03-31 VITALS
HEART RATE: 71 BPM | SYSTOLIC BLOOD PRESSURE: 99 MMHG | DIASTOLIC BLOOD PRESSURE: 54 MMHG | OXYGEN SATURATION: 96 % | TEMPERATURE: 97.8 F | RESPIRATION RATE: 14 BRPM

## 2018-03-31 VITALS
RESPIRATION RATE: 16 BRPM | SYSTOLIC BLOOD PRESSURE: 111 MMHG | OXYGEN SATURATION: 99 % | HEART RATE: 72 BPM | TEMPERATURE: 98.5 F | DIASTOLIC BLOOD PRESSURE: 55 MMHG

## 2018-03-31 VITALS
HEART RATE: 73 BPM | OXYGEN SATURATION: 99 % | RESPIRATION RATE: 14 BRPM | TEMPERATURE: 98.2 F | SYSTOLIC BLOOD PRESSURE: 125 MMHG | DIASTOLIC BLOOD PRESSURE: 64 MMHG

## 2018-03-31 VITALS — HEART RATE: 91 BPM

## 2018-03-31 VITALS — OXYGEN SATURATION: 99 %

## 2018-03-31 VITALS — HEART RATE: 78 BPM

## 2018-03-31 LAB
BASOPHILS # BLD AUTO: 0 TH/MM3 (ref 0–0.2)
BASOPHILS NFR BLD: 0.7 % (ref 0–2)
BUN SERPL-MCNC: 19 MG/DL (ref 7–18)
CALCIUM SERPL-MCNC: 8.5 MG/DL (ref 8.5–10.1)
CHLORIDE SERPL-SCNC: 108 MEQ/L (ref 98–107)
CREAT SERPL-MCNC: 1.36 MG/DL (ref 0.6–1.3)
DACRYOCYTES BLD QL SMEAR: (no result)
EOSINOPHIL # BLD: 0 TH/MM3 (ref 0–0.4)
EOSINOPHIL NFR BLD: 0.3 % (ref 0–4)
ERYTHROCYTE [DISTWIDTH] IN BLOOD BY AUTOMATED COUNT: 23.9 % (ref 11.6–17.2)
GFR SERPLBLD BASED ON 1.73 SQ M-ARVRAT: 51 ML/MIN (ref 89–?)
GLUCOSE SERPL-MCNC: 99 MG/DL (ref 74–106)
HCO3 BLD-SCNC: 26.6 MEQ/L (ref 21–32)
HCT VFR BLD CALC: 28.7 % (ref 39–51)
HGB BLD-MCNC: 9.2 GM/DL (ref 13–17)
LYMPHOCYTES # BLD AUTO: 0.6 TH/MM3 (ref 1–4.8)
LYMPHOCYTES NFR BLD AUTO: 10.5 % (ref 9–44)
MAGNESIUM SERPL-MCNC: 2 MG/DL (ref 1.5–2.5)
MCH RBC QN AUTO: 27.4 PG (ref 27–34)
MCHC RBC AUTO-ENTMCNC: 32 % (ref 32–36)
MCV RBC AUTO: 85.6 FL (ref 80–100)
MONOCYTE #: 0.4 TH/MM3 (ref 0–0.9)
MONOCYTES NFR BLD: 6.3 % (ref 0–8)
NEUTROPHILS # BLD AUTO: 5.1 TH/MM3 (ref 1.8–7.7)
NEUTROPHILS NFR BLD AUTO: 82.2 % (ref 16–70)
OVALOCYTES BLD QL SMEAR: (no result)
PHOSPHATE SERPL-MCNC: 3.6 MG/DL (ref 2.5–4.9)
PLATELET # BLD: 178 TH/MM3 (ref 150–450)
PMV BLD AUTO: 9.5 FL (ref 7–11)
RBC # BLD AUTO: 3.35 MIL/MM3 (ref 4.5–5.9)
SODIUM SERPL-SCNC: 141 MEQ/L (ref 136–145)
TROPONIN I SERPL-MCNC: 0.05 NG/ML (ref 0.02–0.05)
WBC # BLD AUTO: 6.2 TH/MM3 (ref 4–11)

## 2018-03-31 RX ADMIN — ATORVASTATIN CALCIUM SCH MG: 40 TABLET, FILM COATED ORAL at 10:00

## 2018-03-31 RX ADMIN — CARVEDILOL SCH MG: 3.12 TABLET, FILM COATED ORAL at 10:00

## 2018-03-31 RX ADMIN — FUROSEMIDE SCH MG: 10 INJECTION, SOLUTION INTRAMUSCULAR; INTRAVENOUS at 06:23

## 2018-03-31 RX ADMIN — INSULIN ASPART SCH: 100 INJECTION, SOLUTION INTRAVENOUS; SUBCUTANEOUS at 21:00

## 2018-03-31 RX ADMIN — FERROUS SULFATE TAB 325 MG (65 MG ELEMENTAL FE) SCH MG: 325 (65 FE) TAB at 10:00

## 2018-03-31 RX ADMIN — HEPARIN SODIUM SCH UNITS: 10000 INJECTION, SOLUTION INTRAVENOUS; SUBCUTANEOUS at 19:11

## 2018-03-31 RX ADMIN — STANDARDIZED SENNA CONCENTRATE AND DOCUSATE SODIUM SCH TAB: 8.6; 5 TABLET, FILM COATED ORAL at 10:00

## 2018-03-31 RX ADMIN — CARVEDILOL SCH MG: 3.12 TABLET, FILM COATED ORAL at 20:45

## 2018-03-31 RX ADMIN — STANDARDIZED SENNA CONCENTRATE AND DOCUSATE SODIUM SCH TAB: 8.6; 5 TABLET, FILM COATED ORAL at 20:44

## 2018-03-31 RX ADMIN — HEPARIN SODIUM SCH UNITS: 10000 INJECTION, SOLUTION INTRAVENOUS; SUBCUTANEOUS at 09:30

## 2018-03-31 RX ADMIN — FUROSEMIDE SCH MG: 10 INJECTION, SOLUTION INTRAMUSCULAR; INTRAVENOUS at 19:11

## 2018-03-31 RX ADMIN — CYANOCOBALAMIN TAB 1000 MCG SCH MCG: 1000 TAB at 10:00

## 2018-03-31 RX ADMIN — HEPARIN SODIUM SCH UNITS: 10000 INJECTION, SOLUTION INTRAVENOUS; SUBCUTANEOUS at 02:18

## 2018-03-31 RX ADMIN — INSULIN ASPART SCH: 100 INJECTION, SOLUTION INTRAVENOUS; SUBCUTANEOUS at 17:00

## 2018-03-31 NOTE — HHI.PR
Subjective


Remarks


Follow-up on patient with CHF exacerbation.  Patient seen and examined.  

Patient states he feels much better.  His breathing has improved.  He has less 

lower extremity swelling.  He denies any complaints of cough or chest pain.  He 

does not use oxygen at home.  He denies any nausea, vomiting or abdominal pain.

  He denies any hematuria or dysuria.  He denies any complaints of diarrhea, 

black/tarry/bloody stools.  He was previously on torsemide as an outpatient but 

states he was taken off 1 week ago, he is not sure why.





Objective


Vitals





Vital Signs








  Date Time  Temp Pulse Resp B/P (MAP) Pulse Ox O2 Delivery O2 Flow Rate FiO2


 


3/31/18 06:10  78      


 


3/31/18 04:10 97.9 74 18 115/55 (75) 96   


 


3/31/18 01:08 98.2 78 18 112/55 (74) 96   


 


3/30/18 20:13 97.9 100 18 131/75 (93) 96   


 


3/30/18 18:48  88 17 128/68 (88) 96 Nasal Cannula 2.00 


 


3/30/18 15:43  105 24 139/79 (99) 96 Nasal Cannula 2.00 


 


3/30/18 15:17     92 Room Air  


 


3/30/18 15:17     96 Nasal Cannula 2.00 


 


3/30/18 15:17     96 Nasal Cannula 2.00 


 


3/30/18 15:01 97.8 108 24 211/94 (133) 92   














I/O      


 


 3/30/18 3/30/18 3/30/18 3/31/18 3/31/18 3/31/18





 07:00 15:00 23:00 07:00 15:00 23:00


 


Output Total   850 ml 850 ml  


 


Balance   -850 ml -850 ml  


 


      


 


Output Urine Total   850 ml 850 ml  


 


# Voids   4   








Result Diagram:  


3/31/18 0334                                                                   

             3/31/18 0334





Imaging





Last Impressions








Chest X-Ray 3/30/18 1503 Signed





Impressions: 





 Service Date/Time:  Friday, March 30, 2018 15:32 - CONCLUSION:  1. Right 

basilar 





 consolidation with possible small effusion. 2. Mild atelectatic changes of the 





 left hemidiaphragm. 3. Slight interstitial prominence which could represent 

some 





 degree of vascular congestion or volume overload. Heart size is borderline 





 prominent.     Gal Mayfield MD 








Objective Remarks


GENERAL: This is a well-nourished, well-developed elderly  male patient

, in no apparent distress.  Awake and alert.  Pleasant and calm.


SKIN: No rashes, warm and dry 


HEAD: Atraumatic. Normocephalic. 


EYES: Extraocular motions intact. No scleral icterus. 


ENT: Nose without bleeding, or drainage, Airway patent.  MMM.


NECK: Trachea midline.  Supple


CARDIOVASCULAR: Regular rate and rhythm 3 out of 6 systolic murmur


RESPIRATORY: Clear to auscultation bilaterally.  Nonlabored.


GASTROINTESTINAL: Abdomen soft, non-tender, nondistended.  Positive bowel sounds


MUSCULOSKELETAL: Extremities without clubbing or cyanosis.  Trace BLE edema 

noted.


NEUROLOGICAL: Awake and alert.  Able to move all extremities spontaneously.  

Motor and sensory function grossly intact.  Nonfocal.  Normal speech.


PSYCHIATRIC:  Appropriate mood and affect.  Normal judgment and insight.


Medications and IVs





Current Medications








 Medications


  (Trade)  Dose


 Ordered  Sig/Ramu


 Route  Start Time


 Stop Time Status Last Admin


 


  (NS Flush)  2 ml  UNSCH  PRN


 IVF  3/30/18 15:15


     


 


 


  (Zofran Inj)  4 mg  Q6H  PRN


 IVP  3/30/18 17:30


     


 


 


  (Heparin Inj)  5,000 units  Q8H


 SQ  3/30/18 17:30


    3/31/18 02:18


 


 


  (Norco  5-325 Mg)  1 tab  Q4H  PRN


 PO  3/30/18 17:30


     


 


 


  (Norco  7.5-325


 Mg)  1 tab  Q4H  PRN


 PO  3/30/18 17:30


     


 


 


  (Morphine Inj)  4 mg  Q3H  PRN


 IV PUSH  3/30/18 17:30


     


 


 


  (Narcan Inj)  0.4 mg  UNSCH  PRN


 IV PUSH  3/30/18 17:30


     


 


 


  (Arielle-Colace)  1 tab  BID


 PO  3/30/18 21:00


    3/30/18 22:06


 


 


  (Milk Of


 Magnesia Liq)  30 ml  Q12H  PRN


 PO  3/30/18 17:30


     


 


 


  (Senokot)  17.2 mg  Q12H  PRN


 PO  3/30/18 17:30


     


 


 


  (Lasix Inj)  40 mg  DAILY@0700,1900


 IV PUSH  3/31/18 07:00


    3/31/18 06:23


 











A/P


Assessment and Plan


77-year-old male with hypertension, dyslipidemia, aortic stenosis, CHF, CAD, CKD

, gout and diabetes admitted with three-day history of worsening shortness of 

breath and bilateral lower extremity edema.





CHF, decompensated


Last echo 2/11/18 with EF 45-50%, mild LVH, severe aortic stenosis


   -CXR shows right basilar consolidation/effusion with some atelectatic 

changes in the left base


   -BNP 4996


   -Continue on IV Lasix for diuresis


   -Monitor I&Os


   -Fluid/salt restricted diet


   -CHF teaching


   -Continue on Coreg


   -continue to monitor volume status


   -Continue supplemental oxygen to maintain O2 sats above 92%





CAD, ?previous stenting


Severe aortic stenosis


   -Patient has no complaints of chest pain


   -Cardiology following, appreciate assistance.  Repeat echocardiogram 

ordered.  Possible heart catheterization on Monday, followed by referral for 

aortic valve replacement surgery   


   -monitor





Hypertension, chronic, soft BP


   -Continue on Coreg but at decreased dose with holding parameters


   -Lisinopril discontinued


   -continue to monitor BP and adjust accordingly





Suspected  CKD stage III


   -baseline creatinine likely around 1.3-1.4


   -avoid nephrotoxic agents


   -continue to monitor renal indices





Diabetes


   -Continue to hold metformin


   -Accu-Cheks and insulin sliding scale


   -Obtain hemoglobin A1c





Iron deficiency anemia, secondary to GIB


   -Resume home dose of iron daily


   -Hemoglobin was slight trend down, possibly dilutional, no active bleeding 

identified.  Will check stool Hemoccult.


   -Continue to monitor CBC, repeat in am





History of recent GI bleed requiring transfusion 3 units PRBCs


   -s/p EGD/colonoscopy 2/24/18, irregular z line, mild gastritis, multiple 

polyps


   -no significant source of bleeding identified





Gout


   -no exacerbation


   -monitor





DVT prophylaxis


   -Bilateral SCD/ANGELINE hose


Discharge Planning


Discharge pending clinical workup and cardiology clearance











Margret Qureshi Mar 31, 2018 08:25

## 2018-03-31 NOTE — EKG
Date Performed: 2018       Time Performed: 15:14:54

 

PTAGE:      77 years

 

EKG:      SINUS TACHYCARDIA ST DEVIATION AND MODERATE T-WAVE ABNORMALITY, CONSIDER ANTEROLATERAL ISCH
EMIA ST DEVIATION AND MODERATE T-WAVE ABNORMALITY, CONSIDER INFERIOR ISCHEMIA ABNORMAL ECG Since 

 

 PREVIOUS TRACING            , no significant change noted PREVIOUS TRACIN2018 09.35

 

DOCTOR:   Terence Nickerson  Interpretating Date/Time  2018 14:48:31

## 2018-03-31 NOTE — MB
cc:

Jamey East MD

****

 

 

DATE:

03/31/2018

 

REASON FOR CONSULTATION:

Evaluation of aortic stenosis.

 

HISTORY OF PRESENT ILLNESS:

Tang Cho is a 77-year-old man who sees my colleague, Dr. Person in the office.  The patient has been diagnosed with aortic 

stenosis.  The patient was recently coming in for a heart 

catheterization, but was found to be profoundly anemic and the heart 

catheterization was canceled and postponed until he improved.  His 

last echo was 02/11/2018.  He had mild LVH.  EF was in the 45-50% 

range with hypokinesis of the basal and mid inferior wall.  Aortic 

valve had a 60 mm mean and 106 mm peak gradient with a valve area of 

0.44 cm2.  These findings were consistent with severe aortic stenosis.

 When the patient was just brought in for a heart catheterization, his

hemoglobin was 5.6 with hematocrit 18.9.  He was transfused.  He had a

workup including endoscopy and colonoscopy, which did not reveal a 

source of bleeding.  He saw hematology who recommended restarting the 

patient back on iron and that we do followup.  The patient states that

yesterday he got profoundly short of breath.  He tells me he went off 

his diuretic after last admission and he developed lower extremity 

edema and significant shortness of breath.  He has received some IV 

Lasix yesterday and his shortness of breath is much better today.  

Denies any chest pain at this time.

 

PAST MEDICAL HISTORY:

Includes CHF, aortic stenosis, coronary artery disease.  He had a 

catheterization maybe 10 years ago in [place].  He is not aware of 

the findings.  It is not clear if he had a stent done or not.  He has 

had hypertension.

 

PAST SURGICAL HISTORY:

Includes cataract surgery.

 

SOCIAL HISTORY:

He quit smoking in 1950.  He is  with 1 child.

 

ALLERGIES:

NONE KNOWN.

 

FAMILY HISTORY:

Positive for CVA in a brother.

 

PHYSICAL EXAMINATION:

GENERAL:  Shows a well-developed, well-nourished man, in no acute 

distress.

VITAL SIGNS:  Charted.

HEENT:  Unremarkable.

NECK:  Shows no bruits.  No JVD.

CHEST:  Clear to auscultation.  It was not clear yesterday.

HEART:  S1, S2.  Regular rate and rhythm.  There is a grade II-III/VI 

aortic stenosis type murmur.  The murmur does not sound as severe as 

what I would have expected based on the echo.

ABDOMEN:  Soft.

EXTREMITIES:  Reveal intact pulses.  No edema.

 

DIAGNOSTIC STUDIES:

His EKG demonstrates normal sinus rhythm, lateral ST-T wave 

abnormality which may be an LV strain pattern from left ventricular 

hypertrophy.

 

LABORATORY DATA:

BUN is 19, creatinine 1.36, hematocrit has dropped from 35.1 to 28.7, 

platelet count is normal.

 

IMAGING STUDIES:

Chest x-ray showing suggestion of right basilar consolidation and 

slight interstitial prominence.

 

IMPRESSION:

A complicated 77-year-old man with anemia, suspected coronary disease 

as well as aortic stenosis suspected to be severe and acute 

decompensation episode yesterday which has improved.  It looks like 

the major thing that improved was giving him diuretics as he had some 

fluid retention, probably had congestive heart failure.

 

PLAN:

I am going to recheck labs tomorrow morning.  I am going reassess his 

valve with an echo just to make sure the gradient that was previously 

reported continues to hold up.  He will likely need a heart 

catheterization on Monday.  Following that, probably referral for 

valve replacement, either TAVR or open surgery depending on the 

findings on the catheterization.  Further therapy to be determined.

 

 

__________________________________

MD VASYL Hastings/AYDEN

D: 03/31/2018, 09:58 AM

T: 03/31/2018, 10:18 AM

Visit #: [id number]

Job #: [id number]

## 2018-04-01 VITALS
SYSTOLIC BLOOD PRESSURE: 126 MMHG | OXYGEN SATURATION: 99 % | RESPIRATION RATE: 17 BRPM | HEART RATE: 70 BPM | TEMPERATURE: 98.6 F | DIASTOLIC BLOOD PRESSURE: 61 MMHG

## 2018-04-01 VITALS
SYSTOLIC BLOOD PRESSURE: 122 MMHG | RESPIRATION RATE: 16 BRPM | DIASTOLIC BLOOD PRESSURE: 64 MMHG | HEART RATE: 67 BPM | TEMPERATURE: 97.8 F | OXYGEN SATURATION: 97 %

## 2018-04-01 VITALS
HEART RATE: 75 BPM | TEMPERATURE: 97.8 F | DIASTOLIC BLOOD PRESSURE: 76 MMHG | SYSTOLIC BLOOD PRESSURE: 127 MMHG | RESPIRATION RATE: 16 BRPM | OXYGEN SATURATION: 94 %

## 2018-04-01 VITALS
TEMPERATURE: 98 F | RESPIRATION RATE: 14 BRPM | HEART RATE: 75 BPM | SYSTOLIC BLOOD PRESSURE: 129 MMHG | OXYGEN SATURATION: 100 % | DIASTOLIC BLOOD PRESSURE: 66 MMHG

## 2018-04-01 VITALS — OXYGEN SATURATION: 97 %

## 2018-04-01 VITALS — HEART RATE: 68 BPM

## 2018-04-01 VITALS
OXYGEN SATURATION: 95 % | RESPIRATION RATE: 16 BRPM | HEART RATE: 71 BPM | SYSTOLIC BLOOD PRESSURE: 130 MMHG | DIASTOLIC BLOOD PRESSURE: 76 MMHG

## 2018-04-01 VITALS — HEART RATE: 76 BPM

## 2018-04-01 VITALS — HEART RATE: 78 BPM

## 2018-04-01 VITALS — HEART RATE: 65 BPM

## 2018-04-01 VITALS
TEMPERATURE: 98.7 F | SYSTOLIC BLOOD PRESSURE: 122 MMHG | OXYGEN SATURATION: 96 % | DIASTOLIC BLOOD PRESSURE: 83 MMHG | RESPIRATION RATE: 16 BRPM | HEART RATE: 79 BPM

## 2018-04-01 VITALS — HEART RATE: 64 BPM

## 2018-04-01 VITALS — HEART RATE: 84 BPM

## 2018-04-01 VITALS — HEART RATE: 75 BPM

## 2018-04-01 VITALS — HEART RATE: 71 BPM

## 2018-04-01 VITALS — HEART RATE: 70 BPM

## 2018-04-01 VITALS — HEART RATE: 62 BPM

## 2018-04-01 VITALS — HEART RATE: 66 BPM

## 2018-04-01 LAB
ALBUMIN SERPL-MCNC: 3.5 GM/DL (ref 3.4–5)
ALP SERPL-CCNC: 94 U/L (ref 45–117)
ALT SERPL-CCNC: 19 U/L (ref 12–78)
AST SERPL-CCNC: 18 U/L (ref 15–37)
BASOPHILS # BLD AUTO: 0 TH/MM3 (ref 0–0.2)
BASOPHILS NFR BLD: 0.7 % (ref 0–2)
BILIRUB SERPL-MCNC: 0.7 MG/DL (ref 0.2–1)
BUN SERPL-MCNC: 21 MG/DL (ref 7–18)
CALCIUM SERPL-MCNC: 9 MG/DL (ref 8.5–10.1)
CHLORIDE SERPL-SCNC: 103 MEQ/L (ref 98–107)
CREAT SERPL-MCNC: 1.46 MG/DL (ref 0.6–1.3)
EOSINOPHIL # BLD: 0.2 TH/MM3 (ref 0–0.4)
EOSINOPHIL NFR BLD: 3 % (ref 0–4)
ERYTHROCYTE [DISTWIDTH] IN BLOOD BY AUTOMATED COUNT: 24.1 % (ref 11.6–17.2)
GFR SERPLBLD BASED ON 1.73 SQ M-ARVRAT: 47 ML/MIN (ref 89–?)
GLUCOSE SERPL-MCNC: 92 MG/DL (ref 74–106)
HCO3 BLD-SCNC: 27.9 MEQ/L (ref 21–32)
HCT VFR BLD CALC: 28.4 % (ref 39–51)
HCT VFR BLD CALC: 30 % (ref 39–51)
HGB BLD-MCNC: 9.3 GM/DL (ref 13–17)
HGB BLD-MCNC: 9.6 GM/DL (ref 13–17)
LYMPHOCYTES # BLD AUTO: 0.8 TH/MM3 (ref 1–4.8)
LYMPHOCYTES NFR BLD AUTO: 12.8 % (ref 9–44)
MCH RBC QN AUTO: 27.6 PG (ref 27–34)
MCHC RBC AUTO-ENTMCNC: 32.6 % (ref 32–36)
MCV RBC AUTO: 84.8 FL (ref 80–100)
MONOCYTE #: 0.4 TH/MM3 (ref 0–0.9)
MONOCYTES NFR BLD: 7.6 % (ref 0–8)
NEUTROPHILS # BLD AUTO: 4.5 TH/MM3 (ref 1.8–7.7)
NEUTROPHILS NFR BLD AUTO: 75.9 % (ref 16–70)
PLATELET # BLD: 164 TH/MM3 (ref 150–450)
PMV BLD AUTO: 9.9 FL (ref 7–11)
PROT SERPL-MCNC: 6.5 GM/DL (ref 6.4–8.2)
RBC # BLD AUTO: 3.35 MIL/MM3 (ref 4.5–5.9)
SODIUM SERPL-SCNC: 140 MEQ/L (ref 136–145)
WBC # BLD AUTO: 5.9 TH/MM3 (ref 4–11)

## 2018-04-01 PROCEDURE — B2111ZZ FLUOROSCOPY OF MULTIPLE CORONARY ARTERIES USING LOW OSMOLAR CONTRAST: ICD-10-PCS | Performed by: INTERNAL MEDICINE

## 2018-04-01 PROCEDURE — 4A023N6 MEASUREMENT OF CARDIAC SAMPLING AND PRESSURE, RIGHT HEART, PERCUTANEOUS APPROACH: ICD-10-PCS | Performed by: INTERNAL MEDICINE

## 2018-04-01 PROCEDURE — B41F1ZZ FLUOROSCOPY OF RIGHT LOWER EXTREMITY ARTERIES USING LOW OSMOLAR CONTRAST: ICD-10-PCS | Performed by: INTERNAL MEDICINE

## 2018-04-01 RX ADMIN — CARVEDILOL SCH MG: 3.12 TABLET, FILM COATED ORAL at 08:57

## 2018-04-01 RX ADMIN — INSULIN ASPART SCH: 100 INJECTION, SOLUTION INTRAVENOUS; SUBCUTANEOUS at 17:00

## 2018-04-01 RX ADMIN — PANTOPRAZOLE SODIUM SCH MG: 40 INJECTION, POWDER, FOR SOLUTION INTRAVENOUS at 14:52

## 2018-04-01 RX ADMIN — FUROSEMIDE SCH MG: 10 INJECTION, SOLUTION INTRAMUSCULAR; INTRAVENOUS at 18:48

## 2018-04-01 RX ADMIN — INSULIN ASPART SCH: 100 INJECTION, SOLUTION INTRAVENOUS; SUBCUTANEOUS at 12:23

## 2018-04-01 RX ADMIN — FERROUS SULFATE TAB 325 MG (65 MG ELEMENTAL FE) SCH MG: 325 (65 FE) TAB at 08:56

## 2018-04-01 RX ADMIN — STANDARDIZED SENNA CONCENTRATE AND DOCUSATE SODIUM SCH TAB: 8.6; 5 TABLET, FILM COATED ORAL at 21:01

## 2018-04-01 RX ADMIN — STANDARDIZED SENNA CONCENTRATE AND DOCUSATE SODIUM SCH TAB: 8.6; 5 TABLET, FILM COATED ORAL at 08:57

## 2018-04-01 RX ADMIN — CARVEDILOL SCH MG: 3.12 TABLET, FILM COATED ORAL at 21:01

## 2018-04-01 RX ADMIN — PANTOPRAZOLE SODIUM SCH MG: 40 INJECTION, POWDER, FOR SOLUTION INTRAVENOUS at 03:15

## 2018-04-01 RX ADMIN — ATORVASTATIN CALCIUM SCH MG: 40 TABLET, FILM COATED ORAL at 08:57

## 2018-04-01 RX ADMIN — INSULIN ASPART SCH: 100 INJECTION, SOLUTION INTRAVENOUS; SUBCUTANEOUS at 21:00

## 2018-04-01 RX ADMIN — Medication SCH ML: at 21:01

## 2018-04-01 RX ADMIN — CYANOCOBALAMIN TAB 1000 MCG SCH MCG: 1000 TAB at 08:56

## 2018-04-01 RX ADMIN — INSULIN ASPART SCH: 100 INJECTION, SOLUTION INTRAVENOUS; SUBCUTANEOUS at 08:00

## 2018-04-01 RX ADMIN — FUROSEMIDE SCH MG: 10 INJECTION, SOLUTION INTRAMUSCULAR; INTRAVENOUS at 06:45

## 2018-04-01 RX ADMIN — HEPARIN SODIUM SCH UNITS: 10000 INJECTION, SOLUTION INTRAVENOUS; SUBCUTANEOUS at 01:36

## 2018-04-01 NOTE — MA
cc:

Jamey East MD

****

 

 

DATE:

04/01/2018

 

PROCEDURES PERFORMED:

Right heart catheterization, coronary angiography, right femoral 

angiography with Angio-Seal placement.

 

BRIEF HISTORY:

Tang Cho is a 77-year-old male with symptomatic aortic stenosis. 

He had an echo performed at [facility] and an echo performed 

in the hospital, each of which have shown a mean gradient in excess of

50 mmHg.

 

DESCRIPTION OF PROCEDURE:

The patient was brought to the cardiac catheterization lab in a 

fasting state.  The right groin was prepped and draped in sterile 

fashion.  Using 1% lidocaine for local anesthesia and a single stick 

for each, 6-Sloven[place] sheath was inserted in the right femoral artery and

right femoral vein.  Right heart catheterization was then performed in

standard fashion using a [place]ot[place]-Silver catheter. Coronary angiography was 

then completed using left 5 and 3DRC catheters.  Angiography was then 

obtained of the right femoral artery via the sheath, followed by 

uncomplicated Angio-Seal closure.  There were no complications.

 

CONTRAST:

50 mL

 

BLOOD LOSS:

5 mL

 

FINDINGS:

1.  Hemodynamics:  Right atrial pressure was 19/15 with a mean of 13. 

Right ventricular pressure 46/10 with an end diastolic pressure of 13.

 Pulmonary artery pressure 45/19 with a mean of 33.  Pulmonary 

capillary wedge pressure 34/34 with a mean of 26. Aortic pressure 

100/48 with a mean of 68.  Saturations obtained in the pulmonary 

artery was 51.5%.  Saturations in the aorta were 83%.

2.  Coronary angiography:  Coronary circulation was left dominant.  

The right coronary artery is totally occluded proximally with left to 

right collaterals.  Left coronary artery demonstrates irregularities 

throughout. The distal circumflex in 1 view after 2 major marginal 

branches may have as much as a 50% focal stenosis, but had normal ERVIN

3 flow.

 

CONCLUSIONS:

1.  The patient is known to have severe aortic stenosis.

2.  Total occlusion of a nondominant right coronary artery that is 

collateralized and a 50% stenosis of the distal circumflex.  This 

could be managed medically if TAVR would be better option for him.

 

RECOMMENDATIONS:

Consider TAVR versus AVR.  We will consult Dr. Baxter.

 

 

__________________________________

Jamey East MD

 

 

VEW/TL

D: 04/01/2018, 10:42 AM

T: 04/01/2018, 11:05 AM

Visit #: [id number]

Job #: [id number]

## 2018-04-01 NOTE — ECHRPT
Indication:   Shortness of breath

 

 CONCLUSIONS

 

 Moderately dilated left ventricle. 

 Wall thickness is measured at the upper limits of normal. 

 The left ventricular systolic function is severely reduced with an estimated ejection fraction in th
e range of 

 30-35%. 

 There is diffuse global hypokinesis with distinct regional wall motion abnormalities. 

 Mild-to-moderate mitral valve regurgitation. 

 Mild aortic valve regurgitation. 

 Severe aortic valve stenosis. 

 Aortic valve area is 0.48 cm. 

 Aortic valve mean gradient is 53.3 mmHg. 

 The pulmonary valve is not well visualized.  

 

 BP:        /         HR:                          Rhythm:           Sinus

 

 MEASUREMENTS  (Male / Female) Normal Values       Technical Quality:Fair

 2D ECHO

 LV Diastolic Diameter PLAX        6.3 cm                4.2 - 5.9 / 3.9 - 5.3 cm

 LV Systolic Diameter PLAX         4.8 cm                

 IVS Diastolic Thickness           1.3 cm                0.6 - 1.0 / 0.6 - 0.9 cm

 LVPW Diastolic Thickness          1.3 cm                0.6 - 1.0 / 0.6 - 0.9 cm

 LV Relative Wall Thickness        0.4                   

 LVOT Diameter                     2.3 cm                

 

 M-MODE

 Aortic Root Diameter MM           3.2 cm                

 LA Systolic Diameter MM           3.6 cm                

 LA Ao Ratio MM                    1.1                   

 AV Cusp Separation MM             1.3 cm                

 

 DOPPLER

 AV Peak Velocity                  442.2 cm/s            

 AV Peak Gradient                  78.2 mmHg             

 AV Mean Gradient                  53.3 mmHg             

 AV Velocity Time Integral         119.7 cm              

 AI Peak Velocity                  336.5 cm/s            

 AI Peak Gradient                  45.3 mmHg             

 AI Pressure Half Time             444.0 ms              

 LVOT Peak Velocity                51.3 cm/s             

 LVOT Peak Gradient                1.1 mmHg              

 AV Area Cont Eq pk                0.5 cm               

 MR Peak Velocity                  575.5 cm/s            

 MR Peak Gradient                  132.5 mmHg            

 Mitral E Point Velocity           119.0 cm/s            

 Mitral A Point Velocity           78.0 cm/s             

 Mitral E to A Ratio               1.5                   

 LV E' Lateral Velocity            4.9 cm/s              

 Mitral E to LV E' Lateral Ratio   24.4                  

 LV E' Septal Velocity             2.6 cm/s              

 Mitral E to LV E' Septal Ratio    45.2                  

 PV Peak Velocity                  137.0 cm/s            

 PV Peak Gradient                  7.5 mmHg              

 

 

 FINDINGS

 

 LEFT VENTRICLE

 Moderately dilated left ventricle. 

 Wall thickness is measured at the upper limits of normal. 

 The left ventricular systolic function is severely reduced with an estimated ejection fraction in th
e range of 

 30-35%. 

 There is diffuse global hypokinesis with distinct regional wall motion abnormalities. 

 

 RIGHT VENTRICLE

 Normal right ventricular size and systolic function.  

 

 LEFT ATRIUM

 The left atrial size is normal.  

 

 RIGHT ATRIUM

 The right atrial size is normal.  

 

 ATRIAL SEPTUM

 Normal atrial septal thickness without atrial level shunting by limited color doppler interrogation.
  

 

 AORTA

 The aortic root and proximal ascending aorta are normal in size on limited imaging.  

 

 MITRAL VALVE

 Mild-to-moderate mitral valve regurgitation. 

 

 AORTIC VALVE

 Mild aortic valve regurgitation. 

 Severe aortic valve stenosis. 

 Aortic valve area is 0.48 cm. 

 Aortic valve mean gradient is 53.3 mmHg. 

 

 TRICUSPID VALVE

 Structurally normal tricuspid valve. No tricuspid valve stenosis or regurgitation.  

 

 PULMONARY VALVE

 The pulmonary valve is not well visualized.  

 

 VESSELS

 The inferior vena cava is normal in size.  

 

 PERICARDIUM

 No pericardial effusion.  

 

 

 

 

  Lázaro Salazar MD, FACC

  (Electronically Signed)

  Final Date:01 April 2018 13:25

## 2018-04-01 NOTE — PD.CONS
HPI


History of Present Illness


This is a 77 year old male who presented with SOB.  He is s/p catheterization, 

coronary angiography, right femoral angiography done today.  GI has been 

consulted for GIB and heme pos stool.  Pt admits dark stools and says he takes 

iron. Pt denies any adri blood in stool.  Denies abd pain, n/v.  He had EGD 

and colonoscopy 2/24/18 on a recent admission with finding poss jean's but 

path benign, gastritis; colon polyps ablated and 1 removed by snare, path 

adenomatous.  He did follow up at [facility] as directed and says he was told to have 

capsule endoscopy and this is currently being scheduled.  He denies use blood 

thinners, NSAIDs. 


 (Rupal Bauer)





PFSH


Past Medical History


CHF


Aortic stenosis


AVR


CAD


CKD


Hypertension


Past Surgical History


Heart cath and colonoscopy


 (Rupal Bauer)


Coded Allergies:  


     No Known Allergies (Verified  Allergy, Unknown, 2/21/18)


Family History


Review with the patient,not aware of significant medical history related to her 

problem runs in the family


Social History


Denied tobacco alcohol or illicit drug abuse


 (Rupal Bauer)





Review of Systems


Constitutional:  DENIES: Fever


Endocrine:  DENIES: Polydipsia


Eyes:  DENIES: Blurred vision


Ears, nose, mouth, throat:  DENIES: Hearing loss


Respiratory:  COMPLAINS OF: Shortness of breath, DENIES: Cough


Cardiovascular:  DENIES: Chest pain


Gastrointestinal:  COMPLAINS OF: Black stools, DENIES: Abdominal pain, Bloody 

stools, Nausea, Vomiting, Hematemesis


Genitourinary:  DENIES: Hematuria


Musculoskeletal:  DENIES: Muscle aches, Joint Swelling


Integumentary:  DENIES: Abnormal pigmentation


Hematologic/lymphatic:  DENIES: Bruising


Immunologic/allergic:  DENIES: Eczema


Neurologic:  DENIES: Abnormal gait


Psychiatric:  DENIES: Confusion (Rupal Bauer)





GI Exam


Vitals I&O





Vital Signs








  Date Time  Temp Pulse Resp B/P (MAP) Pulse Ox O2 Delivery O2 Flow Rate FiO2


 


4/1/18 12:01  65      


 


4/1/18 11:05 97.8 67 16 122/64 (83) 97   


 


4/1/18 11:00  71      


 


4/1/18 08:20  66      


 


4/1/18 07:42 98.0 75 14 129/66 (87) 100   


 


4/1/18 04:32  65      


 


4/1/18 03:43 98.6 70 17 126/61 (82) 99   


 


4/1/18 00:30  64      


 


3/31/18 23:57 98.5 72 16 111/55 (73) 99   


 


3/31/18 20:41 97.8 85 18 125/73 (90) 100   


 


3/31/18 20:24     99 Nasal Cannula 2.00 


 


3/31/18 20:20  91      














I/O      


 


 3/31/18 3/31/18 3/31/18 4/1/18 4/1/18 4/1/18





 07:00 15:00 23:00 07:00 15:00 23:00


 


Intake Total    20 ml  


 


Output Total 850 ml   1860 ml  


 


Balance -850 ml   -1840 ml  


 


      


 


Intake Oral    20 ml  


 


Output Urine Total 850 ml   1860 ml  








Imaging





Last Impressions








Chest X-Ray 3/30/18 1503 Signed





Impressions: 





 Service Date/Time:  Friday, March 30, 2018 15:32 - CONCLUSION:  1. Right 

basilar 





 consolidation with possible small effusion. 2. Mild atelectatic changes of the 





 left hemidiaphragm. 3. Slight interstitial prominence which could represent 

some 





 degree of vascular congestion or volume overload. Heart size is borderline 





 prominent.     Gal Mayfield MD 








Laboratory











Test


  4/1/18


07:42


 


White Blood Count 5.9 TH/MM3 


 


Red Blood Count 3.35 MIL/MM3 


 


Hemoglobin 9.3 GM/DL 


 


Hematocrit 28.4 % 


 


Mean Corpuscular Volume 84.8 FL 


 


Mean Corpuscular Hemoglobin 27.6 PG 


 


Mean Corpuscular Hemoglobin


Concent 32.6 % 


 


 


Red Cell Distribution Width 24.1 % 


 


Platelet Count 164 TH/MM3 


 


Mean Platelet Volume 9.9 FL 


 


Neutrophils (%) (Auto) 75.9 % 


 


Lymphocytes (%) (Auto) 12.8 % 


 


Monocytes (%) (Auto) 7.6 % 


 


Eosinophils (%) (Auto) 3.0 % 


 


Basophils (%) (Auto) 0.7 % 


 


Neutrophils # (Auto) 4.5 TH/MM3 


 


Lymphocytes # (Auto) 0.8 TH/MM3 


 


Monocytes # (Auto) 0.4 TH/MM3 


 


Eosinophils # (Auto) 0.2 TH/MM3 


 


Basophils # (Auto) 0.0 TH/MM3 


 


CBC Comment DIFF FINAL 


 


Differential Comment  


 


Blood Urea Nitrogen 21 MG/DL 


 


Creatinine 1.46 MG/DL 


 


Random Glucose 92 MG/DL 


 


Total Protein 6.5 GM/DL 


 


Albumin 3.5 GM/DL 


 


Calcium Level 9.0 MG/DL 


 


Alkaline Phosphatase 94 U/L 


 


Aspartate Amino Transf


(AST/SGOT) 18 U/L 


 


 


Alanine Aminotransferase


(ALT/SGPT) 19 U/L 


 


 


Total Bilirubin 0.7 MG/DL 


 


Sodium Level 140 MEQ/L 


 


Potassium Level 3.8 MEQ/L 


 


Chloride Level 103 MEQ/L 


 


Carbon Dioxide Level 27.9 MEQ/L 


 


Anion Gap 9 MEQ/L 


 


Estimat Glomerular Filtration


Rate 47 ML/MIN 


 














 Date/Time


Source Procedure


Growth Status


 


 


 3/31/18 21:45


Stool Stool Stool Occult Blood (PRANAV) - Final


HEMOCCULT POSITIVE Complete








Physical Examination


HEENT: PERRL; normocephalic; atraumatic; no jaundice.  


CHEST:  CTA


CARDIAC:  RRR +murmur


ABDOMEN:  Soft, nondistended, nontender; no hepatosplenomegaly; bowel sounds 

are present in all four quadrants.


EXTREMITIES: No clubbing, cyanosis, or edema.


SKIN:  Normal; no rash; no jaundice.


CNS:  No focal deficits; alert and oriented times three.


 (Rupal Bauer)





Assessment and Plan


Plan


ASSESSMENT


- heme pos stool - questionable dark stools, takes iron.  poss UGI/sm bowel 

bleed? recent EGD and colonoscopy 2/24/18 found no bleeding- gastritis, polyps.


    path adenomatous polyp.  He says he has CE scheduled with [facility].  


- anemia - 2/2 above - hgb dropped from 11 to 9.3 this could be multifactorial.


- severe aortic stenosis, s/p catheterization and angiography and per 

cardiology irina need TAVR vs open heart surgery





PLAN


- capsule endoscopy as outpt


- monitor labs


- notify GI of active bleed


- if any GI procedures done inpt, would need cardiac clearance


- further recs to follow


pt seen by myself and Dr Cedeño and this note is on his behalf


 (Rupal Bauer)


Physician Comments


Patient seen and examined


Agree with above


Continue with current supportive care


Monitor labs


Outpatient capsule endoscopy recommended


If any active GI bleed would recommend a bleeding scan


 (Sim Cedeño MD)











Rupal Bauer Apr 1, 2018 13:36


Sim Cedeño MD Apr 1, 2018 14:17

## 2018-04-01 NOTE — CATHPROC
[employer] HIS Report

Study Information

Study Number    Admission           Scheduled Start              Study Start

 

[id number]    Mar 30 2018 4:42PM      04/01/2018 Apr 1 2018 9:28AM

 

Universal Service

 

Cardiac Catheterization

 

Admit Source               Facility Department

 

Emergency department           [facility] - Cath Lab

 

Physician and Clinical Staff

Initial Jmaey Lagunas           Circulator     mAy Gaspar

 

                         Circulator     Neville Espana,RN

 

                         Other        cathlab, cathlab

 

                         Recorder      Sai Clinton RCIS(BS)

 

                         Scrub        Liseth Ragland,(R) (BS)

 

Procedures Performed

Procedure                     Location (Site)             Vessel Name

 

Coronary Angiograms                 LCA                  Left Coronary

Coronary Angiograms                 RCA                  Right Coronary



Equipment

Time            Description           Size          Mfg Part Number  Used/Scraped

                                                [id number]

09:28    [employer]      SWAN GUERITA CATHETER        FR 7                   Used

                                                *[id number]

                    TRANSDUCER, TRUWAVE                   [id number]

09:28    [employer]                       *                     Used

                    W/STOCKCOCK                       *[id number]

                    TRANSDUCER, TRUWAVE                   [id number]

09:28    [employer]                       *                     Used

                    W/STOCKCOCK                       *[id number]

                                                [phone]



                                                *[id number]

                                                [phone]



                                                *[id number]

                                                [phone]



                                                *[id number]

                    [employer] INFINITI                [phone]



                    CATHETER                        *[id number]

                                                [id number]

10:30    [employer] ANGIOSEAL, FR6 VIP          FR 6                   Used

                                                *[id number]

                                                [id number]

09:28    [employer]    PACK, CCL CUSTOM         *                     Used

                                                *[id number]

                                                [id number]

09:28    [employer]ER       PEN, SKIN DUAL W/ RULER     *                     Used

                                                *[id number]

                                                [id number]

10:16    [employer]       SHEATH, FR6.5 PRELUDE 11CM    FR 6.5         [id number]       Used

                                                *[id number]

                                                [id number]

09:28    [employer]       WIRE, 3MMJ .035 180CM      180CM                   Used

                                                *[id number]

                                                [id number]

09:28    [employer]           MANIFOLD, 2 PORT         *                     Used

                                                *[id number]

                                                [id number]

09:28    NAM[employer]           MANIFOLD, 4 PORT         *                     Used

                                                *[id number]

09:28    [employer]          OMNIPAQUE, 350 MG, 100ML     100ML         [id number]      Used

                                                [id number]

09:28    [employer]       BLANKET,WARM AIR CCL       *                     Used

                                                *[id number]

                                                [id number]

09:28    [employer]      SHEATH, FR7 TERUMO (10CM)    FR 7                   Used

                                                *[id number]

 

History: Current Medications

Medication         Dosage/Unit       Route      Frequency     Last Date/Time Taken

 

Statins (any)

 

 

History: Allergies

Allergy               Reaction

 

No Known Allergies

 

 

History: Risk Factors

                     Family History of

Hypertension     Dyslipidemia               Previous MI    Previous Heart Failure

                     Premature CAD

Yes         Yes         No          No        Yes

Prior Valve

           Prior PCI      Prior CABG

Surgery

No          No          No

           Cerebrovascular   Peripheral Artery  Chronic Lung

On Dialysis                                   Diabetes   Diabetes Therapy

           Disease       Disease       Disease

No          No          No          No        Yes      Oral

History: Symptoms/Diagnosis Selection Items

LORD

 

SOB

 

 

History: CV Disease Selection Items

Known CAD

 

 

History: Stress Tests

Stress or Imaging Studies Performed

 

No

 

 

History: Other Disease Selection Items

CAD

 

HTN

 

 

History: MI/CV Data

Previous Cath Date

 

 

01/01/2014

 

 

History: Other

Current Smoker

 

No

 

Labs

Hgb (g/dl)      Hct (%)        WBC (l/cumm)      Platelets (thousands)

 

11.60-17.00     35.00-51.00      4.00-11.00       150..00

 

8.3         28.4          5.9          164

 

Glucose (mg/dl)   BUN (mg/dl)      Creatinine (mg/dl)  BUN:Creatinine (1:x)

74..00     7.00-18.00       0.50-1.30      10.00-20.00

 

92          21           1.4         15

 

Na (meq/l)      K (meq/l)

 

136..00    3.50-5.10

 

140         3.8

 

Troponin I (ng/ml)  CPK-MB (ng/ML)

 

0.02-0.05      0.50-3.60

 

0.05         Not Drawn

 

 

 

 

Medication

Medication Total Dose (Bolus/Oral)

Medication            Total Dosage/Unit

 

1% XYLOCAINE              10 mL

 

VERSED                 1 mg

Medications (Bolus/Oral)

Medication           Time Given          Dosage/Unit       Administered By       Reason

 

VERSED             4/1/2018 10:11:39 AM      1 mg         Neville Espana

1 mg VERSED given in lab by Neville Espana, RN in Left Antecubital via Peripheral IV. Ordered by Jamey Mcgee.

 

1% XYLOCAINE          4/1/2018 10:12:02 AM      10 mL         Jamey East

10 mL 1% XYLOCAINE given in lab by Jamey East in Right Groin via Subcutaneous.

 

Medication (Drip)

Medication           Time Given          Dosage/Unit      Concentration/Unit  Diluent (ml)  Solution

 

IV Solutions          4/1/2018 9:43:32 AM       0 mL (IV)                 500       NaCl .9

Patient arrived on IV Solutions in Left Antecubital via Peripheral IV. Pump/Drip Flow = 20 ml/hr usin
g NaCl .9.

 

 

Initial Case Assessment

Cardiovascular

HR             Rhythm            NIBP             Chest Pain

 

73             nsr             125/79            0

 

Edema Present        Skin color             Skin

 

None             Normal               Warm Dry

 

Circulatory - Right Pulses

Dorsalis Pedis       Femoral

 

1              2

 

Scale (0,1,2,3,4,d)

 

Circulatory - Left Pulses

Dorsalis Pedis       Femoral

 

1              2

 

Scale (0,1,2,3,4,d)

 

Neurological State

              Oriented to time-place-

Alert                        Moves all extremities

              person

 

Respiration - General

Respiration Rate

              SpO2 (%)

(B/min)

15             99

Final Case Assessment

Cardiovascular

HR           Rhythm          NIBP          Chest Pain

 

69           nsr            117/66         0

 

Edema Present      Skin color           Skin

 

None           Normal             Warm Dry

 

Circulatory - Right Pulses

Dorsalis Pedis     Femoral

 

1            2

 

Scale (0,1,2,3,4,d)

 

Circulatory - Left Pulses

Dorsalis Pedis     Femoral

 

1            2

 

Scale (0,1,2,3,4,d)

 

Neurological State

            Oriented to time-place-

Alert                      Moves all extremities

            person

 

Respiration - General

Respiration Rate

            SpO2 (%)

(B/min)

15           99

 

Chronological Log

Time    Study Chronological Log

 

9:43:20   Patient arrived via Bed.

 

9:43:21   Patient Name, D.O.B, / Armband Verified By R.N.

 

9:43:22   Consent signed by the physician and the patient and verified by the Cath Lab staff.

 

9:43:22   Pre-op and post- op instructions given; patient acknowledges understanding of instructions.


 

9:43:23   Verbal Stimulation=2 Physical Stimulation=2 Airway=2 Respiration=2 TOTAL=8. (0=absent, 1=li
mited, 2=present)

 

9:43:24   Presedation assessment performed by Cath Lab RN.

 

9:43:25   Immediate Presedation assesment performed by physician.

 

9:43:25   Patient has been NPO for More than 6Hrs.

 

9:43:26   Skin Breakdown- none per patient

 

9:43:27   Patient Warmer Placed on the Table.

 

9:43:27   Inna Prominences Protected

 

9:43:29   A # 20 IV was noted in the Antecubital (left). Grade = 0

 

9:43:32   Patient arrived on IV Solutions in Left Antecubital via Peripheral IV. Pump/Drip Flow = 20 
ml/hr using NaCl .9.

 

9:43:33   History and physical on the chart or being dictated.

      Assessment: Initial Case, HR=73 BPM, Rhythm=nsr, ALLS=028/79 mmhg, Chest Pain=0, Edema=None, Co
[place]=Normal,

      Skin = Warm, Dry

      Right Pulses: Layo Ped=1, Femoral=2

9:55:19

      Left Pulses: Layo Ped=1, Femoral=2

      Neurological: State=Alert, Ox3, ACKERMAN

      Respiration: Resp=15 B/min, SpO2=99 %

      Vitals capture started with the following parameters, Patient=Adult, Interval=5 min, Initial Pr
ixhcce=077 mmHg,

9:55:21

      Deflation Rate=5 mmHg, Cuff placed on Left Arm

9:55:33  Reference ECG taken

 

9:56:22  HR=73 bpm, JLAJ=034/79 mmhg, SpO2=98.0 %, Resp=15 B/min, Pain=0, Cain=10, Valencia=2

 

10:00:54  HR=70 bpm, PPWP=653/72 mmhg, SpO2=97.0 %, Resp=15 B/min, Pain=0, Cain=10, Valencia=2

 

10:03:31  Pressure channel 1 zeroed.

 

10:05:57  HR=67 bpm, ECVG=310/66 mmhg, SpO2=97.0 %, Resp=15 B/min, Pain=0, Cain=10, Valencia=2

 

10:06:28  MD arrived.

 

10:06:38  Contrast Scanned

 

10:06:39  Immediate Presedation assesment performed by physician.

 

10:10:56  HR=71 bpm, DTEY=526/69 mmhg, SpO2=93.0 %, Resp=15 B/min, Pain=0, Cain=10, Valencia=2

 

10:11:39  1 mg VERSED given in lab by Neville Espana, RN in Left Antecubital via Peripheral IV. Order
ed by Jamey East.

      Time Out. Correct patient, correct procedure, correct physician, power injector not loaded with
 contrast with surgical

10:11:46

      team present. Time Out Concurred by MD and individual staff in procedure.

10:11:56  Case Start

 

10:11:57  Verbal Stimulation=2 Physical Stimulation=2 Airway=2 Respiration=2 TOTAL=8. (0=absent, 1=han munguia, 2=present)

 

10:12:02  10 mL 1% XYLOCAINE given in lab by Jamey East in Right Groin via Subcutaneous.

 

10:15:53  Access site was Right Femoral Artery.

 

10:15:55  A SHEATH, FR6.5 PRELUDE 11CM FR 6.5 was advanced into the Fem Art (right) using the Percuta
neous technique.

 

10:15:57  HR=70 bpm, OCFN=079/65 mmhg, SpO2=91.0 %, Resp=15 B/min, Pain=0, Cain=10, Valencia=2

 

10:18:13  Access site was Right Femoral Vein.

 

10:18:19  A SHEATH, FR7 TERUMO (10CM) FR 7 was advanced into the Fem Vein (right) using the Percutane
ous technique.

 

10:18:43  A SWAN GUERITA CATHETER FR 7 was inserted via Fem Vein (right)

      Recorded Pressure: RA, HR=68, Condition=Condition 1

10:20:20

      (Right Atrium) RA 19/15/13

      Recorded Pressure: MPA, HR=70, Condition=Condition 1

10:20:47

      (Main Pulmonary Artery) MPA 48/21/36

10:20:54  HR=66 bpm, HLOT=447/66 mmhg, SpO2=89.0 %, Resp=15 B/min, Pain=0, Cain=10, Valencia=2

      Recorded Pressure: PCW, HR=69, Condition=Condition 1

10:21:18

      (Pulmonary Capillary Wedge) PCW 35/34/26

      Recorded Pressure: MPA, HR=67, Condition=Condition 1

10:21:47

      (Main Pulmonary Artery) MPA 45/19/33

10:22:21  Saturation: Site=PA (Pulmonary Artery) , O2=51.5 %, Hgb=8.3 gm/dl, Condition=Condition 1. U
sed in calculation.

 

10:22:50  Saturation: Site=Ao (Aorta) , O2=83 %, Hgb=8.3 gm/dl, Condition=Condition 1. Used in calcul
ation.

      Recorded Pressure: RV, HR=67, Condition=Condition 1

10:23:19

      (Right Ventricle) RV 46/10/13

10:24:44  [place] Guerita Catheter Removed

      A JL 5.0 INFINITI CATHETER FR 6 was advanced over a wire. OMNIPAQUE, 350 MG, 100ML 100ML was us
ed for

10:24:45

      injections.

      Recorded Pressure: Ao, HR=67, Condition=Condition 1

10:25:36

      (Aorta) Ao 100/48/68

10:25:56  HR=64 bpm, AEOV=025/60 mmhg, SpO2=95.0 %, Resp=15 B/min, Pain=0, Cain=10, Valencia=2

 

10:26:02  The  LCA was injected and visualized at various angles. OMNIPAQUE, 350 MG, 100ML 100ML used
.

 

10:27:14  Catheter was removed

      A 3DRC INFINITI CATHETER FR 6 was advanced over a wire. OMNIPAQUE, 350 MG, 100ML 100ML was used
 for

10:27:15

      injections.

10:28:33   The  RCA was injected and visualized at various angles. OMNIPAQUE, 350 MG, 100ML 100ML use
d.

 

10:28:41   Catheter was removed

 

10:29:31   An injection in the Fem Art (right) was made through the SHEATH, FR6.5 PRELUDE 11CM FR 6.5
.

 

10:29:48   ANGIOSEAL, FR6 VIP FR 6 placement in the Fem Art (right)

 

10:30:57   HR=74 bpm, BMVC=776/66 mmhg, SpO2=98.0 %, Resp=16 B/min, Pain=0, Cain=10, Valencia=2

 

10:32:27   Case End

       Assessment: Final Case, HR=69 BPM, Rhythm=nsr, BFSD=419/66 mmhg, Chest Pain=0, Edema=None, Col
or=Normal,

       Skin = Warm, Dry

       Right Pulses: Layo Ped=1, Femoral=2

10:32:33

       Left Pulses: Layo Ped=1, Femoral=2

       Neurological: State=Alert, Ox3, ACKERMAN

       Respiration: Resp=15 B/min, SpO2=99 %

10:33:52   Catheter(s) removed without difficulty

 

10:33:53   No case complications noted.

 

10:33:54   Cine recording checked.

 

10:33:56   Bedside Report will be given.

 

10:33:57   Implantable Device card placed in patient's chart.

 

10:33:59   Verbal Stimulation=2 Physical Stimulation=2 Airway=2 Respiration=2 TOTAL=8. (0=absent, 1=l
imited, 2=present)

 

10:34:06   A Left and Right Heart Cath was performed.

 

10:36:00   HR=67 bpm, OFCJ=108/60 mmhg, SpO2=99.0 %, Resp=20 B/min, Pain=0, Cain=10, Valencia=2

 

10:39:39   Vitals capture stopped.

 

10:39:43   Patient moved to stretcher

 

 

 

 

End Study - Contrast Media Used In Study

Contrast        Total Opened (mL)    Total Used (mL)      Total Wasted (mL)

 

Omnipaque       50           50            0

 

End Study - Maximum Contrast Load

Max Contrast Load (mL)

 

297.1

 

End Study - Radiation Exposure

Fluoro Time

(minutes)

2.1

 

 

End Study - Patient Disposition

Complications     Transferred To         Interventional Outcome

 

No           Telemetry Bed          No attempt made

## 2018-04-01 NOTE — HHI.PR
Subjective


Remarks


Follow-up on patient with CHF exacerbation.  Patient seen and examined.  

Patient is Hemoccult positive.  He denies any evidence of bleeding.  He does 

report dark stools but takes iron.  He denies any chest pain or shortness of 

breath.  His breathing has improved.  He denies any nausea, vomiting or 

abdominal pain.  Dr. East plans to take him to cath lab today.





Objective


Vitals





Vital Signs








  Date Time  Temp Pulse Resp B/P (MAP) Pulse Ox O2 Delivery O2 Flow Rate FiO2


 


4/1/18 07:42 98.0 75 14 129/66 (87) 100   


 


4/1/18 04:32  65      


 


4/1/18 03:43 98.6 70 17 126/61 (82) 99   


 


4/1/18 00:30  64      


 


3/31/18 23:57 98.5 72 16 111/55 (73) 99   


 


3/31/18 20:41 97.8 85 18 125/73 (90) 100   


 


3/31/18 20:24     99 Nasal Cannula 2.00 


 


3/31/18 20:20  91      


 


3/31/18 11:48 97.8 71 14 99/54 (69) 96   


 


3/31/18 08:32 98.2 73 14 125/64 (84) 99   














I/O      


 


 3/31/18 3/31/18 3/31/18 4/1/18 4/1/18 4/1/18





 07:00 15:00 23:00 07:00 15:00 23:00


 


Intake Total    20 ml  


 


Output Total 850 ml   1860 ml  


 


Balance -850 ml   -1840 ml  


 


      


 


Intake Oral    20 ml  


 


Output Urine Total 850 ml   1860 ml  








Result Diagram:  


3/31/18 0334                                                                   

             3/31/18 0334





Imaging





Last Impressions








Chest X-Ray 3/30/18 1503 Signed





Impressions: 





 Service Date/Time:  Friday, March 30, 2018 15:32 - CONCLUSION:  1. Right 

basilar 





 consolidation with possible small effusion. 2. Mild atelectatic changes of the 





 left hemidiaphragm. 3. Slight interstitial prominence which could represent 

some 





 degree of vascular congestion or volume overload. Heart size is borderline 





 prominent.     Gal Mayfield MD 








Objective Remarks


GENERAL: This is a well-nourished, well-developed elderly  male patient

, in no apparent distress.  Awake and alert.  Lying in bed.


SKIN: No rashes, warm and dry 


HEAD: Atraumatic. Normocephalic. 


EYES: Extraocular motions intact. No scleral icterus. 


ENT: Nose without bleeding, or drainage, Airway patent.  MMM.


NECK: Trachea midline.  Supple


CARDIOVASCULAR: Regular rate and rhythm 3 out of 6 systolic murmur


RESPIRATORY: Clear to auscultation bilaterally.  Nonlabored.


GASTROINTESTINAL: Abdomen soft, non-tender, nondistended.  Positive bowel sounds


MUSCULOSKELETAL: Extremities without clubbing or cyanosis.  No BLE edema noted.


NEUROLOGICAL: Awake and alert.  Able to move all extremities spontaneously.  

Motor and sensory function grossly intact.  Nonfocal.  Normal speech.


PSYCHIATRIC:  Appropriate mood and affect.  Normal judgment and insight.


Medications and IVs





Current Medications








 Medications


  (Trade)  Dose


 Ordered  Sig/Ramu


 Route  Start Time


 Stop Time Status Last Admin


 


  (NS Flush)  2 ml  UNSCH  PRN


 IVF  3/30/18 15:15


     


 


 


  (Zofran Inj)  4 mg  Q6H  PRN


 IVP  3/30/18 17:30


     


 


 


  (Heparin Inj)  5,000 units  Q8H


 SQ  3/30/18 17:30


    4/1/18 01:36


 


 


  (Norco  5-325 Mg)  1 tab  Q4H  PRN


 PO  3/30/18 17:30


     


 


 


  (Norco  7.5-325


 Mg)  1 tab  Q4H  PRN


 PO  3/30/18 17:30


     


 


 


  (Morphine Inj)  4 mg  Q3H  PRN


 IV PUSH  3/30/18 17:30


     


 


 


  (Narcan Inj)  0.4 mg  UNSCH  PRN


 IV PUSH  3/30/18 17:30


     


 


 


  (Arielle-Colace)  1 tab  BID


 PO  3/30/18 21:00


    3/31/18 20:44


 


 


  (Milk Of


 Magnesia Liq)  30 ml  Q12H  PRN


 PO  3/30/18 17:30


     


 


 


  (Senokot)  17.2 mg  Q12H  PRN


 PO  3/30/18 17:30


     


 


 


  (Lasix Inj)  40 mg  DAILY@0700,1900


 IV PUSH  3/31/18 07:00


    4/1/18 06:45


 


 


  (Lipitor)  40 mg  DAILY


 PO  3/31/18 09:00


    3/31/18 10:00


 


 


  (Vitamin B12)  1,000 mcg  DAILY


 PO  3/31/18 09:00


    3/31/18 10:00


 


 


  (Ferrous Sulfate)  325 mg  DAILY


 PO  3/31/18 09:00


    3/31/18 10:00


 


 


  (Coreg)  3.125 mg  Q12HR


 PO  3/31/18 09:00


    3/31/18 20:45


 


 


  (D50w (Vial) Inj)  50 ml  UNSCH  PRN


 IV PUSH  3/31/18 14:00


     


 


 


  (Glucagon Inj)  1 mg  UNSCH  PRN


 OTHER  3/31/18 14:00


     


 


 


  (NovoLOG


 SUPPLEMENTAL


 SCALE)  1  ACHS SLIDING  SCALE


 SQ  3/31/18 17:00


     


 


 


  (Protonix Inj)  40 mg  Q12H


 IV PUSH  4/1/18 02:00


    4/1/18 03:15


 











A/P


Assessment and Plan


77-year-old male with hypertension, dyslipidemia, aortic stenosis, CHF, CAD, CKD

, gout and diabetes admitted with three-day history of worsening shortness of 

breath and bilateral lower extremity edema.





CHF, decompensated


Last echo 2/11/18 with EF 45-50%, mild LVH, severe aortic stenosis


   -CXR shows right basilar consolidation/effusion with some atelectatic 

changes in the left base


   -BNP 4996


   -Continue on IV Lasix for diuresis


   -Monitor I&Os


   -Fluid/salt restricted diet


   -CHF teaching


   -Continue on Coreg


   -continue to monitor volume status


   -Continue supplemental oxygen to maintain O2 sats above 92%, patient is 100% 

on 2L





CAD, ?previous stenting


Severe aortic stenosis


   -Patient has no complaints of chest pain


   -Cardiology following, appreciate assistance.  Repeat echocardiogram 

confirms severity of AS.  Dr. East to take patient for coronary angiogram 

this morning.  Possible subsequent referral for aortic valve replacement surgery

   


   -NPO, IVF


   -monitor





GIB


History of recent GI bleed requiring transfusion 3 units PRBCs


   -s/p EGD/colonoscopy 2/24/18, irregular z line, mild gastritis, multiple 

polyps


   -no significant source of bleeding identified


   -Hemoccult positive this admission


   -hold Heparin, started on Protonix 40mg BID IV


   -GI consulted, appreciate recommendations


   -repeat hemoglobin this am stable, continue to monitor H/H closely with 

serial studies





Hypertension, chronic, controlled


   -Continue on Coreg but at decreased dose with holding parameters


   -continue to monitor BP and adjust accordingly





JOSIE on Suspected CKD stage III


   -baseline creatinine likely around 1.3-1.4


   -creatinine up from 1.36 to 1.46 while on IV Lasix


   -avoid nephrotoxic agents


   -continue to monitor renal indices





Diabetes


   -Continue to hold metformin


   -Accu-Cheks and insulin sliding scale


   -Obtain hemoglobin A1c/pending





Iron deficiency anemia, secondary to GIB


   -Resume home dose of iron daily


   -Hemoglobin with drop down 11 to 9.2, likely multifactorial.  Stool 

hemoccult positive.


   -Serial H/H studies





Gout


   -no exacerbation


   -monitor





DVT prophylaxis


   -sq Heparin on hold secondary to GIB


Discharge Planning


Discharge pending clinical workup, cardiology clearance and GI clearance











Margret Qureshi Apr 1, 2018 07:47

## 2018-04-01 NOTE — PD.CARD.PN
Subjective


Subjective Remarks


No complaints





Objective


Medications





Current Medications








 Medications


  (Trade)  Dose


 Ordered  Sig/Ramu


 Route  Start Time


 Stop Time Status Last Admin


 


  (NS Flush)  2 ml  UNSCH  PRN


 IVF  3/30/18 15:15


     


 


 


  (Zofran Inj)  4 mg  Q6H  PRN


 IVP  3/30/18 17:30


     


 


 


  (Heparin Inj)  5,000 units  Q8H


 SQ  3/30/18 17:30


   Future Hold 4/1/18 01:36


 


 


  (Norco  5-325 Mg)  1 tab  Q4H  PRN


 PO  3/30/18 17:30


     


 


 


  (Norco  7.5-325


 Mg)  1 tab  Q4H  PRN


 PO  3/30/18 17:30


     


 


 


  (Morphine Inj)  4 mg  Q3H  PRN


 IV PUSH  3/30/18 17:30


     


 


 


  (Narcan Inj)  0.4 mg  UNSCH  PRN


 IV PUSH  3/30/18 17:30


     


 


 


  (Arielle-Colace)  1 tab  BID


 PO  3/30/18 21:00


    3/31/18 20:44


 


 


  (Milk Of


 Magnesia Liq)  30 ml  Q12H  PRN


 PO  3/30/18 17:30


     


 


 


  (Senokot)  17.2 mg  Q12H  PRN


 PO  3/30/18 17:30


     


 


 


  (Lasix Inj)  40 mg  DAILY@0700,1900


 IV PUSH  3/31/18 07:00


    4/1/18 06:45


 


 


  (Lipitor)  40 mg  DAILY


 PO  3/31/18 09:00


    3/31/18 10:00


 


 


  (Vitamin B12)  1,000 mcg  DAILY


 PO  3/31/18 09:00


    3/31/18 10:00


 


 


  (Ferrous Sulfate)  325 mg  DAILY


 PO  3/31/18 09:00


    3/31/18 10:00


 


 


  (Coreg)  3.125 mg  Q12HR


 PO  3/31/18 09:00


    3/31/18 20:45


 


 


  (D50w (Vial) Inj)  50 ml  UNSCH  PRN


 IV PUSH  3/31/18 14:00


     


 


 


  (Glucagon Inj)  1 mg  UNSCH  PRN


 OTHER  3/31/18 14:00


     


 


 


  (NovoLOG


 SUPPLEMENTAL


 SCALE)  1  ACHS SLIDING  SCALE


 SQ  3/31/18 17:00


     


 


 


  (Protonix Inj)  40 mg  Q12H


 IV PUSH  4/1/18 02:00


    4/1/18 03:15


 








Vital Signs / I&O





Vital Signs








  Date Time  Temp Pulse Resp B/P (MAP) Pulse Ox O2 Delivery O2 Flow Rate FiO2


 


4/1/18 08:20  66      


 


4/1/18 07:42 98.0 75 14 129/66 (87) 100   


 


4/1/18 04:32  65      


 


4/1/18 03:43 98.6 70 17 126/61 (82) 99   


 


4/1/18 00:30  64      


 


3/31/18 23:57 98.5 72 16 111/55 (73) 99   


 


3/31/18 20:41 97.8 85 18 125/73 (90) 100   


 


3/31/18 20:24     99 Nasal Cannula 2.00 


 


3/31/18 20:20  91      


 


3/31/18 11:48 97.8 71 14 99/54 (69) 96   














I/O      


 


 3/31/18 3/31/18 3/31/18 4/1/18 4/1/18 4/1/18





 07:00 15:00 23:00 07:00 15:00 23:00


 


Intake Total    20 ml  


 


Output Total 850 ml   1860 ml  


 


Balance -850 ml   -1840 ml  


 


      


 


Intake Oral    20 ml  


 


Output Urine Total 850 ml   1860 ml  








Physical Exam


Alert


Chest clear


CV S1S2 RRR with AS murmur


Abd soft


Ext: No edema


Laboratory





Laboratory Tests








Test


  4/1/18


07:42


 


White Blood Count 5.9 TH/MM3 


 


Red Blood Count 3.35 MIL/MM3 


 


Hemoglobin 9.3 GM/DL 


 


Hematocrit 28.4 % 


 


Mean Corpuscular Volume 84.8 FL 


 


Mean Corpuscular Hemoglobin 27.6 PG 


 


Mean Corpuscular Hemoglobin


Concent 32.6 % 


 


 


Red Cell Distribution Width 24.1 % 


 


Platelet Count 164 TH/MM3 


 


Mean Platelet Volume 9.9 FL 


 


Neutrophils (%) (Auto) 75.9 % 


 


Lymphocytes (%) (Auto) 12.8 % 


 


Monocytes (%) (Auto) 7.6 % 


 


Eosinophils (%) (Auto) 3.0 % 


 


Basophils (%) (Auto) 0.7 % 


 


Neutrophils # (Auto) 4.5 TH/MM3 


 


Lymphocytes # (Auto) 0.8 TH/MM3 


 


Monocytes # (Auto) 0.4 TH/MM3 


 


Eosinophils # (Auto) 0.2 TH/MM3 


 


Basophils # (Auto) 0.0 TH/MM3 


 


CBC Comment DIFF FINAL 


 


Differential Comment  











Assessment and Plan


Problem List:  


(1) Aortic stenosis


ICD Codes:  I35.0 - Nonrheumatic aortic (valve) stenosis


Status:  Chronic


Plan:  Severe confirmed now by 2 echo's. Plan coronary angio this AM. Informed 

consent obtained.





(2) CAD (coronary artery disease)


ICD Codes:  I25.10 - Atherosclerotic heart disease of native coronary artery 

without angina pectoris


Status:  Chronic


(3) Diabetes mellitus


ICD Codes:  E11.9 - Type 2 diabetes mellitus without complications


(4) CHF (congestive heart failure)


ICD Codes:  I50.9 - Heart failure, unspecified


Status:  Acute





Problem Qualifiers





(1) CHF (congestive heart failure):  


Qualified Codes:  I50.9 - Heart failure, unspecified








Jamey East MD Apr 1, 2018 08:55

## 2018-04-02 VITALS
SYSTOLIC BLOOD PRESSURE: 109 MMHG | DIASTOLIC BLOOD PRESSURE: 68 MMHG | OXYGEN SATURATION: 98 % | TEMPERATURE: 98.3 F | HEART RATE: 76 BPM | RESPIRATION RATE: 18 BRPM

## 2018-04-02 VITALS — HEART RATE: 70 BPM

## 2018-04-02 VITALS
DIASTOLIC BLOOD PRESSURE: 63 MMHG | HEART RATE: 68 BPM | OXYGEN SATURATION: 98 % | RESPIRATION RATE: 20 BRPM | SYSTOLIC BLOOD PRESSURE: 119 MMHG | TEMPERATURE: 98.1 F

## 2018-04-02 VITALS
RESPIRATION RATE: 20 BRPM | TEMPERATURE: 98.1 F | DIASTOLIC BLOOD PRESSURE: 62 MMHG | SYSTOLIC BLOOD PRESSURE: 128 MMHG | HEART RATE: 68 BPM | OXYGEN SATURATION: 99 %

## 2018-04-02 VITALS — HEART RATE: 66 BPM

## 2018-04-02 VITALS — HEART RATE: 68 BPM

## 2018-04-02 VITALS
SYSTOLIC BLOOD PRESSURE: 115 MMHG | RESPIRATION RATE: 20 BRPM | TEMPERATURE: 98 F | OXYGEN SATURATION: 94 % | DIASTOLIC BLOOD PRESSURE: 63 MMHG | HEART RATE: 74 BPM

## 2018-04-02 VITALS — HEART RATE: 71 BPM

## 2018-04-02 VITALS — HEART RATE: 67 BPM

## 2018-04-02 VITALS
DIASTOLIC BLOOD PRESSURE: 56 MMHG | OXYGEN SATURATION: 95 % | SYSTOLIC BLOOD PRESSURE: 114 MMHG | RESPIRATION RATE: 16 BRPM | HEART RATE: 69 BPM

## 2018-04-02 VITALS — HEART RATE: 74 BPM

## 2018-04-02 VITALS — HEART RATE: 72 BPM

## 2018-04-02 VITALS — OXYGEN SATURATION: 95 %

## 2018-04-02 VITALS — HEART RATE: 64 BPM

## 2018-04-02 VITALS — HEART RATE: 76 BPM

## 2018-04-02 VITALS — HEART RATE: 60 BPM

## 2018-04-02 LAB
BASOPHILS # BLD AUTO: 0.1 TH/MM3 (ref 0–0.2)
BASOPHILS NFR BLD: 0.9 % (ref 0–2)
BUN SERPL-MCNC: 22 MG/DL (ref 7–18)
CALCIUM SERPL-MCNC: 8.7 MG/DL (ref 8.5–10.1)
CHLORIDE SERPL-SCNC: 105 MEQ/L (ref 98–107)
CHOLEST SERPL-MCNC: 87 MG/DL (ref 120–200)
CHOLESTEROL/ HDL RATIO: 1.97 RATIO
CREAT SERPL-MCNC: 1.53 MG/DL (ref 0.6–1.3)
EOSINOPHIL # BLD: 0.2 TH/MM3 (ref 0–0.4)
EOSINOPHIL NFR BLD: 2.6 % (ref 0–4)
ERYTHROCYTE [DISTWIDTH] IN BLOOD BY AUTOMATED COUNT: 24 % (ref 11.6–17.2)
GFR SERPLBLD BASED ON 1.73 SQ M-ARVRAT: 44 ML/MIN (ref 89–?)
GLUCOSE SERPL-MCNC: 89 MG/DL (ref 74–106)
HBA1C MFR BLD: 4.8 % (ref 4.3–6)
HCO3 BLD-SCNC: 30.6 MEQ/L (ref 21–32)
HCT VFR BLD CALC: 30.7 % (ref 39–51)
HDLC SERPL-MCNC: 44 MG/DL (ref 40–60)
HGB BLD-MCNC: 9.8 GM/DL (ref 13–17)
LDLC SERPL-MCNC: 28 MG/DL (ref 0–99)
LYMPHOCYTES # BLD AUTO: 0.9 TH/MM3 (ref 1–4.8)
LYMPHOCYTES NFR BLD AUTO: 14.3 % (ref 9–44)
MCH RBC QN AUTO: 27.1 PG (ref 27–34)
MCHC RBC AUTO-ENTMCNC: 31.9 % (ref 32–36)
MCV RBC AUTO: 85 FL (ref 80–100)
MONOCYTE #: 0.5 TH/MM3 (ref 0–0.9)
MONOCYTES NFR BLD: 8.7 % (ref 0–8)
NEUTROPHILS # BLD AUTO: 4.4 TH/MM3 (ref 1.8–7.7)
NEUTROPHILS NFR BLD AUTO: 73.5 % (ref 16–70)
PLATELET # BLD: 176 TH/MM3 (ref 150–450)
PMV BLD AUTO: 10 FL (ref 7–11)
RBC # BLD AUTO: 3.62 MIL/MM3 (ref 4.5–5.9)
SODIUM SERPL-SCNC: 141 MEQ/L (ref 136–145)
TRIGL SERPL-MCNC: 75 MG/DL (ref 42–150)
WBC # BLD AUTO: 6 TH/MM3 (ref 4–11)

## 2018-04-02 RX ADMIN — PANTOPRAZOLE SODIUM SCH MG: 40 INJECTION, POWDER, FOR SOLUTION INTRAVENOUS at 02:41

## 2018-04-02 RX ADMIN — FUROSEMIDE SCH MG: 10 INJECTION, SOLUTION INTRAMUSCULAR; INTRAVENOUS at 06:37

## 2018-04-02 RX ADMIN — STANDARDIZED SENNA CONCENTRATE AND DOCUSATE SODIUM SCH TAB: 8.6; 5 TABLET, FILM COATED ORAL at 09:00

## 2018-04-02 RX ADMIN — CYANOCOBALAMIN TAB 1000 MCG SCH MCG: 1000 TAB at 09:00

## 2018-04-02 RX ADMIN — ATORVASTATIN CALCIUM SCH MG: 40 TABLET, FILM COATED ORAL at 09:00

## 2018-04-02 RX ADMIN — Medication SCH ML: at 09:00

## 2018-04-02 RX ADMIN — FERROUS SULFATE TAB 325 MG (65 MG ELEMENTAL FE) SCH MG: 325 (65 FE) TAB at 09:00

## 2018-04-02 RX ADMIN — INSULIN ASPART SCH: 100 INJECTION, SOLUTION INTRAVENOUS; SUBCUTANEOUS at 08:00

## 2018-04-02 RX ADMIN — WATER SCH ML: 1 IRRIGANT IRRIGATION at 16:00

## 2018-04-02 RX ADMIN — FUROSEMIDE SCH MG: 10 INJECTION, SOLUTION INTRAMUSCULAR; INTRAVENOUS at 18:03

## 2018-04-02 RX ADMIN — CARVEDILOL SCH MG: 3.12 TABLET, FILM COATED ORAL at 21:27

## 2018-04-02 RX ADMIN — MAGNESIUM HYDROXIDE PRN ML: 400 SUSPENSION ORAL at 13:06

## 2018-04-02 RX ADMIN — INSULIN ASPART SCH: 100 INJECTION, SOLUTION INTRAVENOUS; SUBCUTANEOUS at 21:00

## 2018-04-02 RX ADMIN — INSULIN ASPART SCH: 100 INJECTION, SOLUTION INTRAVENOUS; SUBCUTANEOUS at 16:04

## 2018-04-02 RX ADMIN — CARVEDILOL SCH MG: 3.12 TABLET, FILM COATED ORAL at 09:00

## 2018-04-02 RX ADMIN — INSULIN ASPART SCH: 100 INJECTION, SOLUTION INTRAVENOUS; SUBCUTANEOUS at 11:27

## 2018-04-02 RX ADMIN — PANTOPRAZOLE SODIUM SCH MG: 40 INJECTION, POWDER, FOR SOLUTION INTRAVENOUS at 13:06

## 2018-04-02 RX ADMIN — STANDARDIZED SENNA CONCENTRATE AND DOCUSATE SODIUM SCH TAB: 8.6; 5 TABLET, FILM COATED ORAL at 21:00

## 2018-04-02 RX ADMIN — Medication SCH ML: at 21:27

## 2018-04-02 RX ADMIN — ASPIRIN SCH MG: 81 TABLET ORAL at 09:00

## 2018-04-02 NOTE — PD.CARD.PN
Subjective


Subjective Remarks


Pt without complaints





Objective


Medications





Current Medications








 Medications


  (Trade)  Dose


 Ordered  Sig/Ramu


 Route  Start Time


 Stop Time Status Last Admin


 


  (Zofran Inj)  4 mg  Q6H  PRN


 IVP  3/30/18 17:30


     


 


 


  (Heparin Inj)  5,000 units  Q8H


 SQ  3/30/18 17:30


   Future Hold 4/1/18 01:36


 


 


  (Norco  5-325 Mg)  1 tab  Q4H  PRN


 PO  3/30/18 17:30


     


 


 


  (Norco  7.5-325


 Mg)  1 tab  Q4H  PRN


 PO  3/30/18 17:30


     


 


 


  (Morphine Inj)  4 mg  Q3H  PRN


 IV PUSH  3/30/18 17:30


     


 


 


  (Narcan Inj)  0.4 mg  UNSCH  PRN


 IV PUSH  3/30/18 17:30


     


 


 


  (Arielle-Colace)  1 tab  BID


 PO  3/30/18 21:00


    4/1/18 21:01


 


 


  (Milk Of


 Magnesia Liq)  30 ml  Q12H  PRN


 PO  3/30/18 17:30


     


 


 


  (Senokot)  17.2 mg  Q12H  PRN


 PO  3/30/18 17:30


     


 


 


  (Lasix Inj)  40 mg  DAILY@0700,1900


 IV PUSH  3/31/18 07:00


    4/2/18 06:37


 


 


  (Lipitor)  40 mg  DAILY


 PO  3/31/18 09:00


    4/1/18 08:57


 


 


  (Vitamin B12)  1,000 mcg  DAILY


 PO  3/31/18 09:00


    4/1/18 08:56


 


 


  (Ferrous Sulfate)  325 mg  DAILY


 PO  3/31/18 09:00


    4/1/18 08:56


 


 


  (Coreg)  3.125 mg  Q12HR


 PO  3/31/18 09:00


    4/1/18 21:01


 


 


  (D50w (Vial) Inj)  50 ml  UNSCH  PRN


 IV PUSH  3/31/18 14:00


     


 


 


  (Glucagon Inj)  1 mg  UNSCH  PRN


 OTHER  3/31/18 14:00


     


 


 


  (NovoLOG


 SUPPLEMENTAL


 SCALE)  1  ACHS SLIDING  SCALE


 SQ  3/31/18 17:00


     


 


 


  (Protonix Inj)  40 mg  Q12H


 IV PUSH  4/1/18 02:00


    4/2/18 02:41


 


 


  (Benadryl)  25 mg  ON  CALL


 PO  4/1/18 09:00


 4/5/18 08:59  4/1/18 09:23


 


 


  (Valium)  5 mg  ON  CALL


 PO  4/1/18 09:00


 4/5/18 08:59  4/1/18 09:23


 


 


  (NS Flush)  2 ml  BID


 IV FLUSH  4/1/18 21:00


    4/1/18 21:01


 


 


  (NS Flush)  2 ml  UNSCH  PRN


 IV FLUSH  4/1/18 10:45


     


 


 


  (Zofran Inj)  4 mg  Q4H  PRN


 IV PUSH  4/1/18 10:45


     


 








Vital Signs / I&O





Vital Signs








  Date Time  Temp Pulse Resp B/P (MAP) Pulse Ox O2 Delivery O2 Flow Rate FiO2


 


4/2/18 03:06  69 16 114/56 (75) 95   


 


4/2/18 03:00  64      


 


4/2/18 02:00  68      


 


4/2/18 01:00  70      


 


4/2/18 00:00  72      


 


4/1/18 23:22  71 16 130/76 (94) 95   


 


4/1/18 23:00  68      


 


4/1/18 22:00  68      


 


4/1/18 21:00  70      


 


4/1/18 20:38 98.7 79 16 122/83 (96) 96   


 


4/1/18 20:35        21


 


4/1/18 20:00  76      


 


4/1/18 19:00  76      


 


4/1/18 18:00  68      


 


4/1/18 17:00  75      


 


4/1/18 16:00  78      


 


4/1/18 15:56     97   21


 


4/1/18 15:02 97.8 75 16 127/76 (93) 94   


 


4/1/18 15:00  84      


 


4/1/18 14:00  76      


 


4/1/18 13:00  62      


 


4/1/18 12:01  65      


 


4/1/18 11:05 97.8 67 16 122/64 (83) 97   


 


4/1/18 11:00  71      


 


4/1/18 08:20  66      


 


4/1/18 07:42 98.0 75 14 129/66 (87) 100   














I/O      


 


 4/1/18 4/1/18 4/1/18 4/2/18 4/2/18 4/2/18





 06:59 14:59 22:59 06:59 14:59 22:59


 


Intake Total 20 ml  480 ml 480 ml  


 


Output Total 1860 ml  450 ml 1250 ml  


 


Balance -1840 ml  30 ml -770 ml  


 


      


 


Intake Oral 20 ml  480 ml 480 ml  


 


Output Urine Total 1860 ml  450 ml 1250 ml  


 


# Bowel Movements    0  








Physical Exam


GENERAL: Well developed, well nourished. No acute distress.


HEENT: Jugular venous pressure is normal. 


CHEST: Lungs clear to auscultation bilaterally. Unlabored respiratory effort.


CARDIAC: Regular rate and rhythm, harsh GEORGES.


ABDOMEN: Soft, Bowel sounds present.


EXTREMITIES: No clubbing, cyanosis, or edema. right groin c/d/i s/p angioseal, 1

+ DP


Laboratory





Laboratory Tests








Test


  4/1/18


07:42 4/1/18


15:25 4/2/18


06:33


 


White Blood Count 5.9 TH/MM3   


 


Red Blood Count 3.35 MIL/MM3   


 


Hemoglobin 9.3 GM/DL  9.6 GM/DL  


 


Hematocrit 28.4 %  30.0 %  


 


Mean Corpuscular Volume 84.8 FL   


 


Mean Corpuscular Hemoglobin 27.6 PG   


 


Mean Corpuscular Hemoglobin


Concent 32.6 % 


  


  


 


 


Red Cell Distribution Width 24.1 %   


 


Platelet Count 164 TH/MM3   


 


Mean Platelet Volume 9.9 FL   


 


Neutrophils (%) (Auto) 75.9 %   


 


Lymphocytes (%) (Auto) 12.8 %   


 


Monocytes (%) (Auto) 7.6 %   


 


Eosinophils (%) (Auto) 3.0 %   


 


Basophils (%) (Auto) 0.7 %   


 


Neutrophils # (Auto) 4.5 TH/MM3   


 


Lymphocytes # (Auto) 0.8 TH/MM3   


 


Monocytes # (Auto) 0.4 TH/MM3   


 


Eosinophils # (Auto) 0.2 TH/MM3   


 


Basophils # (Auto) 0.0 TH/MM3   


 


CBC Comment DIFF FINAL   


 


Differential Comment    


 


Blood Urea Nitrogen 21 MG/DL   


 


Creatinine 1.46 MG/DL   


 


Random Glucose 92 MG/DL   


 


Total Protein 6.5 GM/DL   


 


Albumin 3.5 GM/DL   


 


Calcium Level 9.0 MG/DL   


 


Alkaline Phosphatase 94 U/L   


 


Aspartate Amino Transf


(AST/SGOT) 18 U/L 


  


  


 


 


Alanine Aminotransferase


(ALT/SGPT) 19 U/L 


  


  


 


 


Total Bilirubin 0.7 MG/DL   


 


Sodium Level 140 MEQ/L   


 


Potassium Level 3.8 MEQ/L   


 


Chloride Level 103 MEQ/L   


 


Carbon Dioxide Level 27.9 MEQ/L   


 


Anion Gap 9 MEQ/L   


 


Estimat Glomerular Filtration


Rate 47 ML/MIN 


  


  


 











Assessment and Plan


Problem List:  


(1) Aortic stenosis


ICD Codes:  I35.0 - Nonrheumatic aortic (valve) stenosis


Status:  Chronic


Plan:  severe AS, await surgical eval for SAVR vs TAVR vs med rx give 

significant comorbid conditions





(2) CAD (coronary artery disease)


ICD Codes:  I25.10 - Atherosclerotic heart disease of native coronary artery 

without angina pectoris


Status:  Chronic


Plan:  stable, awaiting surgical eval


--check lipids, 





(3) Diabetes mellitus


ICD Codes:  E11.9 - Type 2 diabetes mellitus without complications


(4) CHF (congestive heart failure)


ICD Codes:  I50.9 - Heart failure, unspecified


Status:  Acute


Plan:  continue present meds





(5) Ischemic cardiomyopathy


ICD Codes:  I25.5 - Ischemic cardiomyopathy


Plan:  on BB, no ACE / ARB with CKD





(6) JOSIE (acute kidney injury)


ICD Codes:  N17.9 - Acute kidney failure, unspecified


(7) Severe anemia


ICD Codes:  D64.9 - Anemia, unspecified


Status:  Acute


Plan:  Pt indicates he may need another scope, clearly at elevated risk given 

his severe AS and low EF


-risk / bene likely favors going forward as he likely will not be able to have 

TAVR/SAVR without Pandoscopy








Problem Qualifiers





(1) CHF (congestive heart failure):  


Qualified Codes:  I50.9 - Heart failure, unspecified








Radha Mason MD Apr 2, 2018 07:35

## 2018-04-02 NOTE — PD.CAR.PN
CVT Progress Note


Subjective/Hospital Course:


pt seen and evaluated full consult to follow 


recommendation for TAVR


 RISK SCORES


 About the STS Risk Calculator


 Procedure: AV Replacement 


Risk of Mortality: 4.13% 


Morbidity or Mortality: 27.702% 


Long Length of Stay: 10.828% 


Short Length of Stay: 26.32% 


Permanent Stroke: 1.068% 


Prolonged Ventilation: 17.343% 


DSW Infection: 0.32% 


Renal Failure: 7.809% 


Reoperation: 11.949%


Objective:





Vital Signs








  Date Time  Temp Pulse Resp B/P (MAP) Pulse Ox O2 Delivery O2 Flow Rate FiO2


 


4/2/18 10:00  70      


 


4/2/18 09:00  64      


 


4/2/18 08:00  64      


 


4/2/18 08:00 98.1 68 20 128/62 (84) 99   


 


4/2/18 07:00  66      


 


4/2/18 03:06  69 16 114/56 (75) 95   


 


4/2/18 03:00  64      


 


4/2/18 02:00  68      


 


4/2/18 01:00  70      


 


4/2/18 00:00  72      


 


4/1/18 23:22  71 16 130/76 (94) 95   


 


4/1/18 23:00  68      


 


4/1/18 22:00  68      


 


4/1/18 21:00  70      


 


4/1/18 20:38 98.7 79 16 122/83 (96) 96   


 


4/1/18 20:35        21


 


4/1/18 20:00  76      


 


4/1/18 19:00  76      


 


4/1/18 18:00  68      


 


4/1/18 17:00  75      


 


4/1/18 16:00  78      


 


4/1/18 15:56     97   21


 


4/1/18 15:02 97.8 75 16 127/76 (93) 94   


 


4/1/18 15:00  84      


 


4/1/18 14:00  76      


 


4/1/18 13:00  62      


 


4/1/18 12:01  65      








Labs:





Laboratory Tests








Test


  4/2/18


06:33


 


White Blood Count


  6.0 TH/MM3


(4.0-11.0)


 


Red Blood Count


  3.62 MIL/MM3


(4.50-5.90)


 


Hemoglobin


  9.8 GM/DL


(13.0-17.0)


 


Hematocrit


  30.7 %


(39.0-51.0)


 


Mean Corpuscular Volume


  85.0 FL


(80.0-100.0)


 


Mean Corpuscular Hemoglobin


  27.1 PG


(27.0-34.0)


 


Mean Corpuscular Hemoglobin


Concent 31.9 %


(32.0-36.0)


 


Red Cell Distribution Width


  24.0 %


(11.6-17.2)


 


Platelet Count


  176 TH/MM3


(150-450)


 


Mean Platelet Volume


  10.0 FL


(7.0-11.0)


 


Neutrophils (%) (Auto)


  73.5 %


(16.0-70.0)


 


Lymphocytes (%) (Auto)


  14.3 %


(9.0-44.0)


 


Monocytes (%) (Auto)


  8.7 %


(0.0-8.0)


 


Eosinophils (%) (Auto)


  2.6 %


(0.0-4.0)


 


Basophils (%) (Auto)


  0.9 %


(0.0-2.0)


 


Neutrophils # (Auto)


  4.4 TH/MM3


(1.8-7.7)


 


Lymphocytes # (Auto)


  0.9 TH/MM3


(1.0-4.8)


 


Monocytes # (Auto)


  0.5 TH/MM3


(0-0.9)


 


Eosinophils # (Auto)


  0.2 TH/MM3


(0-0.4)


 


Basophils # (Auto)


  0.1 TH/MM3


(0-0.2)


 


CBC Comment DIFF FINAL 


 


Differential Comment  


 


Blood Urea Nitrogen


  22 MG/DL


(7-18)


 


Creatinine


  1.53 MG/DL


(0.60-1.30)


 


Random Glucose


  89 MG/DL


()


 


Calcium Level


  8.7 MG/DL


(8.5-10.1)


 


Sodium Level


  141 MEQ/L


(136-145)


 


Potassium Level


  3.6 MEQ/L


(3.5-5.1)


 


Chloride Level


  105 MEQ/L


()


 


Carbon Dioxide Level


  30.6 MEQ/L


(21.0-32.0)


 


Anion Gap 5 MEQ/L (5-15) 


 


Estimat Glomerular Filtration


Rate 44 ML/MIN


(>89)


 


B-Type Natriuretic Peptide


  3506 PG/ML


(0-100)


 


Triglycerides Level


  75 MG/DL


()


 


Cholesterol Level


  87 MG/DL


(120-200)


 


LDL Cholesterol


  28 MG/DL


(0-99)


 


HDL Cholesterol


  44.0 MG/DL


(40.0-60.0)


 


Cholesterol/HDL Ratio 1.97 RATIO 








Result Diagram:  


4/2/18 0633                                                                    

            4/2/18 0633








(1) Aortic stenosis


Plan:  severe AS, await surgical eval for SAVR vs TAVR vs med rx give 

significant comorbid conditions





(2) CAD (coronary artery disease)


Plan:  stable, awaiting surgical eval


--check lipids, 





(3) Diabetes mellitus


(4) CHF (congestive heart failure)


Plan:  continue present meds





(5) Ischemic cardiomyopathy


Plan:  on BB, no ACE / ARB with CKD





(6) JOSIE (acute kidney injury)


(7) Severe anemia


Plan:  Pt indicates he may need another scope, clearly at elevated risk given 

his severe AS and low EF


-risk / bene likely favors going forward as he likely will not be able to have 

TAVR/SAVR without Pandoscopy








Problem Qualifiers





(1) CHF (congestive heart failure):  


Qualified Codes:  I50.9 - Heart failure, unspecified








Terwilliger,Jacqueline R. ARNP Apr 2, 2018 11:07

## 2018-04-02 NOTE — HHI.PR
Subjective


Remarks


Patient reports he is feeling okay at rest.  Gets short of breath with minimal 

activities.





Objective


Vitals





Vital Signs








  Date Time  Temp Pulse Resp B/P (MAP) Pulse Ox O2 Delivery O2 Flow Rate FiO2


 


4/2/18 12:00  60      


 


4/2/18 11:00 98.1 70 20 119/63 (81) 98   


 


4/2/18 11:00  68      


 


4/2/18 10:00  70      


 


4/2/18 09:00  64      


 


4/2/18 08:00  64      


 


4/2/18 08:00 98.1 68 20 128/62 (84) 99   


 


4/2/18 07:00  66      


 


4/2/18 03:06  69 16 114/56 (75) 95   


 


4/2/18 03:00  64      


 


4/2/18 02:00  68      


 


4/2/18 01:00  70      


 


4/2/18 00:00  72      


 


4/1/18 23:22  71 16 130/76 (94) 95   


 


4/1/18 23:00  68      


 


4/1/18 22:00  68      


 


4/1/18 21:00  70      


 


4/1/18 20:38 98.7 79 16 122/83 (96) 96   


 


4/1/18 20:35        21


 


4/1/18 20:00  76      


 


4/1/18 19:00  76      


 


4/1/18 18:00  68      


 


4/1/18 17:00  75      


 


4/1/18 16:00  78      


 


4/1/18 15:56     97   21


 


4/1/18 15:02 97.8 75 16 127/76 (93) 94   


 


4/1/18 15:00  84      


 


4/1/18 14:00  76      


 


4/1/18 13:00  62      














I/O      


 


 4/1/18 4/1/18 4/1/18 4/2/18 4/2/18 4/2/18





 07:00 15:00 23:00 07:00 15:00 23:00


 


Intake Total 20 ml  480 ml 480 ml  


 


Output Total 1860 ml  450 ml 1250 ml  


 


Balance -1840 ml  30 ml -770 ml  


 


      


 


Intake Oral 20 ml  480 ml 480 ml  


 


Output Urine Total 1860 ml  450 ml 1250 ml  


 


# Bowel Movements    0  








Result Diagram:  


4/2/18 0633 4/2/18 0633





Objective Remarks


GENERAL: This is a well-nourished, well-developed patient, in no apparent 

distress.


CARDIOVASCULAR: Normal rate and regular rhythm.  3 out of 6 GEORGES murmur best 

heard over the right upper sternal border


RESPIRATORY: Good respiratory efforts. Breath sounds equal and clear to 

auscultation bilaterally.


GASTROINTESTINAL: Abdomen soft, non-tender, non-distended. Normal active bowel 

sounds


MUSCULOSKELETAL: Extremities without cyanosis, or edema.


NEURO:  Alert & Oriented x4 to person, place, time, situation.  Moves all ext x4


PSYCH: Appropriate mood and affect.





A/P


Assessment and Plan


77-year-old male with hypertension, dyslipidemia, aortic stenosis, CHF, CAD, CKD

, gout and diabetes admitted with three-day history of worsening shortness of 

breath and bilateral lower extremity edema.





Acute on chronic systolic CHF, decompensated


Last echo 2/11/18 with EF 45-50%, mild LVH, severe aortic stenosis


   -CXR shows right basilar consolidation/effusion with some atelectatic 

changes in the left base


   -BNP 4996


   -Continue on IV Lasix for diuresis


   -Monitor I&Os


   -Fluid/salt restricted diet


   -CHF teaching


   -Continue on Coreg


   -continue to monitor volume status


   -Continue supplemental oxygen to maintain O2 sats above 92%, patient is 100% 

on 2L





CAD, ?previous stenting


Severe aortic stenosis


   -Cardiology following, appreciate assistance.  Repeat echocardiogram 

confirms severity of AS.  Status post heart catheterization, recommend medical 

management and CT surgery consulted to consider TAVR   


   -monitor





GIB


History of recent GI bleed requiring transfusion 3 units PRBCs


   -s/p EGD/colonoscopy 2/24/18, irregular z line, mild gastritis, multiple 

polyps


   -no significant source of bleeding identified


   -Hemoccult positive this admission


   -hold Heparin, started on Protonix 40mg BID IV


   -GI consulted, appreciate recommendations


   -Follow H&H.





Hypertension, chronic, controlled


   -Continue on Coreg but at decreased dose with holding parameters


   -continue to monitor BP and adjust accordingly





JOSIE on Suspected CKD stage III


   -baseline creatinine likely around 1.3-1.4


   -creatinine up from 1.36 to 1.46 while on IV Lasix


   -avoid nephrotoxic agents


   -continue to monitor renal indices





Diabetes


   -Continue to hold metformin


   -Accu-Cheks and insulin sliding scale


   -Obtain hemoglobin A1c/pending





Iron deficiency anemia, secondary to GIB


   -Resume home dose of iron daily


   -Hemoglobin with drop down 11 to 9.2, likely multifactorial.  Stool 

Hemoccult positive.


   -Serial H/H studies





Gout


   -no exacerbation


   -monitor





DVT prophylaxis


   -sq Heparin on hold secondary to ?GIB.  Encourage ambulation











Jasson Burris MD Apr 2, 2018 12:13

## 2018-04-02 NOTE — MB
cc:

Terwilliger,Jacqueline R ARNP

****

 

 

DATE:

04/02/2018

 

HISTORY OF PRESENT ILLNESS:

A 77-year-old patient of Dr. Lorene Aquino, Dr. Mason, 

presented to the emergency department on 03/30 after coming from the 

doctor's office with increasing dyspnea for the last 2 days, acutely 

worse.  On admission, his BNP was found to be 3500.  EF of 

approximately 30-35%.  Has history of aortic stenosis.  Underwent 

echocardiogram showing EF of 30-35%, diffuse global hypokinesis with 

distinct regional wall motion abnormalities, mild to moderate mitral 

regurgitation, severe aortic valve stenosis, aortic valve area 0.48, 

mean gradient of 53.  He also then underwent cardiac catheterization 

by Dr. East, which showed total occlusion of a nondominant RCA that 

has collateralized and a 50% stenosis of the distal circumflex, PA 

pressures were 45/19 with a mean of 33, wedge pressure of 34.  We were

consulted to evaluate for open aortic valve replacement versus 

candidate for transcatheter aortic valve replacement.  The patient 

apparently was admitted back in February.  At that time, he was 

admitted for severe anemia with a hemoglobin of 5.5.  Also, dizziness 

on exertion and chest tightness.  He had complete workup by GI 

including an endoscopy and colonoscopy, which showed some gastritis, 

possible Lucio esophagitis.  He did have a polyp removed.  He does, 

however, take iron.  He did receive blood transfusion on that 

admission, approximately 3 units of packed RBCs.  Upon discharge, his 

hemoglobin was 8.3.  On this admission, his hemoglobin was 11 and is 

now 9.8.  He has heme-positive stool, but he is also on iron 

supplements.  He did have a peripheral smear on his last admission, 

which showed some numerous echinocytes, rare teardrop cells, rare 

helmet cells, occasional lymphocytes.  He had normal iron studies on 

02/22/2018.  However, his B12 levels were high at 1100.

 

PAST MEDICAL HISTORY:

Includes aortic stenosis, CHF, moderate mitral regurgitation, prior 

catheterization 10 years ago in [place].  He is not aware of the 

results, but he did not get a stent.  Chronic kidney disease, diabetes

mellitus, on oral medication.

 

PAST SURGICAL HISTORY:

Include cataract surgery, EGD, colonoscopy.

 

FAMILY HISTORY:

Positive for CVA in his brother.

 

SOCIAL HISTORY:

The patient , 1 child.  Quit smoking in 1950.

 

REVIEW OF SYSTEMS:

GENERAL:  No night sweats, fever, heat and cold intolerance.

SKIN:  No psoriasis, itching or hives.

HEENT:  No blurred vision, hearing loss.

RESPIRATORY:  Some shortness of breath.

CARDIOVASCULAR:  He has had recent chest discomfort when his 

hemoglobin was very low.  No paroxysmal nocturnal dyspnea.  No 

orthopnea.

GASTROINTESTINAL:  No diarrhea, vomiting.

GENITOURINARY:  No burning, frequency, urgency.

CENTRAL NERVOUS SYSTEM:  No history of TIA, CVA or seizure disorder.

ENDOCRINOLOGY:  Positive for diabetes, on oral medications.

 

PHYSICAL EXAMINATION:

VITAL SIGNS:  Blood pressure 120/60, heart rate is 68, temperature max

98.1.  The patient is sitting up in bed, alert and oriented, in no 

acute distress.

HEENT:  Head is normocephalic, atraumatic.  Pupils are equal and 

reactive.  Oral mucosa pink, moist.  He has significant poor dentition

to his lower gums.  He has got 1 isolated existing tooth and the other

one is lying down.  I did not feel any looseness to the teeth.  He 

needs to have his teeth evaluated by an _____ dentist for possible 

extraction.

NECK:  Supple.  No JVD.

HEART:  Sounds S1, S2, with a grade III/VI systolic murmur best noted 

at the right sternal border.

LUNGS:  Clear to auscultation.  No wheezes, rales or rhonchi.

ABDOMEN:  Soft, nontender.  No masses or organomegaly.

EXTREMITIES:  No cyanosis, clubbing, or edema.

 

LABORATORY AND DIAGNOSTIC DATA:

EKG shows sinus rhythm with some lateral ST abnormality.

 

Lab work shows hemoglobin 9.8, hematocrit of 30, white cell count of 

6, platelet count of 176.  Sodium 141, potassium 3.6, BUN of 22, 

creatinine 1.53.  His BNP is 3500, down from 4300.  Triglycerides 75, 

cholesterol 87, HDL of 44, albumin 3.8.

 

RADIOLOGICAL EXAMS:

Chest x-ray with some right basilar consolidation, possible small 

effusion, some increased vascular congestion.

 

CURRENT MEDICATIONS:

Include Lasix, Coreg.

 

HOME MEDICATIONS:

Include iron tabs, Lipitor, Coreg, Demadex, metformin, lisinopril.

 

IMPRESSION:

This is a very pleasant 77-year-old male of [place] origin, does speak

English, has been having symptoms of shortness of breath, admitted 

with congestive heart failure exacerbation with elevated BNP, 

significant aortic valve stenosis with a valve area of 0.48.  Also, 

some mild to moderate mitral regurgitation.  Underwent cardiac 

catheterization with 50% stenosis of the distal circumflex, total 

occlusion of the nondominant right coronary artery with 

collateralization.  The patient's risk factors and increased morbidity

include chronic anemia with recent multiple blood transfusions, 

ejection fraction of 30%, diabetes mellitus, sedentary lifestyle, poor

dentition.  His STS data shows a risk of mortality of 4.13, 

recommending transcatheter aortic valve replacement; however, also 

requesting that the patient undergo evaluation with dentistry for 

possible extraction of the lower teeth.

 

 

__________________________________ __________________________________

Jacqueline R. Terwilliger, ARNP [facility] H. Meyers, MD JRT/INGE

D: 04/02/2018, 01:28 PM

T: 04/02/2018, 02:09 PM

Visit #: [id number]

Job #: [id number]

## 2018-04-02 NOTE — HHI.GIFU
Subjective


Remarks


Resting up in the chair awake, conversational


Denies any nausea or vomiting


Denies any further dizziness, but did note symptom on admission


History of constipation, acute on chronic.  No BM 4 days


 (Maryam Franz)





Objective


Vitals I&O





Vital Signs








  Date Time  Temp Pulse Resp B/P (MAP) Pulse Ox O2 Delivery O2 Flow Rate FiO2


 


4/2/18 14:00  71      


 


4/2/18 13:00  64      


 


4/2/18 12:00  60      


 


4/2/18 11:00 98.1 70 20 119/63 (81) 98   


 


4/2/18 11:00  68      


 


4/2/18 10:00  70      


 


4/2/18 09:00  64      


 


4/2/18 08:00  64      


 


4/2/18 08:00 98.1 68 20 128/62 (84) 99   


 


4/2/18 07:00  66      


 


4/2/18 03:06  69 16 114/56 (75) 95   


 


4/2/18 03:00  64      


 


4/2/18 02:00  68      


 


4/2/18 01:00  70      


 


4/2/18 00:00  72      


 


4/1/18 23:22  71 16 130/76 (94) 95   


 


4/1/18 23:00  68      


 


4/1/18 22:00  68      


 


4/1/18 21:00  70      


 


4/1/18 20:38 98.7 79 16 122/83 (96) 96   


 


4/1/18 20:35        21


 


4/1/18 20:00  76      


 


4/1/18 19:00  76      


 


4/1/18 18:00  68      


 


4/1/18 17:00  75      


 


4/1/18 16:00  78      


 


4/1/18 15:56     97   21


 


4/1/18 15:02 97.8 75 16 127/76 (93) 94   


 


4/1/18 15:00  84      














I/O      


 


 4/1/18 4/1/18 4/1/18 4/2/18 4/2/18 4/2/18





 07:00 15:00 23:00 07:00 15:00 23:00


 


Intake Total 20 ml  480 ml 480 ml  


 


Output Total 1860 ml  450 ml 1250 ml  


 


Balance -1840 ml  30 ml -770 ml  


 


      


 


Intake Oral 20 ml  480 ml 480 ml  


 


Output Urine Total 1860 ml  450 ml 1250 ml  


 


# Bowel Movements    0  








Laboratory





Laboratory Tests








Test


  4/1/18


15:25 4/2/18


06:33


 


Hemoglobin 9.6  9.8 


 


Hematocrit 30.0  30.7 


 


White Blood Count  6.0 


 


Red Blood Count  3.62 


 


Mean Corpuscular Volume  85.0 


 


Mean Corpuscular Hemoglobin  27.1 


 


Mean Corpuscular Hemoglobin


Concent 


  31.9 


 


 


Red Cell Distribution Width  24.0 


 


Platelet Count  176 


 


Mean Platelet Volume  10.0 


 


Neutrophils (%) (Auto)  73.5 


 


Lymphocytes (%) (Auto)  14.3 


 


Monocytes (%) (Auto)  8.7 


 


Eosinophils (%) (Auto)  2.6 


 


Basophils (%) (Auto)  0.9 


 


Neutrophils # (Auto)  4.4 


 


Lymphocytes # (Auto)  0.9 


 


Monocytes # (Auto)  0.5 


 


Eosinophils # (Auto)  0.2 


 


Basophils # (Auto)  0.1 


 


CBC Comment  DIFF FINAL 


 


Differential Comment   


 


Blood Urea Nitrogen  22 


 


Creatinine  1.53 


 


Random Glucose  89 


 


Calcium Level  8.7 


 


Sodium Level  141 


 


Potassium Level  3.6 


 


Chloride Level  105 


 


Carbon Dioxide Level  30.6 


 


Anion Gap  5 


 


Estimat Glomerular Filtration


Rate 


  44 


 


 


B-Type Natriuretic Peptide  3506 


 


Albumin  3.8 


 


Triglycerides Level  75 


 


Cholesterol Level  87 


 


LDL Cholesterol  28 


 


HDL Cholesterol  44.0 


 


Cholesterol/HDL Ratio  1.97 














 Date/Time


Source Procedure


Growth Status


 


 


 3/31/18 21:45


Stool Stool Stool Occult Blood (PRANAV) - Final


HEMOCCULT POSITIVE Complete








Imaging





Last Impressions








Chest X-Ray 3/30/18 1503 Signed





Impressions: 





 Service Date/Time:  Friday, March 30, 2018 15:32 - CONCLUSION:  1. Right 

basilar 





 consolidation with possible small effusion. 2. Mild atelectatic changes of the 





 left hemidiaphragm. 3. Slight interstitial prominence which could represent 

some 





 degree of vascular congestion or volume overload. Heart size is borderline 





 prominent.     Gal Mayfield MD 








Physical Exam


HEENT: Pupils round and reactive to light; normocephalic; atraumatic; no 

jaundice.  Speech is understandable


NECK: Neck is supple, 


CHEST:  Chest is clear now audible rhonchi


CARDIAC:  Regular rate and rhythm S1-S2


ABDOMEN:  Round, Soft, nondistended, nontender; no hepatosplenomegaly; bowel 

sounds are present in all four quadrants.


EXTREMITIES: Moves all extremities with purpose


SKIN:  Normal; no rash; no jaundice.


CNS:  No focal deficits; alert and oriented times three.


 (Osei,Maryam M. ARNP)





Assessment and Plan


Plan


ASSESSMENT


- heme pos stool - questionable dark stools, takes iron.  poss UGI/sm bowel 

bleed? recent EGD and colonoscopy 2/24/18 found no bleeding- gastritis, polyps.


    path adenomatous polyp.  He says he has CE scheduled with [facility].  


- anemia - 2/2 above - hgb dropped from 11 to 9.3 this could be multifactorial.


- severe aortic stenosis, s/p catheterization and angiography and per 

cardiology irina need TAVR vs open heart surgery


4/2/2018, HGB 9.8, no obvious bleeding.  Patient did note dark stools but 

contributed to iron supplement.  Patient currently denies any nausea, vomiting, 

no dizziness.  Current acute on chronic problem is constipation.  No BM in 4 

days.  Took milk of magnesia today, but explained if this was ineffective we 

would add medicine for him to take this p.m.





FERNANDO


- Diet heart healthy.  Encouraged by mouth fluids especially water for hydration

/constipation


- Protonix


- Anti-emetics as needed


- We'll start lactulose daily, patient currently receiving pain meds and may 

need outpatient Linzess if MiraLAX or lactulose ineffective


- capsule endoscopy as outpt


- monitor labs, intake and output


- notify GI of active bleed, transfuse as needed


- further recs to follow





pt seen by myself and Dr Haq and this note is on his behalf


 (Maryam Franz)


Physician Comments


Seen and examined with JS, no active bleeding seen. Proceed with capsule 

endoscopy as outpatient as planned. Monitor labs.


 (Chip Haq MD)











Maryam Franz Apr 2, 2018 14:21


Chip Haq MD Apr 2, 2018 15:51

## 2018-04-03 VITALS
TEMPERATURE: 98 F | SYSTOLIC BLOOD PRESSURE: 123 MMHG | HEART RATE: 67 BPM | OXYGEN SATURATION: 96 % | RESPIRATION RATE: 18 BRPM | DIASTOLIC BLOOD PRESSURE: 62 MMHG

## 2018-04-03 VITALS
OXYGEN SATURATION: 97 % | RESPIRATION RATE: 18 BRPM | DIASTOLIC BLOOD PRESSURE: 60 MMHG | HEART RATE: 70 BPM | TEMPERATURE: 97.8 F | SYSTOLIC BLOOD PRESSURE: 110 MMHG

## 2018-04-03 VITALS
TEMPERATURE: 98.3 F | OXYGEN SATURATION: 94 % | RESPIRATION RATE: 18 BRPM | SYSTOLIC BLOOD PRESSURE: 101 MMHG | HEART RATE: 70 BPM | DIASTOLIC BLOOD PRESSURE: 58 MMHG

## 2018-04-03 VITALS — HEART RATE: 76 BPM

## 2018-04-03 VITALS — HEART RATE: 62 BPM

## 2018-04-03 VITALS — HEART RATE: 68 BPM

## 2018-04-03 VITALS
OXYGEN SATURATION: 97 % | SYSTOLIC BLOOD PRESSURE: 126 MMHG | HEART RATE: 66 BPM | RESPIRATION RATE: 19 BRPM | DIASTOLIC BLOOD PRESSURE: 64 MMHG | TEMPERATURE: 98.4 F

## 2018-04-03 VITALS — HEART RATE: 71 BPM

## 2018-04-03 VITALS — HEART RATE: 77 BPM

## 2018-04-03 VITALS — HEART RATE: 70 BPM

## 2018-04-03 VITALS
DIASTOLIC BLOOD PRESSURE: 59 MMHG | TEMPERATURE: 97.9 F | RESPIRATION RATE: 20 BRPM | SYSTOLIC BLOOD PRESSURE: 115 MMHG | HEART RATE: 71 BPM | OXYGEN SATURATION: 97 %

## 2018-04-03 VITALS
TEMPERATURE: 98.1 F | OXYGEN SATURATION: 96 % | HEART RATE: 83 BPM | SYSTOLIC BLOOD PRESSURE: 117 MMHG | RESPIRATION RATE: 18 BRPM | DIASTOLIC BLOOD PRESSURE: 72 MMHG

## 2018-04-03 VITALS
TEMPERATURE: 97.7 F | SYSTOLIC BLOOD PRESSURE: 104 MMHG | HEART RATE: 69 BPM | OXYGEN SATURATION: 94 % | RESPIRATION RATE: 18 BRPM | DIASTOLIC BLOOD PRESSURE: 51 MMHG

## 2018-04-03 VITALS — HEART RATE: 67 BPM

## 2018-04-03 VITALS — HEART RATE: 72 BPM

## 2018-04-03 VITALS — HEART RATE: 66 BPM

## 2018-04-03 VITALS — HEART RATE: 64 BPM

## 2018-04-03 VITALS — OXYGEN SATURATION: 98 %

## 2018-04-03 VITALS — HEART RATE: 82 BPM

## 2018-04-03 VITALS — HEART RATE: 78 BPM

## 2018-04-03 VITALS — HEART RATE: 80 BPM

## 2018-04-03 LAB
BUN SERPL-MCNC: 23 MG/DL (ref 7–18)
CALCIUM SERPL-MCNC: 8.9 MG/DL (ref 8.5–10.1)
CHLORIDE SERPL-SCNC: 101 MEQ/L (ref 98–107)
CREAT SERPL-MCNC: 1.62 MG/DL (ref 0.6–1.3)
ERYTHROCYTE [DISTWIDTH] IN BLOOD BY AUTOMATED COUNT: 23.9 % (ref 11.6–17.2)
GFR SERPLBLD BASED ON 1.73 SQ M-ARVRAT: 41 ML/MIN (ref 89–?)
GLUCOSE SERPL-MCNC: 97 MG/DL (ref 74–106)
HCO3 BLD-SCNC: 33.8 MEQ/L (ref 21–32)
HCT VFR BLD CALC: 31.1 % (ref 39–51)
HGB BLD-MCNC: 10 GM/DL (ref 13–17)
MCH RBC QN AUTO: 27.4 PG (ref 27–34)
MCHC RBC AUTO-ENTMCNC: 32.3 % (ref 32–36)
MCV RBC AUTO: 84.8 FL (ref 80–100)
PLATELET # BLD: 194 TH/MM3 (ref 150–450)
PMV BLD AUTO: 9.6 FL (ref 7–11)
RBC # BLD AUTO: 3.67 MIL/MM3 (ref 4.5–5.9)
SODIUM SERPL-SCNC: 140 MEQ/L (ref 136–145)
WBC # BLD AUTO: 6.4 TH/MM3 (ref 4–11)

## 2018-04-03 RX ADMIN — FERROUS SULFATE TAB 325 MG (65 MG ELEMENTAL FE) SCH MG: 325 (65 FE) TAB at 09:06

## 2018-04-03 RX ADMIN — Medication SCH ML: at 20:59

## 2018-04-03 RX ADMIN — STANDARDIZED SENNA CONCENTRATE AND DOCUSATE SODIUM SCH TAB: 8.6; 5 TABLET, FILM COATED ORAL at 09:07

## 2018-04-03 RX ADMIN — CARVEDILOL SCH MG: 3.12 TABLET, FILM COATED ORAL at 09:07

## 2018-04-03 RX ADMIN — INSULIN ASPART SCH: 100 INJECTION, SOLUTION INTRAVENOUS; SUBCUTANEOUS at 18:13

## 2018-04-03 RX ADMIN — WATER SCH ML: 1 IRRIGANT IRRIGATION at 09:06

## 2018-04-03 RX ADMIN — CYANOCOBALAMIN TAB 1000 MCG SCH MCG: 1000 TAB at 09:06

## 2018-04-03 RX ADMIN — FUROSEMIDE SCH MG: 10 INJECTION, SOLUTION INTRAMUSCULAR; INTRAVENOUS at 06:40

## 2018-04-03 RX ADMIN — ASPIRIN SCH MG: 81 TABLET ORAL at 09:07

## 2018-04-03 RX ADMIN — ATORVASTATIN CALCIUM SCH MG: 40 TABLET, FILM COATED ORAL at 09:06

## 2018-04-03 RX ADMIN — STANDARDIZED SENNA CONCENTRATE AND DOCUSATE SODIUM SCH TAB: 8.6; 5 TABLET, FILM COATED ORAL at 20:59

## 2018-04-03 RX ADMIN — INSULIN ASPART SCH: 100 INJECTION, SOLUTION INTRAVENOUS; SUBCUTANEOUS at 13:28

## 2018-04-03 RX ADMIN — CARVEDILOL SCH MG: 3.12 TABLET, FILM COATED ORAL at 20:59

## 2018-04-03 RX ADMIN — PANTOPRAZOLE SODIUM SCH MG: 40 INJECTION, POWDER, FOR SOLUTION INTRAVENOUS at 03:22

## 2018-04-03 RX ADMIN — INSULIN ASPART SCH: 100 INJECTION, SOLUTION INTRAVENOUS; SUBCUTANEOUS at 20:24

## 2018-04-03 RX ADMIN — INSULIN ASPART SCH: 100 INJECTION, SOLUTION INTRAVENOUS; SUBCUTANEOUS at 09:00

## 2018-04-03 RX ADMIN — Medication SCH ML: at 15:31

## 2018-04-03 RX ADMIN — FUROSEMIDE SCH MG: 10 INJECTION, SOLUTION INTRAMUSCULAR; INTRAVENOUS at 18:31

## 2018-04-03 RX ADMIN — PANTOPRAZOLE SODIUM SCH MG: 40 INJECTION, POWDER, FOR SOLUTION INTRAVENOUS at 15:31

## 2018-04-03 NOTE — HHI.GIFU
Subjective


Remarks


Pt resting in bed


No BM today, states 2 yesterday, black secondary to iron supplements


No GI complaints at this time


Good appetite 


 (Arline Arteaga)





Objective


Vitals I&O





Vital Signs








  Date Time  Temp Pulse Resp B/P (MAP) Pulse Ox O2 Delivery O2 Flow Rate FiO2


 


4/3/18 11:30 97.9 71 20 115/59 (77) 97   


 


4/3/18 11:00  67      


 


4/3/18 10:00  70      


 


4/3/18 09:13     98   21


 


4/3/18 09:00  62      


 


4/3/18 08:00  68      


 


4/3/18 07:30 98.4 66 19 126/64 (84) 97   


 


4/3/18 07:30 98.4 66 19 126/64 (84) 97   


 


4/3/18 07:00  62      


 


4/3/18 04:00 98.0 67 18 123/62 (82) 96   


 


4/3/18 02:00  64      


 


4/3/18 01:00  70      


 


4/3/18 00:00 97.7 69 18 104/51 (68) 94   


 


4/3/18 00:00  70      


 


4/2/18 23:00  74      


 


4/2/18 22:00  74      


 


4/2/18 21:22     95   21


 


4/2/18 21:00  76      


 


4/2/18 20:00  77      


 


4/2/18 20:00 98.3 76 18 109/68 (82) 98   


 


4/2/18 18:00  72      


 


4/2/18 17:00  67      


 


4/2/18 16:00  71      


 


4/2/18 15:00 98.0 74 20 115/63 (80) 94   


 


4/2/18 15:00  68      


 


4/2/18 14:00  71      














I/O      


 


 4/2/18 4/2/18 4/2/18 4/3/18 4/3/18 4/3/18





 07:00 15:00 23:00 07:00 15:00 23:00


 


Intake Total 480 ml  960 ml 360 ml  


 


Output Total 1250 ml  700 ml   


 


Balance -770 ml  260 ml 360 ml  


 


      


 


Intake Oral 480 ml  960 ml 360 ml  


 


Output Urine Total 1250 ml  700 ml   


 


# Voids    3  


 


# Bowel Movements 0  1 1  








Laboratory





Laboratory Tests








Test


  4/3/18


05:45 4/3/18


05:46


 


Blood Urea Nitrogen 23  


 


Creatinine 1.62  


 


Random Glucose 97  


 


Calcium Level 8.9  


 


Sodium Level 140  


 


Potassium Level 3.8  


 


Chloride Level 101  


 


Carbon Dioxide Level 33.8  


 


Anion Gap 5  


 


Estimat Glomerular Filtration


Rate 41 


  


 


 


White Blood Count  6.4 


 


Red Blood Count  3.67 


 


Hemoglobin  10.0 


 


Hematocrit  31.1 


 


Mean Corpuscular Volume  84.8 


 


Mean Corpuscular Hemoglobin  27.4 


 


Mean Corpuscular Hemoglobin


Concent 


  32.3 


 


 


Red Cell Distribution Width  23.9 


 


Platelet Count  194 


 


Mean Platelet Volume  9.6 














 Date/Time


Source Procedure


Growth Status


 


 


 3/31/18 21:45


Stool Stool Stool Occult Blood (PRANAV) - Final


HEMOCCULT POSITIVE Complete








Imaging





Last Impressions








Chest X-Ray 3/30/18 1503 Signed





Impressions: 





 Service Date/Time:  Friday, March 30, 2018 15:32 - CONCLUSION:  1. Right 

basilar 





 consolidation with possible small effusion. 2. Mild atelectatic changes of the 





 left hemidiaphragm. 3. Slight interstitial prominence which could represent 

some 





 degree of vascular congestion or volume overload. Heart size is borderline 





 prominent.     Gal Mayfield MD 








Physical Exam


HEENT: Normocephalic; atraumatic


CHEST:  Even/unlabored 


CARDIAC:  RRR


ABDOMEN:  Round, soft, nontender; no hepatosplenomegaly; bowel sounds active


SKIN:  Normal; no rash; no jaundice.


CNS:  No focal deficits; alert and oriented times three.


 ([facility])





Assessment and Plan


Plan


ASSESSMENT


- heme pos stool - questionable dark stools, takes iron.  poss UGI/sm bowel 

bleed? recent EGD and colonoscopy 2/24/18 found no bleeding- gastritis, polyps.


    path adenomatous polyp.  He says he has CE scheduled with [facility].  


- anemia - 2/2 above - hgb dropped from 11 to 9.3 this could be multifactorial.


- severe aortic stenosis, s/p catheterization and angiography and per 

cardiology irina need TAVR vs open heart surgery


4/2/2018, HGB 9.8, no obvious bleeding.  Patient did note dark stools but 

contributed to iron supplement.  Patient currently denies any nausea, vomiting, 

no dizziness.  Current acute on chronic problem is constipation.  No BM in 4 

days.  Took milk of magnesia today, but explained if this was ineffective we 

would add medicine for him to take this p.m.





(4/3) Pt reports no BM yet today, 2 yesterday, remain black secondary to iron 

supplement.


  H/H remains stable. Pt with no GI complaints at this time, denies nausea, 

vomiting. Tolerating


  PO.  No indication for repeat GI procedures at this time. Cardiology following

, awaiting 


  surgical eval to decide SAVR vs TAVR.  





FERNANDO


- LESLY


- Protonix


- Capsule endoscopy outpatient


- GI will sign off, please reconsult as needed


- Have pt follow up with GI after DC for CE





Patient has been seen and examined by myself and Dr. Haq and this note is 

written on his behalf 


 (Arline Arteaga)


Physician Comments


Seen and examined with JS, no bleeding reported. H/H stable. Outpatient 

capsule endoscopy planned. Can start on plavix as recommended by Cardiology. 

Continue iron supplementation.GI fu upon dc. Thank you


 (Chip Haq MD)











Arline Arteaga Apr 3, 2018 13:07


Chip Haq MD Apr 3, 2018 14:36

## 2018-04-03 NOTE — MB
cc:

Candace Vidales MD,Lázaro East,Jamey Mason,Olivier Forrest MD, MD

****

 

 

DATE:

04/03/2018

 

REFERRING PROVIDER:

Dr. Jamey East.

 

REASON FOR CONSULTATION:

Second opinion regarding TAVR.

 

HISTORY OF PRESENT ILLNESS:

Mr. Cho is a very pleasant 77-year-old gentleman with a known history of 

aortic stenosis, who presented to the Emergency Department with complaints 

of progressive dyspnea on exertion for the past 2-3 days.  The patient was 
admitted 

and has undergone further workup including an echocardiogram which has revealed 

an ejection fraction of 30-35% with severe aortic stenosis with an aortic valve 

area of 0.4 cm2 and aortic valve mean gradient of 53 mmHg.  The 

patient was also noted to be quite anemic and has undergone GI workup 

as well as transfusion.  Because of the underlying aortic stenosis, 

Dr. Baxter was consulted for surgical input and it is his 

recommendation that the patient will maximally benefit from TAVR 

procedure.  I am now being consulted for a second surgical opinion.

 

At the present time, the patient is hemodynamically stable and without

decompensation, lying in bed with supplemental oxygen therapy.  Denies

any progressive shortness of breath at rest.

 

Denies any chest pain as well.

 

PAST MEDICAL HISTORY:

1.  Significant for aortic stenosis as described above.

2.  Congestive heart failure.

3.  Coronary artery disease.

4.  Chronic kidney disease.

5.  Diabetes mellitus.

6.  History of moderate mitral regurgitation.

 

PAST SURGICAL HISTORY:

1.  Remarkable cataract surgery.

2.  EGD.

3.  Colonoscopy.

 

SOCIAL HISTORY:

Remarkable for smoking in the past, which he quit approximately 6 

years ago.

 

FAMILY HISTORY:

Significant for stroke in the brother.

 

REVIEW OF SYSTEMS:

As above.  All other parameters are negative.

 

PHYSICAL EXAMINATION:

GENERAL:  On physical examination today, he is 170 cm tall, weighs 79 

kilos.

VITAL SIGNS:  Blood pressure is 110/60 with a heart rate of 67 which 

is regular, respiratory rate is 18 and he is afebrile.

HEENT:  Normocephalic, atraumatic.  Pupils round and reactive.  

Extraocular muscles intact.

NECK:  No cervical lymphadenopathy, carotid bruits, or JVD.

HEART:  Regular rate and rhythm.  Normal S1, S2, with a IV/VI systolic

ejection murmur at the right parasternal border.

LUNGS:  Clear to auscultation bilaterally with good air exchange.

ABDOMEN:  Soft, nontender, and nondistended.  Normoactive bowel 

sounds.  No hepatosplenomegaly.

EXTREMITIES:  Bilateral lower extremity pulses are intact without 

cyanosis, clubbing or edema.

NEUROLOGIC:  Intact with no focal deficit.

 

IMPRESSION:

1.  Critical aortic stenosis, symptomatic.

2.  Cardiomyopathy with severe left ventricular dysfunction.

3.  Coronary artery disease.

4.  Gastrointestinal bleed.

5.  Diabetes mellitus.

6.  Hypertension.

7.  History of smoking.

 

PLAN:

The clinical, echo and angiographic findings were discussed in detail 

with the patient and his wife today.  I agree with Dr. Baxter that he 

will be best served from TAVR procedure for the treatment of 

his underlying aortic stenosis.  Given his advanced age, significant 

medical comorbidities and severe left ventricular dysfunction, this 

will be the ideal therapy versus surgical therapy. The only other concern

is that he claims he was on Plavix in the past but because of GI bleeding

he stopped taking it. He will need a trial of Plavix again prior to his TAVR

to determine if his bleeding will recur, pending GI approval.

 

Thank you for allowing me participate with this patient.

 

 

__________________________________

MD SAMMY Tang/AYDEN

D: 04/03/2018, 05:49 PM

T: 04/03/2018, 06:40 PM

Visit #: [id number]

Job #: [id number]

MARSHALL

## 2018-04-03 NOTE — HHI.PR
Subjective


Remarks


Patient reports he is feeling ok. No new issues. CT surgery consulted for 

second opinion.





Objective


Vitals





Vital Signs








  Date Time  Temp Pulse Resp B/P (MAP) Pulse Ox O2 Delivery O2 Flow Rate FiO2


 


4/3/18 11:00  67      


 


4/3/18 10:00  70      


 


4/3/18 09:13     98   21


 


4/3/18 09:00  62      


 


4/3/18 08:00  68      


 


4/3/18 07:30 98.4 66 19 126/64 (84) 97   


 


4/3/18 07:00  62      


 


4/3/18 04:00 98.0 67 18 123/62 (82) 96   


 


4/3/18 02:00  64      


 


4/3/18 01:00  70      


 


4/3/18 00:00 97.7 69 18 104/51 (68) 94   


 


4/3/18 00:00  70      


 


4/2/18 23:00  74      


 


4/2/18 22:00  74      


 


4/2/18 21:22     95   21


 


4/2/18 21:00  76      


 


4/2/18 20:00  77      


 


4/2/18 20:00 98.3 76 18 109/68 (82) 98   


 


4/2/18 18:00  72      


 


4/2/18 17:00  67      


 


4/2/18 16:00  71      


 


4/2/18 15:00 98.0 74 20 115/63 (80) 94   


 


4/2/18 15:00  68      


 


4/2/18 14:00  71      


 


4/2/18 13:00  64      


 


4/2/18 12:00  60      














I/O      


 


 4/2/18 4/2/18 4/2/18 4/3/18 4/3/18 4/3/18





 07:00 15:00 23:00 07:00 15:00 23:00


 


Intake Total 480 ml  960 ml 360 ml  


 


Output Total 1250 ml  700 ml   


 


Balance -770 ml  260 ml 360 ml  


 


      


 


Intake Oral 480 ml  960 ml 360 ml  


 


Output Urine Total 1250 ml  700 ml   


 


# Voids    3  


 


# Bowel Movements 0  1 1  








Result Diagram:  


4/3/18 0546                                                                    

            4/3/18 0545





Objective Remarks


GENERAL: This is a well-nourished, well-developed patient, in no apparent 

distress.


CARDIOVASCULAR: Normal rate and regular rhythm.  3 out of 6 GEORGES murmur best 

heard over the right upper sternal border


RESPIRATORY: Good respiratory efforts. Breath sounds equal and clear to 

auscultation bilaterally.


GASTROINTESTINAL: Abdomen soft, non-tender, non-distended. Normal active bowel 

sounds


MUSCULOSKELETAL: Extremities without cyanosis, or edema.


NEURO:  Alert & Oriented x4 to person, place, time, situation.  Moves all ext x4


PSYCH: Appropriate mood and affect.





A/P


Assessment and Plan


77-year-old male with hypertension, dyslipidemia, aortic stenosis, CHF, CAD, CKD

, gout and diabetes admitted with three-day history of worsening shortness of 

breath and bilateral lower extremity edema.





Acute on chronic systolic CHF, decompensated


Last echo 2/11/18 with EF 45-50%, mild LVH, severe aortic stenosis


   -CXR shows right basilar consolidation/effusion with some atelectatic 

changes in the left base


   -BNP 4996


   -Continue on IV Lasix for diuresis


   -Monitor I&Os


   -Fluid/salt restricted diet


   -CHF teaching


   -Continue on Coreg


   -continue to monitor volume status


   -Continue supplemental oxygen to maintain O2 sats above 92%, patient weaned 

off to room air





CAD, ?previous stenting


Severe aortic stenosis


   -Cardiology following, appreciate assistance.  Repeat echocardiogram 

confirms severity of AS.  Status post heart catheterization, recommend medical 

management and CT surgery consulted to consider TAVR   


   -Dr. Baxter evaluated the patient. Sobeida Baez consulted for second opinion. 





GIB


History of recent GI bleed requiring transfusion 3 units PRBCs


   -s/p EGD/colonoscopy 2/24/18, irregular z line, mild gastritis, multiple 

polyps


   -no significant source of bleeding identified


   -Hemoccult positive this admission


   -hold Heparin, started on Protonix


   -H&H stable and improved.





Hypertension, chronic, controlled


   -Continue on Coreg but at decreased dose with holding parameters


   -continue to monitor BP and adjust accordingly





JOSIE on Suspected CKD stage III


   -baseline creatinine likely around 1.3-1.4


   -creatinine up while on IV Lasix. Transition to oral Lasix. 


   -avoid nephrotoxic agents


   -continue to monitor renal indices





Diabetes


   -Continue to hold metformin


   -Accu-Cheks and insulin sliding scale


   -Hemoglobin A1C 4.8





Iron deficiency anemia, secondary to GIB


   -Resume home dose of iron daily


   -Hemoglobin stable. 





Gout


   -no exacerbation


   -monitor





DVT prophylaxis


   -sq Heparin on hold secondary to ?GIB.  Encourage ambulation


Discharge Planning


Transition to oral meds. Final TAVR plans per CT surgery.











Jasson Burris MD Apr 3, 2018 11:41

## 2018-04-03 NOTE — PD.CARD.PN
Subjective


Subjective Remarks


Pt without complaints





Objective


Medications





Current Medications








 Medications


  (Trade)  Dose


 Ordered  Sig/Ramu


 Route  Start Time


 Stop Time Status Last Admin


 


  (Heparin Inj)  5,000 units  Q8H


 SQ  3/30/18 17:30


   Future Hold 4/1/18 01:36


 


 


  (Norco  5-325 Mg)  1 tab  Q4H  PRN


 PO  3/30/18 17:30


     


 


 


  (Norco  7.5-325


 Mg)  1 tab  Q4H  PRN


 PO  3/30/18 17:30


     


 


 


  (Morphine Inj)  4 mg  Q3H  PRN


 IV PUSH  3/30/18 17:30


     


 


 


  (Narcan Inj)  0.4 mg  UNSCH  PRN


 IV PUSH  3/30/18 17:30


     


 


 


  (Arielle-Colace)  1 tab  BID


 PO  3/30/18 21:00


    4/2/18 09:00


 


 


  (Milk Of


 Magnesia Liq)  30 ml  Q12H  PRN


 PO  3/30/18 17:30


    4/2/18 13:06


 


 


  (Senokot)  17.2 mg  Q12H  PRN


 PO  3/30/18 17:30


     


 


 


  (Lasix Inj)  40 mg  DAILY@0700,1900


 IV PUSH  3/31/18 07:00


    4/3/18 06:40


 


 


  (Lipitor)  40 mg  DAILY


 PO  3/31/18 09:00


    4/2/18 09:00


 


 


  (Vitamin B12)  1,000 mcg  DAILY


 PO  3/31/18 09:00


    4/2/18 09:00


 


 


  (Ferrous Sulfate)  325 mg  DAILY


 PO  3/31/18 09:00


    4/2/18 09:00


 


 


  (Coreg)  3.125 mg  Q12HR


 PO  3/31/18 09:00


    4/2/18 21:27


 


 


  (D50w (Vial) Inj)  50 ml  UNSCH  PRN


 IV PUSH  3/31/18 14:00


     


 


 


  (Glucagon Inj)  1 mg  UNSCH  PRN


 OTHER  3/31/18 14:00


     


 


 


  (NovoLOG


 SUPPLEMENTAL


 SCALE)  1  ACHS SLIDING  SCALE


 SQ  3/31/18 17:00


     


 


 


  (Protonix Inj)  40 mg  Q12H


 IV PUSH  4/1/18 02:00


    4/3/18 03:22


 


 


  (Benadryl)  25 mg  ON  CALL


 PO  4/1/18 09:00


 4/5/18 08:59  4/1/18 09:23


 


 


  (Valium)  5 mg  ON  CALL


 PO  4/1/18 09:00


 4/5/18 08:59  4/1/18 09:23


 


 


  (NS Flush)  2 ml  BID


 IV FLUSH  4/1/18 21:00


    4/2/18 21:27


 


 


  (NS Flush)  2 ml  UNSCH  PRN


 IV FLUSH  4/1/18 10:45


     


 


 


  (Zofran Inj)  4 mg  Q4H  PRN


 IV PUSH  4/1/18 10:45


     


 


 


  (Ecotrin Ec)  81 mg  DAILY


 PO  4/2/18 09:00


    4/2/18 09:00


 


 


  (Lactulose Liq)  30 ml  DAILY


 PO  4/2/18 15:30


    4/2/18 16:00


 








Vital Signs / I&O





Vital Signs








  Date Time  Temp Pulse Resp B/P (MAP) Pulse Ox O2 Delivery O2 Flow Rate FiO2


 


4/3/18 07:30 98.4 66 19 126/64 (84) 97   


 


4/3/18 04:00 98.0 67 18 123/62 (82) 96   


 


4/3/18 02:00  64      


 


4/3/18 01:00  70      


 


4/3/18 00:00 97.7 69 18 104/51 (68) 94   


 


4/3/18 00:00  70      


 


4/2/18 23:00  74      


 


4/2/18 22:00  74      


 


4/2/18 21:22     95   21


 


4/2/18 21:00  76      


 


4/2/18 20:00  77      


 


4/2/18 20:00 98.3 76 18 109/68 (82) 98   


 


4/2/18 18:00  72      


 


4/2/18 17:00  67      


 


4/2/18 16:00  71      


 


4/2/18 15:00 98.0 74 20 115/63 (80) 94   


 


4/2/18 15:00  68      


 


4/2/18 14:00  71      


 


4/2/18 13:00  64      


 


4/2/18 12:00  60      


 


4/2/18 11:00 98.1 70 20 119/63 (81) 98   


 


4/2/18 11:00  68      


 


4/2/18 10:00  70      


 


4/2/18 09:00  64      














I/O      


 


 4/2/18 4/2/18 4/2/18 4/3/18 4/3/18 4/3/18





 07:00 15:00 23:00 07:00 15:00 23:00


 


Intake Total 480 ml  960 ml 360 ml  


 


Output Total 1250 ml  700 ml   


 


Balance -770 ml  260 ml 360 ml  


 


      


 


Intake Oral 480 ml  960 ml 360 ml  


 


Output Urine Total 1250 ml  700 ml   


 


# Voids    3  


 


# Bowel Movements 0  1 1  








Physical Exam


GENERAL: Well developed, well nourished. No acute distress.


HEENT: Jugular venous pressure is normal. 


CHEST: Lungs clear to auscultation bilaterally. Unlabored respiratory effort.


CARDIAC: Regular rate and rhythm, harsh GEORGES.


ABDOMEN: Soft, Bowel sounds present.


EXTREMITIES: No clubbing, cyanosis, or edema.


Laboratory





Laboratory Tests








Test


  4/3/18


05:45 4/3/18


05:46


 


Blood Urea Nitrogen 23 MG/DL  


 


Creatinine 1.62 MG/DL  


 


Random Glucose 97 MG/DL  


 


Calcium Level 8.9 MG/DL  


 


Sodium Level 140 MEQ/L  


 


Potassium Level 3.8 MEQ/L  


 


Chloride Level 101 MEQ/L  


 


Carbon Dioxide Level 33.8 MEQ/L  


 


Anion Gap 5 MEQ/L  


 


Estimat Glomerular Filtration


Rate 41 ML/MIN 


  


 


 


White Blood Count  6.4 TH/MM3 


 


Red Blood Count  3.67 MIL/MM3 


 


Hemoglobin  10.0 GM/DL 


 


Hematocrit  31.1 % 


 


Mean Corpuscular Volume  84.8 FL 


 


Mean Corpuscular Hemoglobin  27.4 PG 


 


Mean Corpuscular Hemoglobin


Concent 


  32.3 % 


 


 


Red Cell Distribution Width  23.9 % 


 


Platelet Count  194 TH/MM3 


 


Mean Platelet Volume  9.6 FL 











Assessment and Plan


Problem List:  


(1) Aortic stenosis


ICD Codes:  I35.0 - Nonrheumatic aortic (valve) stenosis


Status:  Chronic


Plan:  severe AS, await surgical eval for SAVR vs TAVR 





(2) CAD (coronary artery disease)


ICD Codes:  I25.10 - Atherosclerotic heart disease of native coronary artery 

without angina pectoris


Status:  Chronic


(3) Diabetes mellitus


ICD Codes:  E11.9 - Type 2 diabetes mellitus without complications


(4) CHF (congestive heart failure)


ICD Codes:  I50.9 - Heart failure, unspecified


Status:  Acute


Plan:  stable, continue present meds





(5) Ischemic cardiomyopathy


ICD Codes:  I25.5 - Ischemic cardiomyopathy


(6) JOSIE (acute kidney injury)


ICD Codes:  N17.9 - Acute kidney failure, unspecified


(7) Severe anemia


ICD Codes:  D64.9 - Anemia, unspecified


Status:  Acute





Problem Qualifiers





(1) CHF (congestive heart failure):  


Qualified Codes:  I50.9 - Heart failure, unspecified








Radha Mason MD Apr 3, 2018 08:21

## 2018-04-04 VITALS
HEART RATE: 66 BPM | RESPIRATION RATE: 16 BRPM | TEMPERATURE: 97.5 F | SYSTOLIC BLOOD PRESSURE: 120 MMHG | DIASTOLIC BLOOD PRESSURE: 63 MMHG | OXYGEN SATURATION: 96 %

## 2018-04-04 VITALS
RESPIRATION RATE: 18 BRPM | HEART RATE: 74 BPM | SYSTOLIC BLOOD PRESSURE: 121 MMHG | OXYGEN SATURATION: 97 % | TEMPERATURE: 98.1 F | DIASTOLIC BLOOD PRESSURE: 63 MMHG

## 2018-04-04 VITALS — HEART RATE: 69 BPM

## 2018-04-04 VITALS — HEART RATE: 70 BPM

## 2018-04-04 VITALS — HEART RATE: 76 BPM

## 2018-04-04 VITALS
TEMPERATURE: 98 F | HEART RATE: 73 BPM | RESPIRATION RATE: 17 BRPM | DIASTOLIC BLOOD PRESSURE: 65 MMHG | OXYGEN SATURATION: 97 % | SYSTOLIC BLOOD PRESSURE: 119 MMHG

## 2018-04-04 VITALS
HEART RATE: 63 BPM | SYSTOLIC BLOOD PRESSURE: 122 MMHG | RESPIRATION RATE: 18 BRPM | TEMPERATURE: 98.7 F | OXYGEN SATURATION: 98 % | DIASTOLIC BLOOD PRESSURE: 64 MMHG

## 2018-04-04 VITALS
DIASTOLIC BLOOD PRESSURE: 71 MMHG | SYSTOLIC BLOOD PRESSURE: 135 MMHG | OXYGEN SATURATION: 97 % | RESPIRATION RATE: 18 BRPM | HEART RATE: 72 BPM | TEMPERATURE: 97.7 F

## 2018-04-04 VITALS
OXYGEN SATURATION: 97 % | RESPIRATION RATE: 18 BRPM | TEMPERATURE: 98.3 F | SYSTOLIC BLOOD PRESSURE: 123 MMHG | DIASTOLIC BLOOD PRESSURE: 64 MMHG | HEART RATE: 73 BPM

## 2018-04-04 VITALS — HEART RATE: 66 BPM

## 2018-04-04 VITALS — HEART RATE: 62 BPM

## 2018-04-04 VITALS — HEART RATE: 71 BPM

## 2018-04-04 VITALS — HEART RATE: 64 BPM

## 2018-04-04 VITALS — HEART RATE: 68 BPM

## 2018-04-04 VITALS — OXYGEN SATURATION: 95 %

## 2018-04-04 VITALS — HEART RATE: 72 BPM

## 2018-04-04 VITALS — HEART RATE: 74 BPM

## 2018-04-04 LAB
BUN SERPL-MCNC: 23 MG/DL (ref 7–18)
CALCIUM SERPL-MCNC: 8.5 MG/DL (ref 8.5–10.1)
CHLORIDE SERPL-SCNC: 102 MEQ/L (ref 98–107)
CREAT SERPL-MCNC: 1.54 MG/DL (ref 0.6–1.3)
ERYTHROCYTE [DISTWIDTH] IN BLOOD BY AUTOMATED COUNT: 23.3 % (ref 11.6–17.2)
GFR SERPLBLD BASED ON 1.73 SQ M-ARVRAT: 44 ML/MIN (ref 89–?)
GLUCOSE SERPL-MCNC: 87 MG/DL (ref 74–106)
HCO3 BLD-SCNC: 30.3 MEQ/L (ref 21–32)
HCT VFR BLD CALC: 32.4 % (ref 39–51)
HGB BLD-MCNC: 10.5 GM/DL (ref 13–17)
MCH RBC QN AUTO: 27.2 PG (ref 27–34)
MCHC RBC AUTO-ENTMCNC: 32.4 % (ref 32–36)
MCV RBC AUTO: 83.8 FL (ref 80–100)
PLATELET # BLD: 197 TH/MM3 (ref 150–450)
PMV BLD AUTO: 9.5 FL (ref 7–11)
RBC # BLD AUTO: 3.86 MIL/MM3 (ref 4.5–5.9)
SODIUM SERPL-SCNC: 141 MEQ/L (ref 136–145)
WBC # BLD AUTO: 6.9 TH/MM3 (ref 4–11)

## 2018-04-04 RX ADMIN — FERROUS SULFATE TAB 325 MG (65 MG ELEMENTAL FE) SCH MG: 325 (65 FE) TAB at 09:10

## 2018-04-04 RX ADMIN — PANTOPRAZOLE SCH MG: 40 TABLET, DELAYED RELEASE ORAL at 09:11

## 2018-04-04 RX ADMIN — Medication SCH ML: at 09:13

## 2018-04-04 RX ADMIN — CARVEDILOL SCH MG: 3.12 TABLET, FILM COATED ORAL at 20:31

## 2018-04-04 RX ADMIN — Medication SCH ML: at 20:31

## 2018-04-04 RX ADMIN — STANDARDIZED SENNA CONCENTRATE AND DOCUSATE SODIUM SCH TAB: 8.6; 5 TABLET, FILM COATED ORAL at 09:11

## 2018-04-04 RX ADMIN — INSULIN ASPART SCH: 100 INJECTION, SOLUTION INTRAVENOUS; SUBCUTANEOUS at 20:30

## 2018-04-04 RX ADMIN — ASPIRIN SCH MG: 81 TABLET ORAL at 09:11

## 2018-04-04 RX ADMIN — WATER SCH ML: 1 IRRIGANT IRRIGATION at 09:12

## 2018-04-04 RX ADMIN — CYANOCOBALAMIN TAB 1000 MCG SCH MCG: 1000 TAB at 09:11

## 2018-04-04 RX ADMIN — STANDARDIZED SENNA CONCENTRATE AND DOCUSATE SODIUM SCH TAB: 8.6; 5 TABLET, FILM COATED ORAL at 20:31

## 2018-04-04 RX ADMIN — FUROSEMIDE SCH MG: 40 TABLET ORAL at 09:50

## 2018-04-04 RX ADMIN — INSULIN ASPART SCH: 100 INJECTION, SOLUTION INTRAVENOUS; SUBCUTANEOUS at 12:00

## 2018-04-04 RX ADMIN — INSULIN ASPART SCH: 100 INJECTION, SOLUTION INTRAVENOUS; SUBCUTANEOUS at 08:00

## 2018-04-04 RX ADMIN — ATORVASTATIN CALCIUM SCH MG: 40 TABLET, FILM COATED ORAL at 09:12

## 2018-04-04 RX ADMIN — CARVEDILOL SCH MG: 3.12 TABLET, FILM COATED ORAL at 09:10

## 2018-04-04 RX ADMIN — INSULIN ASPART SCH: 100 INJECTION, SOLUTION INTRAVENOUS; SUBCUTANEOUS at 17:00

## 2018-04-04 NOTE — RSPPFT
DATE OF PROCEDURE:  4/2/18



COMMENTS:   



Spirometry with FVC of 1.9, FEV1 of 1.4, FEV1/FVC ratio at 72%. Post-bronchodilator study 
was not performed.   



IMPRESSION:    

   

1.    Moderately severe airways obstruction.

## 2018-04-04 NOTE — HHI.PR
Subjective


Remarks


Patient reports he is feeling ok. No new issues. CT surgery consulted for 

second opinion.





4-4 patient seen by cardiology meds adjusted started back on Plavix will be 

monitored in the hospital for the next few days before being cleared for 

discharge to make sure he can tolerate the Plavix with the future need for TAVR


Patient is refusing insulin


We will need his teeth taken care of before having the TAVR


Monitor in the hospital


Continue physical therapy and Occupational Therapy


Discussed with patient and RN and case management


We will replace his potassium





Objective


Vitals





Vital Signs








  Date Time  Temp Pulse Resp B/P (MAP) Pulse Ox O2 Delivery O2 Flow Rate FiO2


 


4/4/18 11:22 97.7 72 18 135/71 (92) 97   


 


4/4/18 10:00  70      


 


4/4/18 09:00  74      


 


4/4/18 08:00  72      


 


4/4/18 07:07 97.5 66 16 120/63 (82) 96   


 


4/4/18 06:33  64      


 


4/4/18 05:11  64      


 


4/4/18 04:20  71      


 


4/4/18 03:12 98.0 73 17 119/65 (83) 97   


 


4/4/18 03:00  69      


 


4/4/18 02:21  68      


 


4/4/18 01:03  70      


 


4/4/18 00:18  68      


 


4/3/18 23:20 98.3 70 18 101/58 (72) 94   


 


4/3/18 23:00  71      


 


4/3/18 22:00  76      


 


4/3/18 21:00  82      


 


4/3/18 20:00  80      


 


4/3/18 19:31 98.1 83 18 117/72 (87) 96   


 


4/3/18 19:00  76      


 


4/3/18 18:54  77      


 


4/3/18 17:18  78      


 


4/3/18 16:22  68      


 


4/3/18 15:33  67      


 


4/3/18 15:30 97.8 70 18 110/60 (77) 97   


 


4/3/18 14:00  72      


 


4/3/18 13:13  68      














I/O      


 


 4/3/18 4/3/18 4/3/18 4/4/18 4/4/18 4/4/18





 07:00 15:00 23:00 07:00 15:00 23:00


 


Intake Total 360 ml  240 ml 600 ml  


 


Output Total   750 ml 500 ml  


 


Balance 360 ml  -510 ml 100 ml  


 


      


 


Intake Oral 360 ml  240 ml 600 ml  


 


Output Urine Total   750 ml 500 ml  


 


# Voids 3     


 


# Bowel Movements 1   0  








Result Diagram:  


4/4/18 0501                                                                    

            4/4/18 0501





Other Results





 Laboratory Tests








Test


  4/1/18


15:25 4/2/18


06:33 4/3/18


05:45 4/3/18


05:46


 


Hemoglobin 9.6 GM/DL  9.8 GM/DL   10.0 GM/DL 


 


Hematocrit 30.0 %  30.7 %   31.1 % 


 


White Blood Count  6.0 TH/MM3   6.4 TH/MM3 


 


Red Blood Count  3.62 MIL/MM3   3.67 MIL/MM3 


 


Mean Corpuscular Volume  85.0 FL   84.8 FL 


 


Mean Corpuscular Hemoglobin  27.1 PG   27.4 PG 


 


Mean Corpuscular Hemoglobin


Concent 


  31.9 % 


  


  32.3 % 


 


 


Red Cell Distribution Width  24.0 %   23.9 % 


 


Platelet Count  176 TH/MM3   194 TH/MM3 


 


Mean Platelet Volume  10.0 FL   9.6 FL 


 


Neutrophils (%) (Auto)  73.5 %   


 


Lymphocytes (%) (Auto)  14.3 %   


 


Monocytes (%) (Auto)  8.7 %   


 


Eosinophils (%) (Auto)  2.6 %   


 


Basophils (%) (Auto)  0.9 %   


 


Neutrophils # (Auto)  4.4 TH/MM3   


 


Lymphocytes # (Auto)  0.9 TH/MM3   


 


Monocytes # (Auto)  0.5 TH/MM3   


 


Eosinophils # (Auto)  0.2 TH/MM3   


 


Basophils # (Auto)  0.1 TH/MM3   


 


CBC Comment  DIFF FINAL   


 


Differential Comment     


 


Blood Urea Nitrogen  22 MG/DL  23 MG/DL  


 


Creatinine  1.53 MG/DL  1.62 MG/DL  


 


Random Glucose  89 MG/DL  97 MG/DL  


 


Calcium Level  8.7 MG/DL  8.9 MG/DL  


 


Sodium Level  141 MEQ/L  140 MEQ/L  


 


Potassium Level  3.6 MEQ/L  3.8 MEQ/L  


 


Chloride Level  105 MEQ/L  101 MEQ/L  


 


Carbon Dioxide Level  30.6 MEQ/L  33.8 MEQ/L  


 


Anion Gap  5 MEQ/L  5 MEQ/L  


 


Estimat Glomerular Filtration


Rate 


  44 ML/MIN 


  41 ML/MIN 


  


 


 


B-Type Natriuretic Peptide  3506 PG/ML   


 


Albumin  3.8 GM/DL   


 


Triglycerides Level  75 MG/DL   


 


Cholesterol Level  87 MG/DL   


 


LDL Cholesterol  28 MG/DL   


 


HDL Cholesterol  44.0 MG/DL   


 


Cholesterol/HDL Ratio  1.97 RATIO   


 


Test


  4/4/18


05:01 


  


  


 


 


White Blood Count 6.9 TH/MM3    


 


Red Blood Count 3.86 MIL/MM3    


 


Hemoglobin 10.5 GM/DL    


 


Hematocrit 32.4 %    


 


Mean Corpuscular Volume 83.8 FL    


 


Mean Corpuscular Hemoglobin 27.2 PG    


 


Mean Corpuscular Hemoglobin


Concent 32.4 % 


  


  


  


 


 


Red Cell Distribution Width 23.3 %    


 


Platelet Count 197 TH/MM3    


 


Mean Platelet Volume 9.5 FL    


 


Blood Urea Nitrogen 23 MG/DL    


 


Creatinine 1.54 MG/DL    


 


Random Glucose 87 MG/DL    


 


Calcium Level 8.5 MG/DL    


 


Sodium Level 141 MEQ/L    


 


Potassium Level 3.4 MEQ/L    


 


Chloride Level 102 MEQ/L    


 


Carbon Dioxide Level 30.3 MEQ/L    


 


Anion Gap 9 MEQ/L    


 


Estimat Glomerular Filtration


Rate 44 ML/MIN 


  


  


  


 








Imaging





Last Impressions








Chest X-Ray 3/30/18 1503 Signed





Impressions: 





 Service Date/Time:  Friday, March 30, 2018 15:32 - CONCLUSION:  1. Right 

basilar 





 consolidation with possible small effusion. 2. Mild atelectatic changes of the 





 left hemidiaphragm. 3. Slight interstitial prominence which could represent 

some 





 degree of vascular congestion or volume overload. Heart size is borderline 





 prominent.     Gal Mayfield MD 








Objective Remarks


GENERAL: Awake alert and oriented 3 talkative and cooperative


SKIN: Warm and dry.


HEAD: Atraumatic. Normocephalic. 


EYES: Pupils equal and round. No scleral icterus. No injection or drainage.  

Extraocular muscles intact


ENT: No nasal bleeding or discharge.  Mucous membranes pink and moist.  Tongue 

is midline


NECK: Trachea midline. No JVD.  Supple


CARDIOVASCULAR: Regular rate and rhythm.  S1-S2 no S3 or S4 3/6 systolic 

ejection murmur


RESPIRATORY: No accessory muscle use. Clear to auscultation. Breath sounds 

equal bilaterally. 


GASTROINTESTINAL: Abdomen soft, non-tender, nondistended. Hepatic and splenic 

margins not palpable. 


MUSCULOSKELETAL: Extremities without clubbing, cyanosis, or edema. No obvious 

deformities. 


NEUROLOGICAL: Awake and alert. No obvious cranial nerve deficits.  Motor 

grossly within normal limits. Five out of 5 muscle strength in the arms and 

legs.  Normal speech.


PSYCHIATRIC: INAppropriate mood and affect; insight and judgment ABnormal.


Medications and IVs





Current Medications


Sodium Chloride (NS Flush) 2 ml UNSCH  PRN IVF FLUSH AFTER USING IV ACCESS Last 

administered on 4/1/18at 08:57;  Start 3/30/18 at 15:15;  Stop 4/1/18 at 10:47;

  Status DC


Furosemide (Lasix Inj) 80 mg ONCE  ONCE IVP  Last administered on 3/30/18at 15:

10;  Start 3/30/18 at 15:15;  Stop 3/30/18 at 15:16;  Status DC


Albuterol/ Ipratropium (Duoneb Neb) 1 ampule Q15M INH  Last administered on 3/30

/18at 15:46;  Start 3/30/18 at 15:15;  Stop 3/30/18 at 15:46;  Status DC


Nitroglycerin (Nitroglycerin 2% Oint) 1 inch ONCE  ONCE TOPICAL  Last 

administered on 3/30/18at 15:10;  Start 3/30/18 at 15:15;  Stop 3/30/18 at 15:16

;  Status DC


Ondansetron HCl (Zofran Inj) 4 mg Q6H  PRN IVP NAUSEA OR VOMITING;  Start 3/30/

18 at 17:30;  Stop 4/2/18 at 15:27;  Status DC


Heparin Sodium (Porcine) (Heparin Inj) 5,000 units Q8H SQ  Last administered on 

4/1/18at 01:36;  Start 3/30/18 at 17:30;  Status Future Hold


Acetaminophen/ Hydrocodone Bitart (Norco  5-325 Mg) 1 tab Q4H  PRN PO PAIN 

SCALE 3 TO 5;  Start 3/30/18 at 17:30


Acetaminophen/ Hydrocodone Bitart (Norco  7.5-325 Mg) 1 tab Q4H  PRN PO PAIN 

SCALE 6 TO 10;  Start 3/30/18 at 17:30


Morphine Sulfate (Morphine Inj) 4 mg Q3H  PRN IV PUSH BREAKTHROUGH PAIN;  Start 

3/30/18 at 17:30


Naloxone HCl (Narcan Inj) 0.4 mg UNSCH  PRN IV PUSH SEE LABEL COMMENTS;  Start 3

/30/18 at 17:30


Senna/Docusate Sodium (Arielle-Colace) 1 tab BID PO  Last administered on 4/4/18at 

09:11;  Start 3/30/18 at 21:00


Magnesium Hydroxide (Milk Of Magnesia Liq) 30 ml Q12H  PRN PO Mild constipation 

Last administered on 4/2/18at 13:06;  Start 3/30/18 at 17:30


Sennosides (Senokot) 17.2 mg Q12H  PRN PO Moderate constipation;  Start 3/30/18 

at 17:30


Furosemide (Lasix Inj) 40 mg DAILY@0700,1900 IV PUSH  Last administered on 4/3/

18at 18:31;  Start 3/31/18 at 07:00;  Stop 4/3/18 at 22:47;  Status DC


Atorvastatin Calcium (Lipitor) 40 mg DAILY PO  Last administered on 4/4/18at 09:

12;  Start 3/31/18 at 09:00


Cyanocobalamin (Vitamin B12) 1,000 mcg DAILY PO  Last administered on 4/4/18at 

09:11;  Start 3/31/18 at 09:00


Ferrous Sulfate (Ferrous Sulfate) 325 mg DAILY PO  Last administered on 4/4/ 18at 09:10;  Start 3/31/18 at 09:00


Lisinopril (Prinivil) 5 mg DAILY PO ;  Start 3/31/18 at 09:00;  Stop 3/31/18 at 

09:52;  Status DC


Carvedilol (Coreg) 3.125 mg Q12HR PO  Last administered on 4/4/18at 09:10;  

Start 3/31/18 at 09:00


Dextrose (D50w (Vial) Inj) 50 ml UNSCH  PRN IV PUSH HYPOGLYCEMIA-SEE COMMENTS;  

Start 3/31/18 at 14:00


Glucagon (Glucagon Inj) 1 mg UNSCH  PRN OTHER HYPOGLYCEMIA-SEE COMMENTS;  Start 

3/31/18 at 14:00


Insulin Aspart (NovoLOG SUPPLEMENTAL SCALE) 1 ACHS SLIDING  SCALE SQ ;  Start 3/

31/18 at 17:00


Pantoprazole Sodium (Protonix Inj) 40 mg Q12H IV PUSH  Last administered on 4/3/

18at 15:31;  Start 4/1/18 at 02:00;  Stop 4/3/18 at 22:47;  Status DC


Sodium Chloride 1,000 ml @  100 mls/hr Q10H IV  Last administered on 4/1/18at 09

:00;  Start 4/1/18 at 08:55;  Stop 4/1/18 at 16:01;  Status DC


Diphenhydramine HCl (Benadryl) 25 mg ON  CALL PO  Last administered on 4/1/18at 

09:23;  Start 4/1/18 at 09:00;  Stop 4/5/18 at 08:59


Diazepam (Valium) 5 mg ON  CALL PO  Last administered on 4/1/18at 09:23;  Start 

4/1/18 at 09:00;  Stop 4/5/18 at 08:59


Midazolam HCl (Versed Inj) 2 mg STK-MED ONCE .ROUTE  Last administered on 4/1/ 18at 10:00;  Start 4/1/18 at 09:39;  Stop 4/1/18 at 09:40;  Status DC


Heparin Sodium/ Sodium Chloride 2,000 ml @  As Directed STK-MED ONCE IV FLUSH ;

  Start 4/1/18 at 09:41;  Stop 4/1/18 at 09:42;  Status DC


Sodium Chloride 1,000 ml @  100 mls/hr Q10H IV  Last administered on 4/1/18at 11

:05;  Start 4/1/18 at 10:43;  Stop 4/1/18 at 16:01;  Status DC


Sodium Chloride (NS Flush) 2 ml BID IV FLUSH  Last administered on 4/4/18at 09:

13;  Start 4/1/18 at 21:00


Sodium Chloride (NS Flush) 2 ml UNSCH  PRN IV FLUSH FLUSH AFTER USING IV ACCESS

;  Start 4/1/18 at 10:45


Miscellaneous Information 1 ONCE  ONCE XX ;  Start 4/1/18 at 10:45;  Stop 4/1/ 18 at 11:04;  Status DC


Ondansetron HCl (Zofran Inj) 4 mg Q4H  PRN IV PUSH NAUSEA;  Start 4/1/18 at 10:

45


Bacitracin (Bacitracin Oint Packet) 0.9 gm ONCE  ONCE TOP  Last administered on 

4/1/18at 10:55;  Start 4/1/18 at 10:45;  Stop 4/1/18 at 10:47;  Status DC


Aspirin (Ecotrin Ec) 81 mg DAILY PO  Last administered on 4/4/18at 09:11;  

Start 4/2/18 at 09:00


Iohexol (OMNIPAQUE 350 INJ (Cath Lab)) 50 ml STK-MED ONCE OTHER ;  Start 3/30/

18 at 16:43;  Stop 4/2/18 at 08:00;  Status DC


Lactulose (Lactulose Liq) 30 ml DAILY PO  Last administered on 4/4/18at 09:12;  

Start 4/2/18 at 15:30


Furosemide (Lasix) 40 mg DAILY PO  Last administered on 4/4/18at 09:50;  Start 4 /4/18 at 09:00


Pantoprazole Sodium (Protonix) 40 mg DAILY PO  Last administered on 4/4/18at 09:

11;  Start 4/4/18 at 09:00


Clopidogrel Bisulfate (Plavix) 600 mg ONCE  ONCE PO ;  Start 4/4/18 at 11:15;  

Stop 4/4/18 at 11:16;  Status UNV


Clopidogrel Bisulfate (Plavix) 75 mg DAILY PO ;  Start 4/5/18 at 09:00;  Status 

UNV





A/P


Assessment and Plan


77-year-old male with hypertension, dyslipidemia, aortic stenosis, CHF, CAD, CKD

, gout and diabetes admitted with three-day history of worsening shortness of 

breath and bilateral lower extremity edema.





Acute on chronic systolic CHF, decompensated


Last echo 2/11/18 with EF 45-50%, mild LVH, severe aortic stenosis


   -CXR shows right basilar consolidation/effusion with some atelectatic 

changes in the left base


   -BNP 4996


   -Continue on IV Lasix for diuresis


   -Monitor I&Os


   -Fluid/salt restricted diet


   -CHF teaching


   -Continue on Coreg


   -continue to monitor volume status


   -Continue supplemental oxygen to maintain O2 sats above 92%, patient weaned 

off to room air


   Started on Plavix to see if there is any bleeding





CAD, ?previous stenting


Severe aortic stenosis


   -Cardiology following, appreciate assistance.  Repeat echocardiogram 

confirms severity of AS.  Status post heart catheterization, recommend medical 

management and CT surgery consulted to consider TAVR   


   -Dr. Baxter evaluated the patient. Sobeida Baez consulted for second opinion. 


   Started on Plavix to see if there is any bleeding





GIB


History of recent GI bleed requiring transfusion 3 units PRBCs


   -s/p EGD/colonoscopy 2/24/18, irregular z line, mild gastritis, multiple 

polyps


   -no significant source of bleeding identified


   -Hemoccult positive this admission


   -hold Heparin, started on Protonix


   -H&H stable and improved.


   Started on Plavix to see if there is any bleeding





Hypertension, chronic, controlled


   -Continue on Coreg but at decreased dose with holding parameters


   -continue to monitor BP and adjust accordingly





JOSIE on Suspected CKD stage III


   -baseline creatinine likely around 1.3-1.4


   -creatinine up while on IV Lasix. Transition to oral Lasix. 


   -avoid nephrotoxic agents


   -continue to monitor renal indices





Diabetes


   -Continue to hold metformin


   -Accu-Cheks and insulin sliding scale


   -Hemoglobin A1C 4.8





Iron deficiency anemia, secondary to GIB


   -Resume home dose of iron daily


   -Hemoglobin stable. 





Gout


   -no exacerbation


   -monitor





Hypokalemia will replace 





DVT prophylaxis


   -sq Heparin on hold secondary to ?GIB.  Encourage ambulation


Discharge Planning


Pending cardiac clearance











Brad Pinto DO Apr 4, 2018 12:37

## 2018-04-04 NOTE — PD.FRAIL
Date: Apr 2, 2018 


Height: 170.18 cm 


Weight: 79.4 kg


BMI:  27.4


Assessment Performed:  Inpatient


Days in Hospital at Exam:  3


Albumin


4/2/18 06:33: Albumin 3.8


Pass/Fail:  Pass





[place] Activities Daily Living


[place] ADL Score:  


 Bathing(bathes self/help in single area): [place] (1), Dressing(gets/puts 

clothes on self): [place] (1), Toileting(goes without help): [place] (

1), [facility] [place] [facility]), Continence(

complete self-control): [place] (1), Feeding(self, prep by another allowed)

: [place] (1), Total: 6


Pass/Fail:  Pass





 Strength


Grasp 1:  22


Grasp 2:  22


Grasp 3:  20


Average:  21.3


Pass/Fail:  Fail





15-Foot Walk


15-Foot Walk (seconds):  10.5 (patient very unstead on feet- states he feels 

dizzy, no SOB)


Pass/Fail:  Fail





Total Frailty


Total Frailty (out of 4):  2





Frailty Index Score Reference


 Strength:


   BMI: <=24      Cutoff for  strength(Kg): <=29          


   BMI: 24.1-28      Cutoff for  strength(Kg): <=30          


   BMI: >28      Cutoff for  strength(Kg): <=32





15-Foot Walk:


   Height: <=173 cm      15-Foot Walk Cutoff Time: >=7 seconds          


   Height: >173 cm      15-Foot Walk Cutoff Time: >=6 seconds











Roly Grajeda RN Apr 4, 2018 05:55

## 2018-04-04 NOTE — PD.CARD.PN
Subjective


Subjective Remarks


Pt without complaints





Objective


Medications





Current Medications








 Medications


  (Trade)  Dose


 Ordered  Sig/Ramu


 Route  Start Time


 Stop Time Status Last Admin


 


  (Heparin Inj)  5,000 units  Q8H


 SQ  3/30/18 17:30


   Future Hold 4/1/18 01:36


 


 


  (Norco  5-325 Mg)  1 tab  Q4H  PRN


 PO  3/30/18 17:30


     


 


 


  (Norco  7.5-325


 Mg)  1 tab  Q4H  PRN


 PO  3/30/18 17:30


     


 


 


  (Morphine Inj)  4 mg  Q3H  PRN


 IV PUSH  3/30/18 17:30


     


 


 


  (Narcan Inj)  0.4 mg  UNSCH  PRN


 IV PUSH  3/30/18 17:30


     


 


 


  (Arielle-Colace)  1 tab  BID


 PO  3/30/18 21:00


    4/3/18 20:59


 


 


  (Milk Of


 Magnesia Liq)  30 ml  Q12H  PRN


 PO  3/30/18 17:30


    4/2/18 13:06


 


 


  (Senokot)  17.2 mg  Q12H  PRN


 PO  3/30/18 17:30


     


 


 


  (Lipitor)  40 mg  DAILY


 PO  3/31/18 09:00


    4/3/18 09:06


 


 


  (Vitamin B12)  1,000 mcg  DAILY


 PO  3/31/18 09:00


    4/3/18 09:06


 


 


  (Ferrous Sulfate)  325 mg  DAILY


 PO  3/31/18 09:00


    4/3/18 09:06


 


 


  (Coreg)  3.125 mg  Q12HR


 PO  3/31/18 09:00


    4/3/18 20:59


 


 


  (D50w (Vial) Inj)  50 ml  UNSCH  PRN


 IV PUSH  3/31/18 14:00


     


 


 


  (Glucagon Inj)  1 mg  UNSCH  PRN


 OTHER  3/31/18 14:00


     


 


 


  (NovoLOG


 SUPPLEMENTAL


 SCALE)  1  ACHS SLIDING  SCALE


 SQ  3/31/18 17:00


     


 


 


  (Benadryl)  25 mg  ON  CALL


 PO  4/1/18 09:00


 4/5/18 08:59  4/1/18 09:23


 


 


  (Valium)  5 mg  ON  CALL


 PO  4/1/18 09:00


 4/5/18 08:59  4/1/18 09:23


 


 


  (NS Flush)  2 ml  BID


 IV FLUSH  4/1/18 21:00


    4/3/18 20:59


 


 


  (NS Flush)  2 ml  UNSCH  PRN


 IV FLUSH  4/1/18 10:45


     


 


 


  (Zofran Inj)  4 mg  Q4H  PRN


 IV PUSH  4/1/18 10:45


     


 


 


  (Ecotrin Ec)  81 mg  DAILY


 PO  4/2/18 09:00


    4/3/18 09:07


 


 


  (Lactulose Liq)  30 ml  DAILY


 PO  4/2/18 15:30


    4/3/18 09:06


 


 


  (Lasix)  40 mg  DAILY


 PO  4/4/18 09:00


     


 


 


  (Protonix)  40 mg  DAILY


 PO  4/4/18 09:00


     


 








Vital Signs / I&O





Vital Signs








  Date Time  Temp Pulse Resp B/P (MAP) Pulse Ox O2 Delivery O2 Flow Rate FiO2


 


4/4/18 07:07 97.5 66 16 120/63 (82) 96   


 


4/4/18 06:33  64      


 


4/4/18 05:11  64      


 


4/4/18 04:20  71      


 


4/4/18 03:12 98.0 73 17 119/65 (83) 97   


 


4/4/18 03:00  69      


 


4/4/18 02:21  68      


 


4/4/18 01:03  70      


 


4/4/18 00:18  68      


 


4/3/18 23:20 98.3 70 18 101/58 (72) 94   


 


4/3/18 23:00  71      


 


4/3/18 22:00  76      


 


4/3/18 21:00  82      


 


4/3/18 20:00  80      


 


4/3/18 19:31 98.1 83 18 117/72 (87) 96   


 


4/3/18 19:00  76      


 


4/3/18 18:54  77      


 


4/3/18 17:18  78      


 


4/3/18 16:22  68      


 


4/3/18 15:33  67      


 


4/3/18 15:30 97.8 70 18 110/60 (77) 97   


 


4/3/18 14:00  72      


 


4/3/18 13:13  68      


 


4/3/18 12:00  66      


 


4/3/18 11:30 97.9 71 20 115/59 (77) 97   


 


4/3/18 11:00  67      


 


4/3/18 10:00  70      


 


4/3/18 09:13     98   21


 


4/3/18 09:00  62      


 


4/3/18 08:00  68      


 


4/3/18 07:30 98.4 66 19 126/64 (84) 97   


 


4/3/18 07:30 98.4 66 19 126/64 (84) 97   














I/O      


 


 4/3/18 4/3/18 4/3/18 4/4/18 4/4/18 4/4/18





 07:00 15:00 23:00 07:00 15:00 23:00


 


Intake Total 360 ml  240 ml 600 ml  


 


Output Total   750 ml 500 ml  


 


Balance 360 ml  -510 ml 100 ml  


 


      


 


Intake Oral 360 ml  240 ml 600 ml  


 


Output Urine Total   750 ml 500 ml  


 


# Voids 3     


 


# Bowel Movements 1   0  








Physical Exam


GENERAL: Well developed, well nourished. No acute distress.


HEENT: Jugular venous pressure is normal. 


CHEST: Lungs clear to auscultation bilaterally. Unlabored respiratory effort.


CARDIAC: Regular rate and rhythm, harsh GEORGES.


ABDOMEN: Soft, Bowel sounds present.


EXTREMITIES: No clubbing, cyanosis, or edema.


Laboratory





Laboratory Tests








Test


  4/4/18


05:01


 


White Blood Count 6.9 TH/MM3 


 


Red Blood Count 3.86 MIL/MM3 


 


Hemoglobin 10.5 GM/DL 


 


Hematocrit 32.4 % 


 


Mean Corpuscular Volume 83.8 FL 


 


Mean Corpuscular Hemoglobin 27.2 PG 


 


Mean Corpuscular Hemoglobin


Concent 32.4 % 


 


 


Red Cell Distribution Width 23.3 % 


 


Platelet Count 197 TH/MM3 


 


Mean Platelet Volume 9.5 FL 


 


Blood Urea Nitrogen 23 MG/DL 


 


Creatinine 1.54 MG/DL 


 


Random Glucose 87 MG/DL 


 


Calcium Level 8.5 MG/DL 


 


Sodium Level 141 MEQ/L 


 


Potassium Level 3.4 MEQ/L 


 


Chloride Level 102 MEQ/L 


 


Carbon Dioxide Level 30.3 MEQ/L 


 


Anion Gap 9 MEQ/L 


 


Estimat Glomerular Filtration


Rate 44 ML/MIN 


 











Assessment and Plan


Problem List:  


(1) Aortic stenosis


ICD Codes:  I35.0 - Nonrheumatic aortic (valve) stenosis


Status:  Chronic


Plan:  severe AS, likely to have TAVR


- work up in progress





(2) CAD (coronary artery disease)


ICD Codes:  I25.10 - Atherosclerotic heart disease of native coronary artery 

without angina pectoris


Status:  Chronic


Plan:  stable on present meds





(3) Diabetes mellitus


ICD Codes:  E11.9 - Type 2 diabetes mellitus without complications


(4) CHF (congestive heart failure)


ICD Codes:  I50.9 - Heart failure, unspecified


Status:  Acute


Plan:  stable, continue present meds


-fluid and sodium restrictions discussed





(5) Ischemic cardiomyopathy


ICD Codes:  I25.5 - Ischemic cardiomyopathy


(6) JOSIE (acute kidney injury)


ICD Codes:  N17.9 - Acute kidney failure, unspecified


(7) Severe anemia


ICD Codes:  D64.9 - Anemia, unspecified


Status:  Acute





Problem Qualifiers





(1) CHF (congestive heart failure):  


Qualified Codes:  I50.9 - Heart failure, unspecified








Radha Mason MD Apr 4, 2018 07:31

## 2018-04-05 VITALS
TEMPERATURE: 98 F | RESPIRATION RATE: 17 BRPM | HEART RATE: 68 BPM | DIASTOLIC BLOOD PRESSURE: 62 MMHG | OXYGEN SATURATION: 97 % | SYSTOLIC BLOOD PRESSURE: 115 MMHG

## 2018-04-05 VITALS — HEART RATE: 68 BPM

## 2018-04-05 VITALS — HEART RATE: 62 BPM

## 2018-04-05 VITALS — HEART RATE: 65 BPM

## 2018-04-05 VITALS
RESPIRATION RATE: 20 BRPM | SYSTOLIC BLOOD PRESSURE: 115 MMHG | OXYGEN SATURATION: 98 % | HEART RATE: 70 BPM | DIASTOLIC BLOOD PRESSURE: 59 MMHG

## 2018-04-05 VITALS
TEMPERATURE: 98.7 F | HEART RATE: 70 BPM | DIASTOLIC BLOOD PRESSURE: 71 MMHG | RESPIRATION RATE: 20 BRPM | OXYGEN SATURATION: 99 % | SYSTOLIC BLOOD PRESSURE: 123 MMHG

## 2018-04-05 VITALS
RESPIRATION RATE: 20 BRPM | TEMPERATURE: 98.1 F | HEART RATE: 70 BPM | DIASTOLIC BLOOD PRESSURE: 75 MMHG | SYSTOLIC BLOOD PRESSURE: 124 MMHG | OXYGEN SATURATION: 98 %

## 2018-04-05 VITALS
TEMPERATURE: 98.5 F | OXYGEN SATURATION: 97 % | DIASTOLIC BLOOD PRESSURE: 67 MMHG | HEART RATE: 70 BPM | RESPIRATION RATE: 18 BRPM | SYSTOLIC BLOOD PRESSURE: 127 MMHG

## 2018-04-05 VITALS — HEART RATE: 66 BPM

## 2018-04-05 VITALS — HEART RATE: 70 BPM

## 2018-04-05 VITALS — HEART RATE: 72 BPM

## 2018-04-05 VITALS — HEART RATE: 64 BPM

## 2018-04-05 LAB
ALBUMIN SERPL-MCNC: 3.7 GM/DL (ref 3.4–5)
ALP SERPL-CCNC: 90 U/L (ref 45–117)
ALT SERPL-CCNC: 22 U/L (ref 12–78)
AST SERPL-CCNC: 17 U/L (ref 15–37)
BASOPHILS # BLD AUTO: 0.1 TH/MM3 (ref 0–0.2)
BASOPHILS NFR BLD: 0.7 % (ref 0–2)
BILIRUB SERPL-MCNC: 0.6 MG/DL (ref 0.2–1)
BUN SERPL-MCNC: 24 MG/DL (ref 7–18)
CALCIUM SERPL-MCNC: 8.8 MG/DL (ref 8.5–10.1)
CHLORIDE SERPL-SCNC: 101 MEQ/L (ref 98–107)
CREAT SERPL-MCNC: 1.6 MG/DL (ref 0.6–1.3)
EOSINOPHIL # BLD: 0.3 TH/MM3 (ref 0–0.4)
EOSINOPHIL NFR BLD: 3.2 % (ref 0–4)
ERYTHROCYTE [DISTWIDTH] IN BLOOD BY AUTOMATED COUNT: 22.8 % (ref 11.6–17.2)
GFR SERPLBLD BASED ON 1.73 SQ M-ARVRAT: 42 ML/MIN (ref 89–?)
GLUCOSE SERPL-MCNC: 103 MG/DL (ref 74–106)
HBA1C MFR BLD: 4.9 % (ref 4.3–6)
HCO3 BLD-SCNC: 28.7 MEQ/L (ref 21–32)
HCT VFR BLD CALC: 32.5 % (ref 39–51)
HGB BLD-MCNC: 10.6 GM/DL (ref 13–17)
LYMPHOCYTES # BLD AUTO: 0.9 TH/MM3 (ref 1–4.8)
LYMPHOCYTES NFR BLD AUTO: 10.9 % (ref 9–44)
MAGNESIUM SERPL-MCNC: 2.5 MG/DL (ref 1.5–2.5)
MCH RBC QN AUTO: 27.4 PG (ref 27–34)
MCHC RBC AUTO-ENTMCNC: 32.7 % (ref 32–36)
MCV RBC AUTO: 83.7 FL (ref 80–100)
MONOCYTE #: 0.7 TH/MM3 (ref 0–0.9)
MONOCYTES NFR BLD: 8.2 % (ref 0–8)
NEUTROPHILS # BLD AUTO: 6.6 TH/MM3 (ref 1.8–7.7)
NEUTROPHILS NFR BLD AUTO: 77 % (ref 16–70)
PHOSPHATE SERPL-MCNC: 3.4 MG/DL (ref 2.5–4.9)
PLATELET # BLD: 214 TH/MM3 (ref 150–450)
PMV BLD AUTO: 9.5 FL (ref 7–11)
PROT SERPL-MCNC: 6.9 GM/DL (ref 6.4–8.2)
RBC # BLD AUTO: 3.89 MIL/MM3 (ref 4.5–5.9)
SODIUM SERPL-SCNC: 138 MEQ/L (ref 136–145)
T4 FREE SERPL-MCNC: 1.08 NG/DL (ref 0.76–1.46)
WBC # BLD AUTO: 8.5 TH/MM3 (ref 4–11)

## 2018-04-05 RX ADMIN — INSULIN ASPART SCH: 100 INJECTION, SOLUTION INTRAVENOUS; SUBCUTANEOUS at 17:00

## 2018-04-05 RX ADMIN — STANDARDIZED SENNA CONCENTRATE AND DOCUSATE SODIUM SCH TAB: 8.6; 5 TABLET, FILM COATED ORAL at 22:50

## 2018-04-05 RX ADMIN — INSULIN ASPART SCH: 100 INJECTION, SOLUTION INTRAVENOUS; SUBCUTANEOUS at 08:00

## 2018-04-05 RX ADMIN — WATER SCH ML: 1 IRRIGANT IRRIGATION at 08:55

## 2018-04-05 RX ADMIN — INSULIN ASPART SCH: 100 INJECTION, SOLUTION INTRAVENOUS; SUBCUTANEOUS at 12:00

## 2018-04-05 RX ADMIN — CLOPIDOGREL BISULFATE SCH MG: 75 TABLET, FILM COATED ORAL at 08:57

## 2018-04-05 RX ADMIN — CARVEDILOL SCH MG: 3.12 TABLET, FILM COATED ORAL at 08:56

## 2018-04-05 RX ADMIN — Medication SCH ML: at 22:51

## 2018-04-05 RX ADMIN — ATORVASTATIN CALCIUM SCH MG: 40 TABLET, FILM COATED ORAL at 08:56

## 2018-04-05 RX ADMIN — FERROUS SULFATE TAB 325 MG (65 MG ELEMENTAL FE) SCH MG: 325 (65 FE) TAB at 08:56

## 2018-04-05 RX ADMIN — CARVEDILOL SCH MG: 3.12 TABLET, FILM COATED ORAL at 22:50

## 2018-04-05 RX ADMIN — Medication SCH ML: at 08:55

## 2018-04-05 RX ADMIN — POTASSIUM CHLORIDE SCH MEQ: 20 TABLET, EXTENDED RELEASE ORAL at 08:57

## 2018-04-05 RX ADMIN — PANTOPRAZOLE SCH MG: 40 TABLET, DELAYED RELEASE ORAL at 08:56

## 2018-04-05 RX ADMIN — CYANOCOBALAMIN TAB 1000 MCG SCH MCG: 1000 TAB at 08:57

## 2018-04-05 RX ADMIN — MAGNESIUM HYDROXIDE PRN ML: 400 SUSPENSION ORAL at 15:24

## 2018-04-05 RX ADMIN — INSULIN ASPART SCH: 100 INJECTION, SOLUTION INTRAVENOUS; SUBCUTANEOUS at 21:00

## 2018-04-05 RX ADMIN — ASPIRIN SCH MG: 81 TABLET ORAL at 08:56

## 2018-04-05 RX ADMIN — FUROSEMIDE SCH MG: 40 TABLET ORAL at 08:56

## 2018-04-05 RX ADMIN — STANDARDIZED SENNA CONCENTRATE AND DOCUSATE SODIUM SCH TAB: 8.6; 5 TABLET, FILM COATED ORAL at 08:56

## 2018-04-05 NOTE — HHI.PR
Subjective


Remarks


Patient reports he is feeling ok. No new issues. CT surgery consulted for 

second opinion.





4-4 patient seen by cardiology meds adjusted started back on Plavix will be 

monitored in the hospital for the next few days before being cleared for 

discharge to make sure he can tolerate the Plavix with the future need for TAVR


Patient is refusing insulin


We will need his teeth taken care of before having the TAVR


Monitor in the hospital


Continue physical therapy and Occupational Therapy


Discussed with patient and RN and case management


We will replace his potassium





4-5 STARTED ON PLAVIX YESTERDAY


NO ACTIVE BLEEDING


GI STABLE


WILL CHECK AM LABS


IF STABLE DC ON 4-6 


DW RN AND PT AND CM





Objective


Vitals





Vital Signs








  Date Time  Temp Pulse Resp B/P (MAP) Pulse Ox O2 Delivery O2 Flow Rate FiO2


 


4/5/18 11:00  68      


 


4/5/18 10:00  70      


 


4/5/18 09:04 98.5 70 18 127/67 (87) 97   


 


4/5/18 09:00  66      


 


4/5/18 08:00  70      


 


4/5/18 07:00  68      


 


4/5/18 05:07  66      


 


4/5/18 04:09  70      


 


4/5/18 03:12 98.0 68 17 115/62 (79) 97   


 


4/5/18 03:00  65      


 


4/5/18 02:14  66      


 


4/5/18 01:00  64      


 


4/5/18 00:00  62      


 


4/4/18 23:30 98.3 73 18 123/64 (83) 97   


 


4/4/18 23:00  69      


 


4/4/18 22:29  70      


 


4/4/18 21:44  76      


 


4/4/18 21:24     95   


 


4/4/18 20:30 98.1 74 18 121/63 (82) 97   


 


4/4/18 20:00  76      


 


4/4/18 19:00  70      


 


4/4/18 18:00  66      


 


4/4/18 17:00  69      


 


4/4/18 16:00  64      


 


4/4/18 15:45 98.7 63 18 122/64 (83) 98   


 


4/4/18 15:00  68      


 


4/4/18 14:00  72      


 


4/4/18 13:00  62      














I/O      


 


 4/4/18 4/4/18 4/4/18 4/5/18 4/5/18 4/5/18





 07:00 15:00 23:00 07:00 15:00 23:00


 


Intake Total 600 ml  480 ml 600 ml  


 


Output Total 500 ml  700 ml 650 ml  


 


Balance 100 ml  -220 ml -50 ml  


 


      


 


Intake Oral 600 ml  480 ml 600 ml  


 


Output Urine Total 500 ml  700 ml 650 ml  


 


# Bowel Movements 0     








Result Diagram:  


4/5/18 0529                                                                    

            4/5/18 0529





Other Results





 Laboratory Tests








Test


  4/3/18


05:45 4/3/18


05:46 4/4/18


05:01 4/5/18


05:29


 


Blood Urea Nitrogen 23 MG/DL   23 MG/DL  24 MG/DL 


 


Creatinine 1.62 MG/DL   1.54 MG/DL  1.60 MG/DL 


 


Random Glucose 97 MG/DL   87 MG/DL  103 MG/DL 


 


Calcium Level 8.9 MG/DL   8.5 MG/DL  8.8 MG/DL 


 


Sodium Level 140 MEQ/L   141 MEQ/L  138 MEQ/L 


 


Potassium Level 3.8 MEQ/L   3.4 MEQ/L  4.2 MEQ/L 


 


Chloride Level 101 MEQ/L   102 MEQ/L  101 MEQ/L 


 


Carbon Dioxide Level 33.8 MEQ/L   30.3 MEQ/L  28.7 MEQ/L 


 


Anion Gap 5 MEQ/L   9 MEQ/L  8 MEQ/L 


 


Estimat Glomerular Filtration


Rate 41 ML/MIN 


  


  44 ML/MIN 


  42 ML/MIN 


 


 


White Blood Count  6.4 TH/MM3  6.9 TH/MM3  8.5 TH/MM3 


 


Red Blood Count  3.67 MIL/MM3  3.86 MIL/MM3  3.89 MIL/MM3 


 


Hemoglobin  10.0 GM/DL  10.5 GM/DL  10.6 GM/DL 


 


Hematocrit  31.1 %  32.4 %  32.5 % 


 


Mean Corpuscular Volume  84.8 FL  83.8 FL  83.7 FL 


 


Mean Corpuscular Hemoglobin  27.4 PG  27.2 PG  27.4 PG 


 


Mean Corpuscular Hemoglobin


Concent 


  32.3 % 


  32.4 % 


  32.7 % 


 


 


Red Cell Distribution Width  23.9 %  23.3 %  22.8 % 


 


Platelet Count  194 TH/MM3  197 TH/MM3  214 TH/MM3 


 


Mean Platelet Volume  9.6 FL  9.5 FL  9.5 FL 


 


Neutrophils (%) (Auto)    77.0 % 


 


Lymphocytes (%) (Auto)    10.9 % 


 


Monocytes (%) (Auto)    8.2 % 


 


Eosinophils (%) (Auto)    3.2 % 


 


Basophils (%) (Auto)    0.7 % 


 


Neutrophils # (Auto)    6.6 TH/MM3 


 


Lymphocytes # (Auto)    0.9 TH/MM3 


 


Monocytes # (Auto)    0.7 TH/MM3 


 


Eosinophils # (Auto)    0.3 TH/MM3 


 


Basophils # (Auto)    0.1 TH/MM3 


 


CBC Comment    DIFF FINAL 


 


Differential Comment     


 


Total Protein    6.9 GM/DL 


 


Albumin    3.7 GM/DL 


 


Phosphorus Level    3.4 MG/DL 


 


Magnesium Level    2.5 MG/DL 


 


Alkaline Phosphatase    90 U/L 


 


Aspartate Amino Transf


(AST/SGOT) 


  


  


  17 U/L 


 


 


Alanine Aminotransferase


(ALT/SGPT) 


  


  


  22 U/L 


 


 


Total Bilirubin    0.6 MG/DL 


 


Free Thyroxine    1.08 NG/DL 


 


Thyroid Stimulating Hormone


3rd Gen 


  


  


  2.070 uIU/ML 


 








Imaging





Last Impressions








Chest X-Ray 3/30/18 1503 Signed





Impressions: 





 Service Date/Time:  Friday, March 30, 2018 15:32 - CONCLUSION:  1. Right 

basilar 





 consolidation with possible small effusion. 2. Mild atelectatic changes of the 





 left hemidiaphragm. 3. Slight interstitial prominence which could represent 

some 





 degree of vascular congestion or volume overload. Heart size is borderline 





 prominent.     Gal Mayfield MD 








Objective Remarks


GENERAL: Awake alert and oriented 3 talkative and cooperative


SKIN: Warm and dry.


HEAD: Atraumatic. Normocephalic. 


EYES: Pupils equal and round. No scleral icterus. No injection or drainage.  

Extraocular muscles intact


ENT: No nasal bleeding or discharge.  Mucous membranes pink and moist.  Tongue 

is midline


NECK: Trachea midline. No JVD.  Supple


CARDIOVASCULAR: Regular rate and rhythm.  S1-S2 no S3 or S4 3/6 systolic 

ejection murmur


RESPIRATORY: No accessory muscle use. Clear to auscultation. Breath sounds 

equal bilaterally. 


GASTROINTESTINAL: Abdomen soft, non-tender, nondistended. Hepatic and splenic 

margins not palpable. 


MUSCULOSKELETAL: Extremities without clubbing, cyanosis, or edema. No obvious 

deformities. 


NEUROLOGICAL: Awake and alert. No obvious cranial nerve deficits.  Motor 

grossly within normal limits. Five out of 5 muscle strength in the arms and 

legs.  Normal speech.


PSYCHIATRIC: INAppropriate mood and affect; insight and judgment ABnormal.


Procedures


04/01/2018


 


PROCEDURES PERFORMED:


Right heart catheterization, coronary angiography, right femoral 


angiography with Angio-Seal placement.


 


BRIEF HISTORY:


Tang Cho is a 77-year-old male with symptomatic aortic stenosis. 


He had an echo performed at [facility] and an echo performed 


in the hospital, each of which have shown a mean gradient in excess of


50 mmHg.


 


DESCRIPTION OF PROCEDURE:


The patient was brought to the cardiac catheterization lab in a 


fasting state.  The right groin was prepped and draped in sterile 


fashion.  Using 1% lidocaine for local anesthesia and a single stick 


for each, 6-French sheath was inserted in the right femoral artery and


right femoral vein.  Right heart catheterization was then performed in


standard fashion using a [place]-Silver catheter. Coronary angiography was 


then completed using left 5 and 3DRC catheters.  Angiography was then 


obtained of the right femoral artery via the sheath, followed by 


uncomplicated Angio-Seal closure.  There were no complications.


 


CONTRAST:


50 mL


 


BLOOD LOSS:


5 mL


 


FINDINGS:


1.  Hemodynamics:  Right atrial pressure was 19/15 with a mean of 13. 


Right ventricular pressure 46/10 with an end diastolic pressure of 13.


 Pulmonary artery pressure 45/19 with a mean of 33.  Pulmonary 


capillary wedge pressure 34/34 with a mean of 26. Aortic pressure 


100/48 with a mean of 68.  Saturations obtained in the pulmonary 


artery was 51.5%.  Saturations in the aorta were 83%.


2.  Coronary angiography:  Coronary circulation was left dominant.  


The right coronary artery is totally occluded proximally with left to 


right collaterals.  Left coronary artery demonstrates irregularities 


throughout. The distal circumflex in 1 view after 2 major marginal 


branches may have as much as a 50% focal stenosis, but had normal ERVIN


3 flow.


 


CONCLUSIONS:


1.  The patient is known to have severe aortic stenosis.


2.  Total occlusion of a nondominant right coronary artery that is 


collateralized and a 50% stenosis of the distal circumflex.  This 


could be managed medically if TAVR would be better option for him.


 


RECOMMENDATIONS:


Consider TAVR versus AVR.  We will consult Dr. Baxter.


Medications and IVs





Current Medications


Sodium Chloride (NS Flush) 2 ml UNSCH  PRN IVF FLUSH AFTER USING IV ACCESS Last 

administered on 4/1/18at 08:57;  Start 3/30/18 at 15:15;  Stop 4/1/18 at 10:47;

  Status DC


Furosemide (Lasix Inj) 80 mg ONCE  ONCE IVP  Last administered on 3/30/18at 15:

10;  Start 3/30/18 at 15:15;  Stop 3/30/18 at 15:16;  Status DC


Albuterol/ Ipratropium (Duoneb Neb) 1 ampule Q15M INH  Last administered on 3/30

/18at 15:46;  Start 3/30/18 at 15:15;  Stop 3/30/18 at 15:46;  Status DC


Nitroglycerin (Nitroglycerin 2% Oint) 1 inch ONCE  ONCE TOPICAL  Last 

administered on 3/30/18at 15:10;  Start 3/30/18 at 15:15;  Stop 3/30/18 at 15:16

;  Status DC


Ondansetron HCl (Zofran Inj) 4 mg Q6H  PRN IVP NAUSEA OR VOMITING;  Start 3/30/

18 at 17:30;  Stop 4/2/18 at 15:27;  Status DC


Heparin Sodium (Porcine) (Heparin Inj) 5,000 units Q8H SQ  Last administered on 

4/1/18at 01:36;  Start 3/30/18 at 17:30;  Status Future Hold


Acetaminophen/ Hydrocodone Bitart (Norco  5-325 Mg) 1 tab Q4H  PRN PO PAIN 

SCALE 3 TO 5;  Start 3/30/18 at 17:30


Acetaminophen/ Hydrocodone Bitart (Norco  7.5-325 Mg) 1 tab Q4H  PRN PO PAIN 

SCALE 6 TO 10;  Start 3/30/18 at 17:30


Morphine Sulfate (Morphine Inj) 4 mg Q3H  PRN IV PUSH BREAKTHROUGH PAIN;  Start 

3/30/18 at 17:30


Naloxone HCl (Narcan Inj) 0.4 mg UNSCH  PRN IV PUSH SEE LABEL COMMENTS;  Start 3

/30/18 at 17:30


Senna/Docusate Sodium (Arielle-Colace) 1 tab BID PO  Last administered on 4/5/18at 

08:56;  Start 3/30/18 at 21:00


Magnesium Hydroxide (Milk Of Magnesia Liq) 30 ml Q12H  PRN PO Mild constipation 

Last administered on 4/2/18at 13:06;  Start 3/30/18 at 17:30


Sennosides (Senokot) 17.2 mg Q12H  PRN PO Moderate constipation;  Start 3/30/18 

at 17:30


Furosemide (Lasix Inj) 40 mg DAILY@0700,1900 IV PUSH  Last administered on 4/3/

18at 18:31;  Start 3/31/18 at 07:00;  Stop 4/3/18 at 22:47;  Status DC


Atorvastatin Calcium (Lipitor) 40 mg DAILY PO  Last administered on 4/5/18at 08:

56;  Start 3/31/18 at 09:00


Cyanocobalamin (Vitamin B12) 1,000 mcg DAILY PO  Last administered on 4/5/18at 

08:57;  Start 3/31/18 at 09:00


Ferrous Sulfate (Ferrous Sulfate) 325 mg DAILY PO  Last administered on 4/5/ 18at 08:56;  Start 3/31/18 at 09:00


Lisinopril (Prinivil) 5 mg DAILY PO ;  Start 3/31/18 at 09:00;  Stop 3/31/18 at 

09:52;  Status DC


Carvedilol (Coreg) 3.125 mg Q12HR PO  Last administered on 4/5/18at 08:56;  

Start 3/31/18 at 09:00


Dextrose (D50w (Vial) Inj) 50 ml UNSCH  PRN IV PUSH HYPOGLYCEMIA-SEE COMMENTS;  

Start 3/31/18 at 14:00


Glucagon (Glucagon Inj) 1 mg UNSCH  PRN OTHER HYPOGLYCEMIA-SEE COMMENTS;  Start 

3/31/18 at 14:00


Insulin Aspart (NovoLOG SUPPLEMENTAL SCALE) 1 ACHS SLIDING  SCALE SQ  Last 

administered on 4/4/18at 20:30;  Start 3/31/18 at 17:00


Pantoprazole Sodium (Protonix Inj) 40 mg Q12H IV PUSH  Last administered on 4/3/

18at 15:31;  Start 4/1/18 at 02:00;  Stop 4/3/18 at 22:47;  Status DC


Sodium Chloride 1,000 ml @  100 mls/hr Q10H IV  Last administered on 4/1/18at 09

:00;  Start 4/1/18 at 08:55;  Stop 4/1/18 at 16:01;  Status DC


Diphenhydramine HCl (Benadryl) 25 mg ON  CALL PO  Last administered on 4/1/18at 

09:23;  Start 4/1/18 at 09:00;  Stop 4/5/18 at 08:59;  Status DC


Diazepam (Valium) 5 mg ON  CALL PO  Last administered on 4/1/18at 09:23;  Start 

4/1/18 at 09:00;  Stop 4/5/18 at 08:59;  Status DC


Midazolam HCl (Versed Inj) 2 mg STK-MED ONCE .ROUTE  Last administered on 4/1/ 18at 10:00;  Start 4/1/18 at 09:39;  Stop 4/1/18 at 09:40;  Status DC


Heparin Sodium/ Sodium Chloride 2,000 ml @  As Directed STK-MED ONCE IV FLUSH ;

  Start 4/1/18 at 09:41;  Stop 4/1/18 at 09:42;  Status DC


Sodium Chloride 1,000 ml @  100 mls/hr Q10H IV  Last administered on 4/1/18at 11

:05;  Start 4/1/18 at 10:43;  Stop 4/1/18 at 16:01;  Status DC


Sodium Chloride (NS Flush) 2 ml BID IV FLUSH  Last administered on 4/5/18at 08:

55;  Start 4/1/18 at 21:00


Sodium Chloride (NS Flush) 2 ml UNSCH  PRN IV FLUSH FLUSH AFTER USING IV ACCESS

;  Start 4/1/18 at 10:45


Miscellaneous Information 1 ONCE  ONCE XX ;  Start 4/1/18 at 10:45;  Stop 4/1/ 18 at 11:04;  Status DC


Ondansetron HCl (Zofran Inj) 4 mg Q4H  PRN IV PUSH NAUSEA;  Start 4/1/18 at 10:

45


Bacitracin (Bacitracin Oint Packet) 0.9 gm ONCE  ONCE TOP  Last administered on 

4/1/18at 10:55;  Start 4/1/18 at 10:45;  Stop 4/1/18 at 10:47;  Status DC


Aspirin (Ecotrin Ec) 81 mg DAILY PO  Last administered on 4/5/18at 08:56;  

Start 4/2/18 at 09:00


Iohexol (OMNIPAQUE 350 INJ (Cath Lab)) 50 ml STK-MED ONCE OTHER ;  Start 3/30/

18 at 16:43;  Stop 4/2/18 at 08:00;  Status DC


Lactulose (Lactulose Liq) 30 ml DAILY PO  Last administered on 4/5/18at 08:55;  

Start 4/2/18 at 15:30


Furosemide (Lasix) 40 mg DAILY PO  Last administered on 4/5/18at 08:56;  Start 4 /4/18 at 09:00


Pantoprazole Sodium (Protonix) 40 mg DAILY PO  Last administered on 4/5/18at 08:

56;  Start 4/4/18 at 09:00


Clopidogrel Bisulfate (Plavix) 600 mg ONCE  ONCE PO  Last administered on 4/4/ 18at 13:17;  Start 4/4/18 at 13:00;  Stop 4/4/18 at 13:01;  Status DC


Clopidogrel Bisulfate (Plavix) 75 mg DAILY PO  Last administered on 4/5/18at 08:

57;  Start 4/5/18 at 09:00


Potassium Chloride (KCl) 60 meq ONCE  ONCE PO  Last administered on 4/4/18at 13:

17;  Start 4/4/18 at 12:45;  Stop 4/4/18 at 12:46;  Status DC


Potassium Chloride (KCl) 20 meq DAILY PO  Last administered on 4/5/18at 08:57;  

Start 4/5/18 at 09:00





A/P


Assessment and Plan


77-year-old male with hypertension, dyslipidemia, aortic stenosis, CHF, CAD, CKD

, gout and diabetes admitted with three-day history of worsening shortness of 

breath and bilateral lower extremity edema.





Acute on chronic systolic CHF, decompensated


Last echo 2/11/18 with EF 45-50%, mild LVH, severe aortic stenosis


   -CXR shows right basilar consolidation/effusion with some atelectatic 

changes in the left base


   -BNP 4996


   -Continue on IV Lasix for diuresis


   -Monitor I&Os


   -Fluid/salt restricted diet


   -CHF teaching


   -Continue on Coreg


   -continue to monitor volume status


   -Continue supplemental oxygen to maintain O2 sats above 92%, patient weaned 

off to room air


   Started on Plavix to see if there is any bleeding





CAD, ?previous stenting


Severe aortic stenosis


   -Cardiology following, appreciate assistance.  Repeat echocardiogram 

confirms severity of AS.  Status post heart catheterization, recommend medical 

management and CT surgery consulted to consider TAVR   


   -Dr. Baxter evaluated the patient. Sobeida Baez consulted for second opinion. 


   Started on Plavix to see if there is any bleeding





GIB


History of recent GI bleed requiring transfusion 3 units PRBCs


   -s/p EGD/colonoscopy 2/24/18, irregular z line, mild gastritis, multiple 

polyps


   -no significant source of bleeding identified


   -Hemoccult positive this admission


   -hold Heparin, started on Protonix


   -H&H stable and improved.


   Started on Plavix to see if there is any bleeding





Hypertension, chronic, controlled


   -Continue on Coreg but at decreased dose with holding parameters


   -continue to monitor BP and adjust accordingly





JOSIE on Suspected CKD stage III


   -baseline creatinine likely around 1.3-1.4


   -creatinine up while on IV Lasix. Transition to oral Lasix. 


   -avoid nephrotoxic agents


   -continue to monitor renal indices





Diabetes


   -Continue to hold metformin


   -Accu-Cheks and insulin sliding scale


   -Hemoglobin A1C 4.8





Iron deficiency anemia, secondary to GIB


   -Resume home dose of iron daily


   -Hemoglobin stable. 





Gout


   -no exacerbation


   -monitor





Hypokalemia will replace 





DVT prophylaxis


   -sq Heparin on hold secondary to ?GIB.  Encourage ambulation





HOPEFULLY HOME TOMORROW IF HEMOGLOBIN IS STABLE


Discharge Planning


Pending cardiac clearance











Brad Pinto DO Apr 5, 2018 12:34

## 2018-04-06 VITALS — HEART RATE: 66 BPM

## 2018-04-06 VITALS — HEART RATE: 64 BPM

## 2018-04-06 VITALS
DIASTOLIC BLOOD PRESSURE: 70 MMHG | TEMPERATURE: 98.1 F | OXYGEN SATURATION: 97 % | RESPIRATION RATE: 20 BRPM | HEART RATE: 71 BPM | SYSTOLIC BLOOD PRESSURE: 123 MMHG

## 2018-04-06 VITALS
RESPIRATION RATE: 18 BRPM | HEART RATE: 66 BPM | OXYGEN SATURATION: 99 % | DIASTOLIC BLOOD PRESSURE: 67 MMHG | TEMPERATURE: 97.8 F | SYSTOLIC BLOOD PRESSURE: 125 MMHG

## 2018-04-06 VITALS
HEART RATE: 70 BPM | SYSTOLIC BLOOD PRESSURE: 133 MMHG | OXYGEN SATURATION: 99 % | DIASTOLIC BLOOD PRESSURE: 64 MMHG | RESPIRATION RATE: 18 BRPM | TEMPERATURE: 98.1 F

## 2018-04-06 VITALS — HEART RATE: 73 BPM

## 2018-04-06 VITALS — HEART RATE: 70 BPM

## 2018-04-06 VITALS — HEART RATE: 74 BPM

## 2018-04-06 VITALS
TEMPERATURE: 97.4 F | DIASTOLIC BLOOD PRESSURE: 50 MMHG | HEART RATE: 69 BPM | OXYGEN SATURATION: 98 % | RESPIRATION RATE: 20 BRPM | SYSTOLIC BLOOD PRESSURE: 107 MMHG

## 2018-04-06 VITALS — HEART RATE: 67 BPM

## 2018-04-06 VITALS — OXYGEN SATURATION: 99 %

## 2018-04-06 VITALS — HEART RATE: 68 BPM

## 2018-04-06 LAB
ALBUMIN SERPL-MCNC: 3.7 GM/DL (ref 3.4–5)
ALP SERPL-CCNC: 93 U/L (ref 45–117)
ALT SERPL-CCNC: 20 U/L (ref 12–78)
AST SERPL-CCNC: 16 U/L (ref 15–37)
BASOPHILS # BLD AUTO: 0.1 TH/MM3 (ref 0–0.2)
BASOPHILS NFR BLD: 0.9 % (ref 0–2)
BILIRUB SERPL-MCNC: 0.5 MG/DL (ref 0.2–1)
BUN SERPL-MCNC: 27 MG/DL (ref 7–18)
CALCIUM SERPL-MCNC: 8.8 MG/DL (ref 8.5–10.1)
CHLORIDE SERPL-SCNC: 103 MEQ/L (ref 98–107)
CREAT SERPL-MCNC: 1.57 MG/DL (ref 0.6–1.3)
EOSINOPHIL # BLD: 0.3 TH/MM3 (ref 0–0.4)
EOSINOPHIL NFR BLD: 3.4 % (ref 0–4)
ERYTHROCYTE [DISTWIDTH] IN BLOOD BY AUTOMATED COUNT: 23 % (ref 11.6–17.2)
GFR SERPLBLD BASED ON 1.73 SQ M-ARVRAT: 43 ML/MIN (ref 89–?)
GLUCOSE SERPL-MCNC: 113 MG/DL (ref 74–106)
HCO3 BLD-SCNC: 29.7 MEQ/L (ref 21–32)
HCT VFR BLD CALC: 32.8 % (ref 39–51)
HGB BLD-MCNC: 10.6 GM/DL (ref 13–17)
LYMPHOCYTES # BLD AUTO: 1 TH/MM3 (ref 1–4.8)
LYMPHOCYTES NFR BLD AUTO: 13.6 % (ref 9–44)
MAGNESIUM SERPL-MCNC: 2.9 MG/DL (ref 1.5–2.5)
MCH RBC QN AUTO: 27.4 PG (ref 27–34)
MCHC RBC AUTO-ENTMCNC: 32.3 % (ref 32–36)
MCV RBC AUTO: 84.8 FL (ref 80–100)
MONOCYTE #: 0.6 TH/MM3 (ref 0–0.9)
MONOCYTES NFR BLD: 8.6 % (ref 0–8)
NEUTROPHILS # BLD AUTO: 5.6 TH/MM3 (ref 1.8–7.7)
NEUTROPHILS NFR BLD AUTO: 73.5 % (ref 16–70)
PHOSPHATE SERPL-MCNC: 2.8 MG/DL (ref 2.5–4.9)
PLATELET # BLD: 216 TH/MM3 (ref 150–450)
PMV BLD AUTO: 9.4 FL (ref 7–11)
PROT SERPL-MCNC: 6.9 GM/DL (ref 6.4–8.2)
RBC # BLD AUTO: 3.86 MIL/MM3 (ref 4.5–5.9)
SODIUM SERPL-SCNC: 139 MEQ/L (ref 136–145)
WBC # BLD AUTO: 7.6 TH/MM3 (ref 4–11)

## 2018-04-06 RX ADMIN — PANTOPRAZOLE SCH MG: 40 TABLET, DELAYED RELEASE ORAL at 09:30

## 2018-04-06 RX ADMIN — INSULIN ASPART SCH: 100 INJECTION, SOLUTION INTRAVENOUS; SUBCUTANEOUS at 12:09

## 2018-04-06 RX ADMIN — FERROUS SULFATE TAB 325 MG (65 MG ELEMENTAL FE) SCH MG: 325 (65 FE) TAB at 09:30

## 2018-04-06 RX ADMIN — MAGNESIUM HYDROXIDE PRN ML: 400 SUSPENSION ORAL at 09:41

## 2018-04-06 RX ADMIN — FUROSEMIDE SCH MG: 40 TABLET ORAL at 09:30

## 2018-04-06 RX ADMIN — ATORVASTATIN CALCIUM SCH MG: 40 TABLET, FILM COATED ORAL at 09:31

## 2018-04-06 RX ADMIN — CARVEDILOL SCH MG: 3.12 TABLET, FILM COATED ORAL at 09:30

## 2018-04-06 RX ADMIN — WATER SCH ML: 1 IRRIGANT IRRIGATION at 09:35

## 2018-04-06 RX ADMIN — Medication SCH ML: at 09:30

## 2018-04-06 RX ADMIN — CYANOCOBALAMIN TAB 1000 MCG SCH MCG: 1000 TAB at 09:30

## 2018-04-06 RX ADMIN — POTASSIUM CHLORIDE SCH MEQ: 20 TABLET, EXTENDED RELEASE ORAL at 09:30

## 2018-04-06 RX ADMIN — WATER SCH ML: 1 IRRIGANT IRRIGATION at 09:30

## 2018-04-06 RX ADMIN — STANDARDIZED SENNA CONCENTRATE AND DOCUSATE SODIUM SCH TAB: 8.6; 5 TABLET, FILM COATED ORAL at 09:31

## 2018-04-06 RX ADMIN — INSULIN ASPART SCH: 100 INJECTION, SOLUTION INTRAVENOUS; SUBCUTANEOUS at 08:00

## 2018-04-06 RX ADMIN — CLOPIDOGREL BISULFATE SCH MG: 75 TABLET, FILM COATED ORAL at 09:30

## 2018-04-06 RX ADMIN — ASPIRIN SCH MG: 81 TABLET ORAL at 09:30

## 2018-04-06 NOTE — HHI.PR
Subjective


Remarks


Patient reports he is feeling ok. No new issues. CT surgery consulted for 

second opinion.





4-4 patient seen by cardiology meds adjusted started back on Plavix will be 

monitored in the hospital for the next few days before being cleared for 

discharge to make sure he can tolerate the Plavix with the future need for TAVR


Patient is refusing insulin


We will need his teeth taken care of before having the TAVR


Monitor in the hospital


Continue physical therapy and Occupational Therapy


Discussed with patient and RN and case management


We will replace his potassium





4-5 STARTED ON PLAVIX YESTERDAY


NO ACTIVE BLEEDING


GI STABLE


WILL CHECK AM LABS


IF STABLE DC ON 4-6 


DW RN AND PT AND CM





4-6 NO BLEEDING


WANTS TO GO HOME


DW RN AND PATIENT


NO BLEEDING


DC TO HOME TODAY


FOLLOW UP AS OUT PT


DW RN AND CM AND PT





Objective


Vitals





Vital Signs








  Date Time  Temp Pulse Resp B/P (MAP) Pulse Ox O2 Delivery O2 Flow Rate FiO2


 


4/6/18 09:47     99   21


 


4/6/18 08:00 97.8 66 18 125/67 (86) 99   


 


4/6/18 05:00  66      


 


4/6/18 04:00  63      


 


4/6/18 04:00 98.1 71 20 123/70 (87) 97   


 


4/6/18 03:00  64      


 


4/6/18 02:00  64      


 


4/6/18 01:00  66      


 


4/6/18 00:00  69      


 


4/6/18 00:00 97.4 67 20 107/50 (69) 98   


 


4/5/18 23:00  70      


 


4/5/18 22:00  68      


 


4/5/18 21:00  72      


 


4/5/18 20:00 98.1 70 20 124/75 (91) 98   


 


4/5/18 20:00  67      


 


4/5/18 19:00  72      


 


4/5/18 18:00  70      


 


4/5/18 17:00  68      


 


4/5/18 16:00  70      


 


4/5/18 15:15  70 20 115/59 (77) 98   


 


4/5/18 15:00  68      


 


4/5/18 14:00  66      


 


4/5/18 13:00  68      


 


4/5/18 12:00  70      


 


4/5/18 12:00 98.7 70 20 123/71 (88) 99   














I/O      


 


 4/5/18 4/5/18 4/5/18 4/6/18 4/6/18 4/6/18





 07:00 15:00 23:00 07:00 15:00 23:00


 


Intake Total 600 ml  820 ml 240 ml  


 


Output Total 650 ml  675 ml 400 ml  


 


Balance -50 ml  145 ml -160 ml  


 


      


 


Intake Oral 600 ml  820 ml 240 ml  


 


Output Urine Total 650 ml  675 ml 400 ml  








Result Diagram:  


4/6/18 0604                                                                    

            4/6/18 0544





Other Results





 Laboratory Tests








Test


  4/4/18


05:01 4/5/18


05:29 4/6/18


05:44 4/6/18


06:04


 


White Blood Count 6.9 TH/MM3  8.5 TH/MM3   7.6 TH/MM3 


 


Red Blood Count 3.86 MIL/MM3  3.89 MIL/MM3   3.86 MIL/MM3 


 


Hemoglobin 10.5 GM/DL  10.6 GM/DL   10.6 GM/DL 


 


Hematocrit 32.4 %  32.5 %   32.8 % 


 


Mean Corpuscular Volume 83.8 FL  83.7 FL   84.8 FL 


 


Mean Corpuscular Hemoglobin 27.2 PG  27.4 PG   27.4 PG 


 


Mean Corpuscular Hemoglobin


Concent 32.4 % 


  32.7 % 


  


  32.3 % 


 


 


Red Cell Distribution Width 23.3 %  22.8 %   23.0 % 


 


Platelet Count 197 TH/MM3  214 TH/MM3   216 TH/MM3 


 


Mean Platelet Volume 9.5 FL  9.5 FL   9.4 FL 


 


Blood Urea Nitrogen 23 MG/DL  24 MG/DL  27 MG/DL  


 


Creatinine 1.54 MG/DL  1.60 MG/DL  1.57 MG/DL  


 


Random Glucose 87 MG/DL  103 MG/DL  113 MG/DL  


 


Calcium Level 8.5 MG/DL  8.8 MG/DL  8.8 MG/DL  


 


Sodium Level 141 MEQ/L  138 MEQ/L  139 MEQ/L  


 


Potassium Level 3.4 MEQ/L  4.2 MEQ/L  4.4 MEQ/L  


 


Chloride Level 102 MEQ/L  101 MEQ/L  103 MEQ/L  


 


Carbon Dioxide Level 30.3 MEQ/L  28.7 MEQ/L  29.7 MEQ/L  


 


Anion Gap 9 MEQ/L  8 MEQ/L  6 MEQ/L  


 


Estimat Glomerular Filtration


Rate 44 ML/MIN 


  42 ML/MIN 


  43 ML/MIN 


  


 


 


Neutrophils (%) (Auto)  77.0 %   73.5 % 


 


Lymphocytes (%) (Auto)  10.9 %   13.6 % 


 


Monocytes (%) (Auto)  8.2 %   8.6 % 


 


Eosinophils (%) (Auto)  3.2 %   3.4 % 


 


Basophils (%) (Auto)  0.7 %   0.9 % 


 


Neutrophils # (Auto)  6.6 TH/MM3   5.6 TH/MM3 


 


Lymphocytes # (Auto)  0.9 TH/MM3   1.0 TH/MM3 


 


Monocytes # (Auto)  0.7 TH/MM3   0.6 TH/MM3 


 


Eosinophils # (Auto)  0.3 TH/MM3   0.3 TH/MM3 


 


Basophils # (Auto)  0.1 TH/MM3   0.1 TH/MM3 


 


CBC Comment  DIFF FINAL   DIFF FINAL 


 


Differential Comment      


 


Total Protein  6.9 GM/DL  6.9 GM/DL  


 


Albumin  3.7 GM/DL  3.7 GM/DL  


 


Phosphorus Level  3.4 MG/DL  2.8 MG/DL  


 


Magnesium Level  2.5 MG/DL  2.9 MG/DL  


 


Alkaline Phosphatase  90 U/L  93 U/L  


 


Aspartate Amino Transf


(AST/SGOT) 


  17 U/L 


  16 U/L 


  


 


 


Alanine Aminotransferase


(ALT/SGPT) 


  22 U/L 


  20 U/L 


  


 


 


Total Bilirubin  0.6 MG/DL  0.5 MG/DL  


 


Hemoglobin A1c  4.9 %   


 


Free Thyroxine  1.08 NG/DL   


 


Thyroid Stimulating Hormone


3rd Gen 


  2.070 uIU/ML 


  


  


 








Imaging





Last Impressions








Chest X-Ray 3/30/18 1503 Signed





Impressions: 





 Service Date/Time:  Friday, March 30, 2018 15:32 - CONCLUSION:  1. Right 

basilar 





 consolidation with possible small effusion. 2. Mild atelectatic changes of the 





 left hemidiaphragm. 3. Slight interstitial prominence which could represent 

some 





 degree of vascular congestion or volume overload. Heart size is borderline 





 prominent.     Gal Mayfield MD 








Objective Remarks


GENERAL: Awake alert and oriented 3 talkative and cooperative


SKIN: Warm and dry.


HEAD: Atraumatic. Normocephalic. 


EYES: Pupils equal and round. No scleral icterus. No injection or drainage.  

Extraocular muscles intact


ENT: No nasal bleeding or discharge.  Mucous membranes pink and moist.  Tongue 

is midline


NECK: Trachea midline. No JVD.  Supple


CARDIOVASCULAR: Regular rate and rhythm.  S1-S2 no S3 or S4 3/6 systolic 

ejection murmur


RESPIRATORY: No accessory muscle use. Clear to auscultation. Breath sounds 

equal bilaterally. 


GASTROINTESTINAL: Abdomen soft, non-tender, nondistended. Hepatic and splenic 

margins not palpable. 


MUSCULOSKELETAL: Extremities without clubbing, cyanosis, or edema. No obvious 

deformities. 


NEUROLOGICAL: Awake and alert. No obvious cranial nerve deficits.  Motor 

grossly within normal limits. Five out of 5 muscle strength in the arms and 

legs.  Normal speech.


PSYCHIATRIC: INAppropriate mood and affect; insight and judgment ABnormal.


Procedures


04/01/2018


 


PROCEDURES PERFORMED:


Right heart catheterization, coronary angiography, right femoral 


angiography with Angio-Seal placement.


 


BRIEF HISTORY:


Tang Cho is a 77-year-old male with symptomatic aortic stenosis. 


He had an echo performed at [facility] and an echo performed 


in the hospital, each of which have shown a mean gradient in excess of


50 mmHg.


 


DESCRIPTION OF PROCEDURE:


The patient was brought to the cardiac catheterization lab in a 


fasting state.  The right groin was prepped and draped in sterile 


fashion.  Using 1% lidocaine for local anesthesia and a single stick 


for each, 6-French sheath was inserted in the right femoral artery and


right femoral vein.  Right heart catheterization was then performed in


standard fashion using a [place] catheter. Coronary angiography was 


then completed using left 5 and 3DRC catheters.  Angiography was then 


obtained of the right femoral artery via the sheath, followed by 


uncomplicated Angio-Seal closure.  There were no complications.


 


CONTRAST:


50 mL


 


BLOOD LOSS:


5 mL


 


FINDINGS:


1.  Hemodynamics:  Right atrial pressure was 19/15 with a mean of 13. 


Right ventricular pressure 46/10 with an end diastolic pressure of 13.


 Pulmonary artery pressure 45/19 with a mean of 33.  Pulmonary 


capillary wedge pressure 34/34 with a mean of 26. Aortic pressure 


100/48 with a mean of 68.  Saturations obtained in the pulmonary 


artery was 51.5%.  Saturations in the aorta were 83%.


2.  Coronary angiography:  Coronary circulation was left dominant.  


The right coronary artery is totally occluded proximally with left to 


right collaterals.  Left coronary artery demonstrates irregularities 


throughout. The distal circumflex in 1 view after 2 major marginal 


branches may have as much as a 50% focal stenosis, but had normal ERVIN


3 flow.


 


CONCLUSIONS:


1.  The patient is known to have severe aortic stenosis.


2.  Total occlusion of a nondominant right coronary artery that is 


collateralized and a 50% stenosis of the distal circumflex.  This 


could be managed medically if TAVR would be better option for him.


 


RECOMMENDATIONS:


Consider TAVR versus AVR.  We will consult Dr. Baxter.


Medications and IVs





Current Medications


Sodium Chloride (NS Flush) 2 ml UNSCH  PRN IVF FLUSH AFTER USING IV ACCESS Last 

administered on 4/1/18at 08:57;  Start 3/30/18 at 15:15;  Stop 4/1/18 at 10:47;

  Status DC


Furosemide (Lasix Inj) 80 mg ONCE  ONCE IVP  Last administered on 3/30/18at 15:

10;  Start 3/30/18 at 15:15;  Stop 3/30/18 at 15:16;  Status DC


Albuterol/ Ipratropium (Duoneb Neb) 1 ampule Q15M INH  Last administered on 3/30

/18at 15:46;  Start 3/30/18 at 15:15;  Stop 3/30/18 at 15:46;  Status DC


Nitroglycerin (Nitroglycerin 2% Oint) 1 inch ONCE  ONCE TOPICAL  Last 

administered on 3/30/18at 15:10;  Start 3/30/18 at 15:15;  Stop 3/30/18 at 15:16

;  Status DC


Ondansetron HCl (Zofran Inj) 4 mg Q6H  PRN IVP NAUSEA OR VOMITING;  Start 3/30/

18 at 17:30;  Stop 4/2/18 at 15:27;  Status DC


Heparin Sodium (Porcine) (Heparin Inj) 5,000 units Q8H SQ  Last administered on 

4/1/18at 01:36;  Start 3/30/18 at 17:30;  Status Future Hold


Acetaminophen/ Hydrocodone Bitart (Norco  5-325 Mg) 1 tab Q4H  PRN PO PAIN 

SCALE 3 TO 5;  Start 3/30/18 at 17:30


Acetaminophen/ Hydrocodone Bitart (Norco  7.5-325 Mg) 1 tab Q4H  PRN PO PAIN 

SCALE 6 TO 10;  Start 3/30/18 at 17:30


Morphine Sulfate (Morphine Inj) 4 mg Q3H  PRN IV PUSH BREAKTHROUGH PAIN;  Start 

3/30/18 at 17:30


Naloxone HCl (Narcan Inj) 0.4 mg UNSCH  PRN IV PUSH SEE LABEL COMMENTS;  Start 3

/30/18 at 17:30


Senna/Docusate Sodium (Arielle-Colace) 1 tab BID PO  Last administered on 4/6/18at 

09:31;  Start 3/30/18 at 21:00


Magnesium Hydroxide (Milk Of Magnesia Liq) 30 ml Q12H  PRN PO Mild constipation 

Last administered on 4/6/18at 09:41;  Start 3/30/18 at 17:30


Sennosides (Senokot) 17.2 mg Q12H  PRN PO Moderate constipation;  Start 3/30/18 

at 17:30


Furosemide (Lasix Inj) 40 mg DAILY@0700,1900 IV PUSH  Last administered on 4/3/

18at 18:31;  Start 3/31/18 at 07:00;  Stop 4/3/18 at 22:47;  Status DC


Atorvastatin Calcium (Lipitor) 40 mg DAILY PO  Last administered on 4/6/18at 09:

31;  Start 3/31/18 at 09:00


Cyanocobalamin (Vitamin B12) 1,000 mcg DAILY PO  Last administered on 4/6/18at 

09:30;  Start 3/31/18 at 09:00


Ferrous Sulfate (Ferrous Sulfate) 325 mg DAILY PO  Last administered on 4/6/ 18at 09:30;  Start 3/31/18 at 09:00


Lisinopril (Prinivil) 5 mg DAILY PO ;  Start 3/31/18 at 09:00;  Stop 3/31/18 at 

09:52;  Status DC


Carvedilol (Coreg) 3.125 mg Q12HR PO  Last administered on 4/6/18at 09:30;  

Start 3/31/18 at 09:00


Dextrose (D50w (Vial) Inj) 50 ml UNSCH  PRN IV PUSH HYPOGLYCEMIA-SEE COMMENTS;  

Start 3/31/18 at 14:00


Glucagon (Glucagon Inj) 1 mg UNSCH  PRN OTHER HYPOGLYCEMIA-SEE COMMENTS;  Start 

3/31/18 at 14:00


Insulin Aspart (NovoLOG SUPPLEMENTAL SCALE) 1 ACHS SLIDING  SCALE SQ  Last 

administered on 4/4/18at 20:30;  Start 3/31/18 at 17:00


Pantoprazole Sodium (Protonix Inj) 40 mg Q12H IV PUSH  Last administered on 4/3/

18at 15:31;  Start 4/1/18 at 02:00;  Stop 4/3/18 at 22:47;  Status DC


Sodium Chloride 1,000 ml @  100 mls/hr Q10H IV  Last administered on 4/1/18at 09

:00;  Start 4/1/18 at 08:55;  Stop 4/1/18 at 16:01;  Status DC


Diphenhydramine HCl (Benadryl) 25 mg ON  CALL PO  Last administered on 4/1/18at 

09:23;  Start 4/1/18 at 09:00;  Stop 4/5/18 at 08:59;  Status DC


Diazepam (Valium) 5 mg ON  CALL PO  Last administered on 4/1/18at 09:23;  Start 

4/1/18 at 09:00;  Stop 4/5/18 at 08:59;  Status DC


Midazolam HCl (Versed Inj) 2 mg STK-MED ONCE .ROUTE  Last administered on 4/1/ 18at 10:00;  Start 4/1/18 at 09:39;  Stop 4/1/18 at 09:40;  Status DC


Heparin Sodium/ Sodium Chloride 2,000 ml @  As Directed STK-MED ONCE IV FLUSH ;

  Start 4/1/18 at 09:41;  Stop 4/1/18 at 09:42;  Status DC


Sodium Chloride 1,000 ml @  100 mls/hr Q10H IV  Last administered on 4/1/18at 11

:05;  Start 4/1/18 at 10:43;  Stop 4/1/18 at 16:01;  Status DC


Sodium Chloride (NS Flush) 2 ml BID IV FLUSH  Last administered on 4/6/18at 09:

30;  Start 4/1/18 at 21:00


Sodium Chloride (NS Flush) 2 ml UNSCH  PRN IV FLUSH FLUSH AFTER USING IV ACCESS

;  Start 4/1/18 at 10:45


Miscellaneous Information 1 ONCE  ONCE XX ;  Start 4/1/18 at 10:45;  Stop 4/1/ 18 at 11:04;  Status DC


Ondansetron HCl (Zofran Inj) 4 mg Q4H  PRN IV PUSH NAUSEA;  Start 4/1/18 at 10:

45


Bacitracin (Bacitracin Oint Packet) 0.9 gm ONCE  ONCE TOP  Last administered on 

4/1/18at 10:55;  Start 4/1/18 at 10:45;  Stop 4/1/18 at 10:47;  Status DC


Aspirin (Ecotrin Ec) 81 mg DAILY PO  Last administered on 4/6/18at 09:30;  

Start 4/2/18 at 09:00


Iohexol (OMNIPAQUE 350 INJ (Cath Lab)) 50 ml STK-MED ONCE OTHER ;  Start 3/30/

18 at 16:43;  Stop 4/2/18 at 08:00;  Status DC


Lactulose (Lactulose Liq) 30 ml DAILY PO  Last administered on 4/5/18at 08:55;  

Start 4/2/18 at 15:30


Furosemide (Lasix) 40 mg DAILY PO  Last administered on 4/6/18at 09:30;  Start 4 /4/18 at 09:00


Pantoprazole Sodium (Protonix) 40 mg DAILY PO  Last administered on 4/6/18at 09:

30;  Start 4/4/18 at 09:00


Clopidogrel Bisulfate (Plavix) 600 mg ONCE  ONCE PO  Last administered on 4/4/ 18at 13:17;  Start 4/4/18 at 13:00;  Stop 4/4/18 at 13:01;  Status DC


Clopidogrel Bisulfate (Plavix) 75 mg DAILY PO  Last administered on 4/6/18at 09:

30;  Start 4/5/18 at 09:00


Potassium Chloride (KCl) 60 meq ONCE  ONCE PO  Last administered on 4/4/18at 13:

17;  Start 4/4/18 at 12:45;  Stop 4/4/18 at 12:46;  Status DC


Potassium Chloride (KCl) 20 meq DAILY PO  Last administered on 4/6/18at 09:30;  

Start 4/5/18 at 09:00


Naproxen (Naprosyn) 250 mg Q12H  PRN PO gout pain Last administered on 4/5/18at 

17:29;  Start 4/5/18 at 15:15


Sodium Chloride 1,000 ml @  100 mls/hr Q10H IV  Last administered on 4/6/18at 09

:31;  Start 4/6/18 at 09:15;  Stop 4/6/18 at 13:14





A/P


Assessment and Plan


77-year-old male with hypertension, dyslipidemia, aortic stenosis, CHF, CAD, CKD

, gout and diabetes admitted with three-day history of worsening shortness of 

breath and bilateral lower extremity edema.





Acute on chronic systolic CHF, decompensated


Last echo 2/11/18 with EF 45-50%, mild LVH, severe aortic stenosis


   -CXR shows right basilar consolidation/effusion with some atelectatic 

changes in the left base


   -BNP 4996


   -Continue on IV Lasix for diuresis


   -Monitor I&Os


   -Fluid/salt restricted diet


   -CHF teaching


   -Continue on Coreg


   -continue to monitor volume status


   -Continue supplemental oxygen to maintain O2 sats above 92%, patient weaned 

off to room air


   Started on Plavix to see if there is any bleeding





CAD, ?previous stenting


Severe aortic stenosis


   -Cardiology following, appreciate assistance.  Repeat echocardiogram 

confirms severity of AS.  Status post heart catheterization, recommend medical 

management and CT surgery consulted to consider TAVR   


   -Dr. Baxter evaluated the patient. Sobeida Baez consulted for second opinion. 


   Started on Plavix to see if there is any bleeding





GIB


History of recent GI bleed requiring transfusion 3 units PRBCs


   -s/p EGD/colonoscopy 2/24/18, irregular z line, mild gastritis, multiple 

polyps


   -no significant source of bleeding identified


   -Hemoccult positive this admission


   -hold Heparin, started on Protonix


   -H&H stable and improved.


   Started on Plavix to see if there is any bleeding





Hypertension, chronic, controlled


   -Continue on Coreg but at decreased dose with holding parameters


   -continue to monitor BP and adjust accordingly





JOSIE on Suspected CKD stage III


   -baseline creatinine likely around 1.3-1.4


   -creatinine up while on IV Lasix. Transition to oral Lasix. 


   -avoid nephrotoxic agents


   -continue to monitor renal indices





Diabetes


   -Continue to hold metformin


   -Accu-Cheks and insulin sliding scale


   -Hemoglobin A1C 4.8





Iron deficiency anemia, secondary to GIB


   -Resume home dose of iron daily


   -Hemoglobin stable. 





Gout


   -no exacerbation


   -monitor





Hypokalemia will replace 





DVT prophylaxis


   -sq Heparin on hold secondary to ?GIB.  Encourage ambulation





SWITCH TO PO MEDS





DC TO HOME TODAY


Discharge Planning


DC TO HOME TODAY











Brad Pinto DO Apr 6, 2018 11:55

## 2018-04-06 NOTE — RADRPT
EXAM DATE/TIME:  04/06/2018 12:48 

 

[facility] COMPARISON:     

No previous studies available for comparison.

 

 

INDICATIONS :     

Pre op TAVR.

                      

 

IV CONTRAST:     

90 cc Visipaque (iodixanol) IV 

 

 

RADIATION DOSE:     

38.75 CTDIvol (mGy) 

 

 

MEDICAL HISTORY :     

Hypertension. Cardiovascular disease Diabetes mellitus type 2.

 

SURGICAL HISTORY :      

None. 

 

ENCOUNTER:      

Initial

 

ACUITY:      

1 day

 

PAIN SCALE:      

0/10

 

LOCATION:        

chest 

 

TECHNIQUE:     

Volumetric scanning was performed using a multi-row detector CT scanner.  The data was post processed
 with a variety of visualization algorithms including full volume maximum intensity projection, multi
-planar sliding thin slab reformation, curved planar reformation, and surface rendering techniques.  
Using automated exposure control and adjustment of the mA and/or kV according to patient size, radiat
ion dose was kept as low as reasonably achievable to obtain optimal diagnostic quality images.    DIC
OM format image data is available electronically for review and comparison.  

 

 

FINDINGS:     

 

CARDIAC:     

The coronary system is left dominant. Scattered calcified atheromatous plaque throughout the coronary
 arteries. Most abundant within the circumflex. There is no pericardial effusion

 

AORTIC ROOT/VALVE:     

3 cusps are evident with significant calcifications.   

     The sinus of Valsalva measures 3 cm, sinotubular junction 2.6 cm, and mid tubular 3.4 cm. Scatte
red atherosclerotic calcifications.

     

 

THORACIC AORTA:     

Origin of the great vessels is normal. Atherosclerotic plaque involving the origins without stenosis.
 No evidence of aneurysm, mural thrombus, dissection, or stenosis. Note is made of a small ductus bum
p.  

     

 

ABDOMINAL AORTA:     

No evidence of aneurysm, mural thrombus, dissection, or stenosis. Diffuse atherosclerotic plaque most
 pronounced within the infrarenal aspect.  

 

CELIAC ARTERY:     

Calcified plaque involving the celiac origin without significant stenosis.

 

SMA:     

Calcified plaque involving the SMA origin without significant stenosis.

 

RIGHT RENAL ARTERY:

Right renal artery is widely patent despite calcified plaque.

 

LEFT RENAL ARTERY:     

Left renal artery is widely patent.

 

RIGHT COMMON ILIAC: 

No evidence of aneurysm, mural thrombus, dissection, , or stenosis.  Calcified plaque is noted. The c
ommon femoral measures 6 mm.

 

LEFT COMMON ILIAC:     

No evidence of aneurysm, mural thrombus, dissection,  or stenosis.  Calcified plaque noted. The commo
n femoral measures 6 mm.

 

THORAX:     

Calcified granulomas throughout the right lung. Scattered areas of atelectasis bilaterally.

 

ABDOMEN:          

Small solitary calcified gallstone within an otherwise normal-appearing gallbladder. Scattered areas 
of cortical scarring involving both kidneys. Small cortical renal cysts bilaterally. The largest is o
n the right measuring 2.4 cm.

 

PELVIS:     

The prostate gland is at the upper range of normal in terms of size. No discrete mass. No mass or magaly
nopathy involving the pelvis.

 

CONCLUSION:     

Calcified plaque involving the coronary arteries bilaterally as well as the aortic valve cusps.

 

 

 

 Grey See Jr., MD on April 06, 2018 at 14:09           

Board Certified Radiologist.

 This report was verified electronically.

## 2018-04-06 NOTE — HHI.DS
__________________________________________________





Discharge Summary


Admission Date


Apr 2, 2018 at 16:12


Discharge Date:  Apr 6, 2018


Admitting Diagnosis





CHF





(1) poor dentation


(2) Severe anemia


ICD Code:  D64.9 - Anemia, unspecified


Diagnosis:  Principal


Status:  Acute


(3) JOSIE (acute kidney injury)


ICD Code:  N17.9 - Acute kidney failure, unspecified


Diagnosis:  Secondary





(4) Ischemic cardiomyopathy


ICD Code:  I25.5 - Ischemic cardiomyopathy


Diagnosis:  Principal





(5) CAD (coronary artery disease)


ICD Code:  I25.10 - Atherosclerotic heart disease of native coronary artery 

without angina pectoris


Diagnosis:  Secondary


Status:  Chronic


(6) Aortic stenosis


ICD Code:  I35.0 - Nonrheumatic aortic (valve) stenosis


Diagnosis:  Principal


Status:  Chronic


(7) Diabetes mellitus


ICD Code:  E11.9 - Type 2 diabetes mellitus without complications


Diagnosis:  Principal





(8) CHF (congestive heart failure)


ICD Code:  I50.9 - Heart failure, unspecified


Diagnosis:  Principal


Status:  Acute


Procedures


04/01/2018


 


PROCEDURES PERFORMED:


Right heart catheterization, coronary angiography, right femoral 


angiography with Angio-Seal placement.


 


BRIEF HISTORY:


Tang Cho is a 77-year-old male with symptomatic aortic stenosis. 


He had an echo performed at [facility] and an echo performed 


in the hospital, each of which have shown a mean gradient in excess of


50 mmHg.


 


DESCRIPTION OF PROCEDURE:


The patient was brought to the cardiac catheterization lab in a 


fasting state.  The right groin was prepped and draped in sterile 


fashion.  Using 1% lidocaine for local anesthesia and a single stick 


for each, 6-Portuguese sheath was inserted in the right femoral artery and


right femoral vein.  Right heart catheterization was then performed in


standard fashion using a Seville-Silver catheter. Coronary angiography was 


then completed using left 5 and 3DRC catheters.  Angiography was then 


obtained of the right femoral artery via the sheath, followed by 


uncomplicated Angio-Seal closure.  There were no complications.


 


CONTRAST:


50 mL


 


BLOOD LOSS:


5 mL


 


FINDINGS:


1.  Hemodynamics:  Right atrial pressure was 19/15 with a mean of 13. 


Right ventricular pressure 46/10 with an end diastolic pressure of 13.


 Pulmonary artery pressure 45/19 with a mean of 33.  Pulmonary 


capillary wedge pressure 34/34 with a mean of 26. Aortic pressure 


100/48 with a mean of 68.  Saturations obtained in the pulmonary 


artery was 51.5%.  Saturations in the aorta were 83%.


2.  Coronary angiography:  Coronary circulation was left dominant.  


The right coronary artery is totally occluded proximally with left to 


right collaterals.  Left coronary artery demonstrates irregularities 


throughout. The distal circumflex in 1 view after 2 major marginal 


branches may have as much as a 50% focal stenosis, but had normal ERVIN


3 flow.


 


CONCLUSIONS:


1.  The patient is known to have severe aortic stenosis.


2.  Total occlusion of a nondominant right coronary artery that is 


collateralized and a 50% stenosis of the distal circumflex.  This 


could be managed medically if TAVR would be better option for him.


 


RECOMMENDATIONS:


Consider TAVR versus AVR.  We will consult Dr. Baxter.


Brief History - From Admission


77 years old male with history of diabetes mellitus hypertension hyperlipidemia 

and aortic stenosis and gout presented to the ED with history of 3 day 

worsening short of breath  on rest but get even worsened with exertion, patient 

was referred from his PCP office when he went today to check about the short of 

breath.  Patient is on O2 nasal cannula he has also history for coronary artery 

disease he has a heart cath he was not sure if he does have a stent patient 

also had a colonoscopy about 3 weeks ago here in the hospital for GI bleed.  

Patient denied chest pain or other symptoms


CBC/BMP:  


4/6/18 0604                                                                    

            4/6/18 0544





Significant Findings





Laboratory Tests








Test


  4/4/18


05:01 4/5/18


05:29 4/6/18


05:44 4/6/18


06:04


 


Red Blood Count


  3.86 MIL/MM3


(4.50-5.90) 3.89 MIL/MM3


(4.50-5.90) 


  3.86 MIL/MM3


(4.50-5.90)


 


Hemoglobin


  10.5 GM/DL


(13.0-17.0) 10.6 GM/DL


(13.0-17.0) 


  10.6 GM/DL


(13.0-17.0)


 


Hematocrit


  32.4 %


(39.0-51.0) 32.5 %


(39.0-51.0) 


  32.8 %


(39.0-51.0)


 


Red Cell Distribution Width


  23.3 %


(11.6-17.2) 22.8 %


(11.6-17.2) 


  23.0 %


(11.6-17.2)


 


Blood Urea Nitrogen


  23 MG/DL


(7-18) 24 MG/DL


(7-18) 27 MG/DL


(7-18) 


 


 


Creatinine


  1.54 MG/DL


(0.60-1.30) 1.60 MG/DL


(0.60-1.30) 1.57 MG/DL


(0.60-1.30) 


 


 


Potassium Level


  3.4 MEQ/L


(3.5-5.1) 


  


  


 


 


Estimat Glomerular Filtration


Rate 44 ML/MIN


(>89) 42 ML/MIN


(>89) 43 ML/MIN


(>89) 


 


 


Neutrophils (%) (Auto)


  


  77.0 %


(16.0-70.0) 


  73.5 %


(16.0-70.0)


 


Monocytes (%) (Auto)


  


  8.2 %


(0.0-8.0) 


  8.6 %


(0.0-8.0)


 


Lymphocytes # (Auto)


  


  0.9 TH/MM3


(1.0-4.8) 


  


 


 


Random Glucose


  


  


  113 MG/DL


() 


 


 


Magnesium Level


  


  


  2.9 MG/DL


(1.5-2.5) 


 








Imaging





Last Impressions








Chest X-Ray 3/30/18 1503 Signed





Impressions: 





 Service Date/Time:  Friday, March 30, 2018 15:32 - CONCLUSION:  1. Right 

basilar 





 consolidation with possible small effusion. 2. Mild atelectatic changes of the 





 left hemidiaphragm. 3. Slight interstitial prominence which could represent 

some 





 degree of vascular congestion or volume overload. Heart size is borderline 





 prominent.     Gal Mayfield MD 








PE at Discharge


GENERAL: Awake alert and oriented 3 talkative and cooperative


SKIN: Warm and dry.


HEAD: Atraumatic. Normocephalic. 


EYES: Pupils equal and round. No scleral icterus. No injection or drainage.  

Extraocular muscles intact


ENT: No nasal bleeding or discharge.  Mucous membranes pink and moist.  Tongue 

is midline


NECK: Trachea midline. No JVD.  Supple


CARDIOVASCULAR: Regular rate and rhythm.  S1-S2 no S3 or S4 3/6 systolic 

ejection murmur


RESPIRATORY: No accessory muscle use. Clear to auscultation. Breath sounds 

equal bilaterally. 


GASTROINTESTINAL: Abdomen soft, non-tender, nondistended. Hepatic and splenic 

margins not palpable. 


MUSCULOSKELETAL: Extremities without clubbing, cyanosis, or edema. No obvious 

deformities. 


NEUROLOGICAL: Awake and alert. No obvious cranial nerve deficits.  Motor 

grossly within normal limits. Five out of 5 muscle strength in the arms and 

legs.  Normal speech.


PSYCHIATRIC: INAppropriate mood and affect; insight and judgment ABnormal.


Hospital Course


77 years old male with history of diabetes mellitus hypertension hyperlipidemia 

and aortic stenosis and gout presented to the ED with history of 3 day 

worsening short of breath  on rest but get even worsened with exertion, patient 

was referred from his PCP office when he went today to check about the short of 

breath.  Patient is on O2 nasal cannula he has also history for coronary artery 

disease he has a heart cath he was not sure if he does have a stent patient 

also had a colonoscopy about 3 weeks ago here in the hospital for GI bleed.  

Patient denied chest pain or other symptoms











Patient reports he is feeling ok. No new issues. CT surgery consulted for 

second opinion.





4-4 patient seen by cardiology meds adjusted started back on Plavix will be 

monitored in the hospital for the next few days before being cleared for 

discharge to make sure he can tolerate the Plavix with the future need for TAVR


Patient is refusing insulin


We will need his teeth taken care of before having the TAVR


Monitor in the hospital


Continue physical therapy and Occupational Therapy


Discussed with patient and RN and case management


We will replace his potassium





4-5 STARTED ON PLAVIX YESTERDAY


NO ACTIVE BLEEDING


GI STABLE


WILL CHECK AM LABS


IF STABLE DC ON 4-6 


DW RN AND PT AND CM





4-6 NO BLEEDING


WANTS TO GO HOME


DW RN AND PATIENT


NO BLEEDING


DC TO HOME TODAY


FOLLOW UP AS OUT PT


DW RN AND CM AND PT


Pt Condition on Discharge:  Good


Discharge Disposition:  Disch w/ Home Health Serv


Discharge Time:  > 30 minutes


Discharge Instructions


DIET: Follow Instructions for:  Heart Healthy Diet, Diabetic Diet, Renal 

Failure Diet


Speech Therapy-Diet Recommends:  Regular


Activities you can perform:  Regular-No Restrictions


Follow up Referrals:  


Cardiology - 10 Days with Jamey East MD


Cardiology, Interventional - 10 Days with Lázaro Salazar MD


Dental


Dental with Nir Boogie DMD


Gastroenterology - 2 Weeks with Chip Haq MD


PCP Follow-up - 2-3 Days


SNF/[facility]/


Vascular Surgery - 10 Days





New Medications:  


Aspirin DR (Aspirin DR) 81 Mg Tabdr


81 MG PO DAILY for Blood Clot Prevention, #90 TAB





Carvedilol (Coreg) 3.125 Mg Tab


3.125 MG PO Q12HR for Blood Pressure Management, #180 TAB





Clopidogrel (Plavix) 75 Mg Tab


75 MG PO DAILY for Blood Clot Prevention, #30 TAB





Furosemide (Furosemide) 40 Mg Tab


40 MG PO DAILY for Blood Pressure Management, #90 TAB





Hydrocodone/Acetaminophen (Hydrocodone-Acetamin 7.5-325) 7.5 Mg-325 Mg Tablet


1 TAB PO Q4H PRN for PAIN SCALE 6 TO 10, #15 TAB





Naproxen (Naproxen) 250 Mg Tab


250 MG PO Q12H PRN for gout pain, #60 TAB


TAKE WITH FOOD


Pantoprazole (Pantoprazole) 40 Mg Tab


40 MG PO DAILY for Heartburn Management, #90 TAB





Potassium Chloride Microencaps (Potassium Chloride Microencaps) 20 Meq Tab


20 MEQ PO DAILY for Nutritional Supplement, #90 TAB





Sennosides (Senna-Lax) 8.6 Mg Tab


17.2 MG PO Q12H PRN for Moderate constipation, #120 TAB





 


Continued Medications:  


Atorvastatin (Atorvastatin) 40 Mg Tab


40 MG PO DAILY for Cholesterol Management, #90 TAB 3 Refills (This prescription 

has been renewed)





Cyanocobalamin (Vitamin B-12) 1,000 Mcg Tab


1000 MCG PO DAILY for Nutritional Supplement, #1 BOTTLE 0 Refills (This 

prescription has been renewed)





Ferrous Sulfate (Ferrous Sulfate) 325 Mg (65 Mg Iron) Tablet


325 MG PO DAILY for Nutritional Supplement, #30 TAB 0 Refills (This 

prescription has been renewed)





Folic Acid (Folic Acid) 0.4 Mg Tab


400 MCG PO DAILY for Nutritional Supplement, #90 TAB 0 Refills (This 

prescription has been renewed)





Lisinopril (Lisinopril) 5 Mg Tab


5 MG PO DAILY for Blood Pressure Management, #30 TAB 0 Refills (This 

prescription has been renewed)





 


Discontinued Medications:  


Carvedilol (Carvedilol) 6.25 Mg Tab


6.25 MG PO BID, #60 TAB 0 Refills





Metformin (Metformin) 500 Mg Tab


500 MG PO DAILY for Blood Sugar Management, #30 TAB 0 Refills


With a meal


Torsemide (Torsemide) 10 Mg Tab


10 MG PO BID for Heart, #60 TAB 3 Refills

















Brad Pinto DO Apr 6, 2018 12:13

## 2018-04-06 NOTE — HHI.FF
Face to Face Verification


Diagnosis:  


(1) poor dentation


(2) Severe anemia


(3) JOSIE (acute kidney injury)


(4) Ischemic cardiomyopathy


(5) CAD (coronary artery disease)


(6) CHF (congestive heart failure)


(7) Diabetes mellitus


(8) Aortic stenosis


Physical Therapy


Order:  Evaluate and Treat, Improve ambulation, Strength and gait training





Occupational Therapy


Order:  Evaluate and Treat, Gross motor coordination, Fine motor coordination





Home Health Nursing








Order: Medical education





 Signs/symptoms of disease process





 CHF education





 Wound care and dressing changes





 Nursing assessment with vital signs











Home Health Aide


Order: To Assist In:  Bathing and personal care, Household chores and meal prep











I have seen patient Tang Cho on 4/6/18. My clinical findings 

support the need for the requested home health care services because:








 Med compliance is questionable














I certify that my clinical findings support that this patient is homebound 

because:








 Post-op weakness

















Brad Pinto DO Apr 6, 2018 12:14

## 2018-04-06 NOTE — PD.CARD.PN
Subjective


Subjective Remarks


feeling well. denies blood per rectum but hasn't moved bowels in a few days. No 

chest pain or sob, no N/V.  Will need CTA today for pre-TAVR work-up


 (Gege Thompson)





Objective


Medications





Current Medications








 Medications


  (Trade)  Dose


 Ordered  Sig/Ramu


 Route  Start Time


 Stop Time Status Last Admin


 


  (Heparin Inj)  5,000 units  Q8H


 SQ  3/30/18 17:30


   Future Hold 4/1/18 01:36


 


 


  (Norco  5-325 Mg)  1 tab  Q4H  PRN


 PO  3/30/18 17:30


     


 


 


  (Norco  7.5-325


 Mg)  1 tab  Q4H  PRN


 PO  3/30/18 17:30


     


 


 


  (Morphine Inj)  4 mg  Q3H  PRN


 IV PUSH  3/30/18 17:30


     


 


 


  (Narcan Inj)  0.4 mg  UNSCH  PRN


 IV PUSH  3/30/18 17:30


     


 


 


  (Arielle-Colace)  1 tab  BID


 PO  3/30/18 21:00


    4/5/18 22:50


 


 


  (Milk Of


 Magnesia Liq)  30 ml  Q12H  PRN


 PO  3/30/18 17:30


    4/5/18 15:24


 


 


  (Senokot)  17.2 mg  Q12H  PRN


 PO  3/30/18 17:30


     


 


 


  (Lipitor)  40 mg  DAILY


 PO  3/31/18 09:00


    4/5/18 08:56


 


 


  (Vitamin B12)  1,000 mcg  DAILY


 PO  3/31/18 09:00


    4/5/18 08:57


 


 


  (Ferrous Sulfate)  325 mg  DAILY


 PO  3/31/18 09:00


    4/5/18 08:56


 


 


  (Coreg)  3.125 mg  Q12HR


 PO  3/31/18 09:00


    4/5/18 22:50


 


 


  (D50w (Vial) Inj)  50 ml  UNSCH  PRN


 IV PUSH  3/31/18 14:00


     


 


 


  (Glucagon Inj)  1 mg  UNSCH  PRN


 OTHER  3/31/18 14:00


     


 


 


  (NovoLOG


 SUPPLEMENTAL


 SCALE)  1  ACHS SLIDING  SCALE


 SQ  3/31/18 17:00


    4/4/18 20:30


 


 


  (NS Flush)  2 ml  BID


 IV FLUSH  4/1/18 21:00


    4/5/18 22:51


 


 


  (NS Flush)  2 ml  UNSCH  PRN


 IV FLUSH  4/1/18 10:45


     


 


 


  (Zofran Inj)  4 mg  Q4H  PRN


 IV PUSH  4/1/18 10:45


     


 


 


  (Ecotrin Ec)  81 mg  DAILY


 PO  4/2/18 09:00


    4/5/18 08:56


 


 


  (Lactulose Liq)  30 ml  DAILY


 PO  4/2/18 15:30


    4/5/18 08:55


 


 


  (Lasix)  40 mg  DAILY


 PO  4/4/18 09:00


    4/5/18 08:56


 


 


  (Protonix)  40 mg  DAILY


 PO  4/4/18 09:00


    4/5/18 08:56


 


 


  (Plavix)  75 mg  DAILY


 PO  4/5/18 09:00


    4/5/18 08:57


 


 


  (KCl)  20 meq  DAILY


 PO  4/5/18 09:00


    4/5/18 08:57


 


 


  (Naprosyn)  250 mg  Q12H  PRN


 PO  4/5/18 15:15


    4/5/18 17:29


 








Vital Signs / I&O





Vital Signs








  Date Time  Temp Pulse Resp B/P (MAP) Pulse Ox O2 Delivery O2 Flow Rate FiO2


 


4/6/18 08:00 97.8 66 18 125/67 (86) 99   


 


4/6/18 05:00  66      


 


4/6/18 04:00  63      


 


4/6/18 04:00 98.1 71 20 123/70 (87) 97   


 


4/6/18 03:00  64      


 


4/6/18 02:00  64      


 


4/6/18 01:00  66      


 


4/6/18 00:00  69      


 


4/6/18 00:00 97.4 67 20 107/50 (69) 98   


 


4/5/18 23:00  70      


 


4/5/18 22:00  68      


 


4/5/18 21:00  72      


 


4/5/18 20:00 98.1 70 20 124/75 (91) 98   


 


4/5/18 20:00  67      


 


4/5/18 19:00  72      


 


4/5/18 18:00  70      


 


4/5/18 17:00  68      


 


4/5/18 16:00  70      


 


4/5/18 15:15  70 20 115/59 (77) 98   


 


4/5/18 15:00  68      


 


4/5/18 14:00  66      


 


4/5/18 13:00  68      


 


4/5/18 12:00  70      


 


4/5/18 12:00 98.7 70 20 123/71 (88) 99   


 


4/5/18 11:00  68      


 


4/5/18 10:00  70      


 


4/5/18 09:04 98.5 70 18 127/67 (87) 97   


 


4/5/18 09:00  66      














I/O      


 


 4/5/18 4/5/18 4/5/18 4/6/18 4/6/18 4/6/18





 07:00 15:00 23:00 07:00 15:00 23:00


 


Intake Total 600 ml  820 ml 240 ml  


 


Output Total 650 ml  675 ml 400 ml  


 


Balance -50 ml  145 ml -160 ml  


 


      


 


Intake Oral 600 ml  820 ml 240 ml  


 


Output Urine Total 650 ml  675 ml 400 ml  








Physical Exam


GENERAL: 


SKIN: Warm and dry.


HEAD: Atraumatic. Normocephalic. 


EYES: Pupils equal and round. ge. 


ENT: No nasal bleeding or discharge. 


NECK: Trachea midline. No JVD. 


CARDIOVASCULAR: Regular rate and rhythm.  systolic murmur


RESPIRATORY: No accessory muscle use. Clear to auscultation. Breath sounds 

equal bilaterally. 


GASTROINTESTINAL: Abdomen soft, non-tender, nondistended. 


MUSCULOSKELETAL: Extremities without clubbing, cyanosis, or edema. No obvious 

deformities. 


NEUROLOGICAL: Awake and alert. No obvious cranial nerve deficits.  .  Normal 

speech.


PSYCHIATRIC: Appropriate mood and affect; insight and judgment normal.


Laboratory





Laboratory Tests








Test


  4/6/18


05:44 4/6/18


06:04


 


Blood Urea Nitrogen 27 MG/DL  


 


Creatinine 1.57 MG/DL  


 


Random Glucose 113 MG/DL  


 


Total Protein 6.9 GM/DL  


 


Albumin 3.7 GM/DL  


 


Calcium Level 8.8 MG/DL  


 


Phosphorus Level 2.8 MG/DL  


 


Magnesium Level 2.9 MG/DL  


 


Alkaline Phosphatase 93 U/L  


 


Aspartate Amino Transf


(AST/SGOT) 16 U/L 


  


 


 


Alanine Aminotransferase


(ALT/SGPT) 20 U/L 


  


 


 


Total Bilirubin 0.5 MG/DL  


 


Sodium Level 139 MEQ/L  


 


Potassium Level 4.4 MEQ/L  


 


Chloride Level 103 MEQ/L  


 


Carbon Dioxide Level 29.7 MEQ/L  


 


Anion Gap 6 MEQ/L  


 


Estimat Glomerular Filtration


Rate 43 ML/MIN 


  


 


 


White Blood Count  7.6 TH/MM3 


 


Red Blood Count  3.86 MIL/MM3 


 


Hemoglobin  10.6 GM/DL 


 


Hematocrit  32.8 % 


 


Mean Corpuscular Volume  84.8 FL 


 


Mean Corpuscular Hemoglobin  27.4 PG 


 


Mean Corpuscular Hemoglobin


Concent 


  32.3 % 


 


 


Red Cell Distribution Width  23.0 % 


 


Platelet Count  216 TH/MM3 


 


Mean Platelet Volume  9.4 FL 


 


Neutrophils (%) (Auto)  73.5 % 


 


Lymphocytes (%) (Auto)  13.6 % 


 


Monocytes (%) (Auto)  8.6 % 


 


Eosinophils (%) (Auto)  3.4 % 


 


Basophils (%) (Auto)  0.9 % 


 


Neutrophils # (Auto)  5.6 TH/MM3 


 


Lymphocytes # (Auto)  1.0 TH/MM3 


 


Monocytes # (Auto)  0.6 TH/MM3 


 


Eosinophils # (Auto)  0.3 TH/MM3 


 


Basophils # (Auto)  0.1 TH/MM3 


 


CBC Comment  DIFF FINAL 


 


Differential Comment   








 (Gege Thompson)





Assessment and Plan


Problem List:  


(1) Aortic stenosis


ICD Codes:  I35.0 - Nonrheumatic aortic (valve) stenosis


Status:  Chronic


(2) CAD (coronary artery disease)


ICD Codes:  I25.10 - Atherosclerotic heart disease of native coronary artery 

without angina pectoris


Status:  Chronic


(3) Diabetes mellitus


ICD Codes:  E11.9 - Type 2 diabetes mellitus without complications


(4) CHF (congestive heart failure)


ICD Codes:  I50.9 - Heart failure, unspecified


Status:  Acute


(5) Ischemic cardiomyopathy


ICD Codes:  I25.5 - Ischemic cardiomyopathy


(6) JOSIE (acute kidney injury)


ICD Codes:  N17.9 - Acute kidney failure, unspecified


(7) Severe anemia


ICD Codes:  D64.9 - Anemia, unspecified


Status:  Acute


Assessment and Plan


76 yo M with CAD, diabetes, CHF, anemia and cardiomyopathy admitted for 

progressive dyspnea and found to have severe aortic stenosis.





severe aortic stenosis- RICHIE 0.4mm, mean gradient 53mmHg


plan for TAVR, not during this admission.


will need CTA today as part of pre-TAVR workup. due to creatinine will require 

pre-hydration.


will need BMP next week to follow creatinine





anemia- improving. tolerating Plavix.


GI following, plans to have outpatient capsule study


 (Gege Thompson)


Assessment and Plan


---------------


agree with above


gentle pre-hydration


CTA today pre TAVR


then DC planning


no bowel movement for "days" per patient.  No bleeding on plavix.


will need close FU in [facility].


TAVR coordinator to arrange scheduling


 (Lázaro Salazar MD)





Problem Qualifiers





(1) CHF (congestive heart failure):  


Qualified Codes:  I50.9 - Heart failure, unspecified








Gege Thompson Apr 6, 2018 09:01


Lázaro Salazar MD Apr 6, 2018 09:04

## 2018-04-22 ENCOUNTER — HOSPITAL ENCOUNTER (INPATIENT)
Dept: HOSPITAL 17 - NEPE | Age: 78
LOS: 9 days | Discharge: NURSING, REHAB OR LONG-TERM CARE | DRG: 377 | End: 2018-05-01
Attending: HOSPITALIST | Admitting: HOSPITALIST
Payer: COMMERCIAL

## 2018-04-22 VITALS
OXYGEN SATURATION: 100 % | TEMPERATURE: 97.9 F | RESPIRATION RATE: 25 BRPM | HEART RATE: 115 BPM | DIASTOLIC BLOOD PRESSURE: 59 MMHG | SYSTOLIC BLOOD PRESSURE: 111 MMHG

## 2018-04-22 VITALS
HEART RATE: 117 BPM | TEMPERATURE: 97.8 F | DIASTOLIC BLOOD PRESSURE: 55 MMHG | RESPIRATION RATE: 20 BRPM | SYSTOLIC BLOOD PRESSURE: 99 MMHG | OXYGEN SATURATION: 99 %

## 2018-04-22 VITALS — OXYGEN SATURATION: 99 %

## 2018-04-22 VITALS
OXYGEN SATURATION: 100 % | RESPIRATION RATE: 19 BRPM | SYSTOLIC BLOOD PRESSURE: 108 MMHG | TEMPERATURE: 97.7 F | DIASTOLIC BLOOD PRESSURE: 65 MMHG | HEART RATE: 113 BPM

## 2018-04-22 VITALS
HEART RATE: 115 BPM | TEMPERATURE: 97.9 F | DIASTOLIC BLOOD PRESSURE: 59 MMHG | RESPIRATION RATE: 22 BRPM | SYSTOLIC BLOOD PRESSURE: 111 MMHG | OXYGEN SATURATION: 100 %

## 2018-04-22 VITALS
DIASTOLIC BLOOD PRESSURE: 59 MMHG | OXYGEN SATURATION: 100 % | HEART RATE: 105 BPM | TEMPERATURE: 98.5 F | RESPIRATION RATE: 21 BRPM | SYSTOLIC BLOOD PRESSURE: 103 MMHG

## 2018-04-22 VITALS
HEART RATE: 110 BPM | DIASTOLIC BLOOD PRESSURE: 62 MMHG | SYSTOLIC BLOOD PRESSURE: 107 MMHG | OXYGEN SATURATION: 99 % | RESPIRATION RATE: 18 BRPM

## 2018-04-22 VITALS
HEART RATE: 110 BPM | RESPIRATION RATE: 18 BRPM | SYSTOLIC BLOOD PRESSURE: 118 MMHG | OXYGEN SATURATION: 98 % | DIASTOLIC BLOOD PRESSURE: 56 MMHG

## 2018-04-22 VITALS
HEART RATE: 113 BPM | DIASTOLIC BLOOD PRESSURE: 60 MMHG | OXYGEN SATURATION: 100 % | SYSTOLIC BLOOD PRESSURE: 106 MMHG | RESPIRATION RATE: 27 BRPM

## 2018-04-22 VITALS
HEART RATE: 114 BPM | OXYGEN SATURATION: 100 % | SYSTOLIC BLOOD PRESSURE: 107 MMHG | DIASTOLIC BLOOD PRESSURE: 61 MMHG | RESPIRATION RATE: 21 BRPM

## 2018-04-22 VITALS — WEIGHT: 196.21 LBS | HEIGHT: 67 IN | BODY MASS INDEX: 30.8 KG/M2

## 2018-04-22 DIAGNOSIS — I35.0: ICD-10-CM

## 2018-04-22 DIAGNOSIS — M10.9: ICD-10-CM

## 2018-04-22 DIAGNOSIS — N18.3: ICD-10-CM

## 2018-04-22 DIAGNOSIS — I25.5: ICD-10-CM

## 2018-04-22 DIAGNOSIS — I95.9: ICD-10-CM

## 2018-04-22 DIAGNOSIS — I50.9: ICD-10-CM

## 2018-04-22 DIAGNOSIS — L30.8: ICD-10-CM

## 2018-04-22 DIAGNOSIS — Z83.3: ICD-10-CM

## 2018-04-22 DIAGNOSIS — E78.5: ICD-10-CM

## 2018-04-22 DIAGNOSIS — I25.10: ICD-10-CM

## 2018-04-22 DIAGNOSIS — I25.2: ICD-10-CM

## 2018-04-22 DIAGNOSIS — E11.22: ICD-10-CM

## 2018-04-22 DIAGNOSIS — Z66: ICD-10-CM

## 2018-04-22 DIAGNOSIS — K92.1: Primary | ICD-10-CM

## 2018-04-22 DIAGNOSIS — I21.4: ICD-10-CM

## 2018-04-22 DIAGNOSIS — K59.00: ICD-10-CM

## 2018-04-22 DIAGNOSIS — R00.0: ICD-10-CM

## 2018-04-22 DIAGNOSIS — N17.9: ICD-10-CM

## 2018-04-22 DIAGNOSIS — D63.1: ICD-10-CM

## 2018-04-22 DIAGNOSIS — R63.4: ICD-10-CM

## 2018-04-22 DIAGNOSIS — I13.0: ICD-10-CM

## 2018-04-22 DIAGNOSIS — F41.9: ICD-10-CM

## 2018-04-22 LAB
ALBUMIN SERPL-MCNC: 3.8 GM/DL (ref 3.4–5)
ALP SERPL-CCNC: 72 U/L (ref 45–117)
ALT SERPL-CCNC: 30 U/L (ref 12–78)
AST SERPL-CCNC: 32 U/L (ref 15–37)
BASOPHILS # BLD AUTO: 0 TH/MM3 (ref 0–0.2)
BASOPHILS NFR BLD: 0.3 % (ref 0–2)
BILIRUB SERPL-MCNC: 0.6 MG/DL (ref 0.2–1)
BUN SERPL-MCNC: 36 MG/DL (ref 7–18)
CALCIUM SERPL-MCNC: 9.1 MG/DL (ref 8.5–10.1)
CHLORIDE SERPL-SCNC: 99 MEQ/L (ref 98–107)
CREAT SERPL-MCNC: 2.07 MG/DL (ref 0.6–1.3)
EOSINOPHIL # BLD: 0 TH/MM3 (ref 0–0.4)
EOSINOPHIL NFR BLD: 0 % (ref 0–4)
ERYTHROCYTE [DISTWIDTH] IN BLOOD BY AUTOMATED COUNT: 21.4 % (ref 11.6–17.2)
GFR SERPLBLD BASED ON 1.73 SQ M-ARVRAT: 31 ML/MIN (ref 89–?)
GLUCOSE SERPL-MCNC: 160 MG/DL (ref 74–106)
HCO3 BLD-SCNC: 15.3 MEQ/L (ref 21–32)
HCT VFR BLD CALC: 17.4 % (ref 39–51)
HGB BLD-MCNC: 5.5 GM/DL (ref 13–17)
INR PPP: 1.1 RATIO
LYMPHOCYTES # BLD AUTO: 0.4 TH/MM3 (ref 1–4.8)
LYMPHOCYTES NFR BLD AUTO: 3.1 % (ref 9–44)
MCH RBC QN AUTO: 27 PG (ref 27–34)
MCHC RBC AUTO-ENTMCNC: 31.5 % (ref 32–36)
MCV RBC AUTO: 85.8 FL (ref 80–100)
MONOCYTE #: 0.6 TH/MM3 (ref 0–0.9)
MONOCYTES NFR BLD: 5 % (ref 0–8)
NEUTROPHILS # BLD AUTO: 11.6 TH/MM3 (ref 1.8–7.7)
NEUTROPHILS NFR BLD AUTO: 91.6 % (ref 16–70)
PLATELET # BLD: 390 TH/MM3 (ref 150–450)
PMV BLD AUTO: 9.6 FL (ref 7–11)
PROT SERPL-MCNC: 6.8 GM/DL (ref 6.4–8.2)
PROTHROMBIN TIME: 11.4 SEC (ref 9.8–11.6)
RBC # BLD AUTO: 2.03 MIL/MM3 (ref 4.5–5.9)
SODIUM SERPL-SCNC: 131 MEQ/L (ref 136–145)
TROPONIN I SERPL-MCNC: 2.45 NG/ML (ref 0.02–0.05)
WBC # BLD AUTO: 12.6 TH/MM3 (ref 4–11)

## 2018-04-22 PROCEDURE — 80053 COMPREHEN METABOLIC PANEL: CPT

## 2018-04-22 PROCEDURE — 84484 ASSAY OF TROPONIN QUANT: CPT

## 2018-04-22 PROCEDURE — 93005 ELECTROCARDIOGRAM TRACING: CPT

## 2018-04-22 PROCEDURE — 82948 REAGENT STRIP/BLOOD GLUCOSE: CPT

## 2018-04-22 PROCEDURE — 80069 RENAL FUNCTION PANEL: CPT

## 2018-04-22 PROCEDURE — 94640 AIRWAY INHALATION TREATMENT: CPT

## 2018-04-22 PROCEDURE — 85014 HEMATOCRIT: CPT

## 2018-04-22 PROCEDURE — 78278 ACUTE GI BLOOD LOSS IMAGING: CPT

## 2018-04-22 PROCEDURE — 80048 BASIC METABOLIC PNL TOTAL CA: CPT

## 2018-04-22 PROCEDURE — C9113 INJ PANTOPRAZOLE SODIUM, VIA: HCPCS

## 2018-04-22 PROCEDURE — 74176 CT ABD & PELVIS W/O CONTRAST: CPT

## 2018-04-22 PROCEDURE — 86901 BLOOD TYPING SEROLOGIC RH(D): CPT

## 2018-04-22 PROCEDURE — 71046 X-RAY EXAM CHEST 2 VIEWS: CPT

## 2018-04-22 PROCEDURE — 36430 TRANSFUSION BLD/BLD COMPNT: CPT

## 2018-04-22 PROCEDURE — A9560 TC99M LABELED RBC: HCPCS

## 2018-04-22 PROCEDURE — 85025 COMPLETE CBC W/AUTO DIFF WBC: CPT

## 2018-04-22 PROCEDURE — 86900 BLOOD TYPING SEROLOGIC ABO: CPT

## 2018-04-22 PROCEDURE — 96365 THER/PROPH/DIAG IV INF INIT: CPT

## 2018-04-22 PROCEDURE — 74019 RADEX ABDOMEN 2 VIEWS: CPT

## 2018-04-22 PROCEDURE — 86850 RBC ANTIBODY SCREEN: CPT

## 2018-04-22 PROCEDURE — 83735 ASSAY OF MAGNESIUM: CPT

## 2018-04-22 PROCEDURE — 85018 HEMOGLOBIN: CPT

## 2018-04-22 PROCEDURE — 94664 DEMO&/EVAL PT USE INHALER: CPT

## 2018-04-22 PROCEDURE — 96375 TX/PRO/DX INJ NEW DRUG ADDON: CPT

## 2018-04-22 PROCEDURE — 82550 ASSAY OF CK (CPK): CPT

## 2018-04-22 PROCEDURE — 30233N1 TRANSFUSION OF NONAUTOLOGOUS RED BLOOD CELLS INTO PERIPHERAL VEIN, PERCUTANEOUS APPROACH: ICD-10-PCS | Performed by: FAMILY MEDICINE

## 2018-04-22 PROCEDURE — 82552 ASSAY OF CPK IN BLOOD: CPT

## 2018-04-22 PROCEDURE — 85610 PROTHROMBIN TIME: CPT

## 2018-04-22 PROCEDURE — 93306 TTE W/DOPPLER COMPLETE: CPT

## 2018-04-22 PROCEDURE — 84100 ASSAY OF PHOSPHORUS: CPT

## 2018-04-22 PROCEDURE — 86920 COMPATIBILITY TEST SPIN: CPT

## 2018-04-22 PROCEDURE — 83690 ASSAY OF LIPASE: CPT

## 2018-04-22 PROCEDURE — 85730 THROMBOPLASTIN TIME PARTIAL: CPT

## 2018-04-22 PROCEDURE — 87641 MR-STAPH DNA AMP PROBE: CPT

## 2018-04-22 PROCEDURE — P9016 RBC LEUKOCYTES REDUCED: HCPCS

## 2018-04-22 PROCEDURE — 71045 X-RAY EXAM CHEST 1 VIEW: CPT

## 2018-04-22 RX ADMIN — INSULIN ASPART SCH: 100 INJECTION, SOLUTION INTRAVENOUS; SUBCUTANEOUS at 21:00

## 2018-04-22 RX ADMIN — Medication SCH ML: at 21:56

## 2018-04-22 RX ADMIN — IPRATROPIUM BROMIDE AND ALBUTEROL SULFATE SCH AMPULE: .5; 3 SOLUTION RESPIRATORY (INHALATION) at 21:08

## 2018-04-22 NOTE — PD
Physical Exam


Narrative


Patient was seen by ED physician and signed out to me.





Data


Data


Last Documented VS





Vital Signs








  Date Time  Temp Pulse Resp B/P (MAP) Pulse Ox O2 Delivery O2 Flow Rate FiO2


 


4/22/18 18:00  110 18 118/56 (76) 98 Room Air  


 


4/22/18 17:08 97.8       








Orders





 Orders


Complete Blood Count With Diff (4/22/18 17:33)


Lipase (4/22/18 17:33)


Prothrombin Time / Inr (Pt) (4/22/18 17:33)


Act Partial Throm Time (Ptt) (4/22/18 17:33)


Urinalysis - C+S If Indicated (4/22/18 17:33)


Type And Screen (4/22/18 17:33)


Chest, Single Ap (4/22/18 17:33)


Ondansetron Inj (Zofran Inj) (4/22/18 17:45)


Sodium Chloride 0.9... W/Pantoprazole In (4/22/18 17:33)


Sodium Chloride 0.9... W/Pantoprazole In (4/22/18 17:33)


Troponin I (4/22/18 17:33)


Comprehensive Metabolic Panel (4/22/18 17:33)


Red Blood Cells (Rbc) (4/22/18 19:00)


Blood Product Administration (4/22/18 19:00)


Sodium Chlor 0.9% 250 Ml Inj (Ns 250 Ml (4/22/18 19:00)


Furosemide Inj (Lasix Inj) (4/22/18 19:00)


Ct Abd/Pel W/O Iv Contrast (4/22/18 )


Admit Order (Ed Use Only) (4/22/18 19:33)





Labs





Laboratory Tests








Test


  4/22/18


18:07


 


White Blood Count 12.6 TH/MM3 


 


Red Blood Count 2.03 MIL/MM3 


 


Hemoglobin 5.5 GM/DL 


 


Hematocrit 17.4 % 


 


Mean Corpuscular Volume 85.8 FL 


 


Mean Corpuscular Hemoglobin 27.0 PG 


 


Mean Corpuscular Hemoglobin


Concent 31.5 % 


 


 


Red Cell Distribution Width 21.4 % 


 


Platelet Count 390 TH/MM3 


 


Mean Platelet Volume 9.6 FL 


 


Neutrophils (%) (Auto) 91.6 % 


 


Lymphocytes (%) (Auto) 3.1 % 


 


Monocytes (%) (Auto) 5.0 % 


 


Eosinophils (%) (Auto) 0.0 % 


 


Basophils (%) (Auto) 0.3 % 


 


Neutrophils # (Auto) 11.6 TH/MM3 


 


Lymphocytes # (Auto) 0.4 TH/MM3 


 


Monocytes # (Auto) 0.6 TH/MM3 


 


Eosinophils # (Auto) 0.0 TH/MM3 


 


Basophils # (Auto) 0.0 TH/MM3 


 


CBC Comment DIFF FINAL 


 


Differential Comment  


 


Prothrombin Time 11.4 SEC 


 


Prothromb Time International


Ratio 1.1 RATIO 


 


 


Activated Partial


Thromboplast Time 22.9 SEC 


 


 


Blood Urea Nitrogen 36 MG/DL 


 


Creatinine 2.07 MG/DL 


 


Random Glucose 160 MG/DL 


 


Total Protein 6.8 GM/DL 


 


Albumin 3.8 GM/DL 


 


Calcium Level 9.1 MG/DL 


 


Alkaline Phosphatase 72 U/L 


 


Aspartate Amino Transf


(AST/SGOT) 32 U/L 


 


 


Alanine Aminotransferase


(ALT/SGPT) 30 U/L 


 


 


Total Bilirubin 0.6 MG/DL 


 


Sodium Level 131 MEQ/L 


 


Potassium Level 5.1 MEQ/L 


 


Chloride Level 99 MEQ/L 


 


Carbon Dioxide Level 15.3 MEQ/L 


 


Anion Gap 17 MEQ/L 


 


Estimat Glomerular Filtration


Rate 31 ML/MIN 


 


 


Troponin I 2.45 NG/ML 


 


Lipase 316 U/L 











[facility]


Supervised Visit with FAROOQ:  No


Interpretation(s)


1922 PM.  EKG shows sinus tachycardia rate 110.  T-wave inversion in lead I, II

, aVL, V4, V5 and V6.  Unchanged from previous EKG.


Last Impressions








Chest X-Ray 4/22/18 1733 Signed





Impressions: 





 Service Date/Time:  Sunday, April 22, 2018 17:50 - CONCLUSION:  Underinflation 





 with atelectasis at the lung bases. Otherwise, no acute cardiopulmonary 





 abnormality is identified.     Adi Niño MD 





1922 PM.  CBC WBC 12.6 with hemoglobin 5.5 hematocrit 17.4.  91 neutrophil.  

Sodium 131.  Bicarb 15.3.  Anion gap 17.  BUN 36.  Creatinine 2.07.  GFR 31.


Diagnosis





 Primary Impression:  


 GI bleed


 Qualified Codes:  K92.2 - Gastrointestinal hemorrhage, unspecified


 Additional Impressions:  


 Severe anemia


 NSTEMI (non-ST elevated myocardial infarction)


 Acute kidney injury superimposed on chronic kidney disease





Admitting Information


Admitting Physician Requests:  Admit











Carlos Plummer MD Apr 22, 2018 19:31

## 2018-04-22 NOTE — RADRPT
EXAM DATE/TIME:  04/22/2018 17:50 

 

[facility] COMPARISON:     

CHEST SINGLE AP, March 30, 2018, 15:32.

 

                     

INDICATIONS :     

Vomiting, chest pain.

                     

 

MEDICAL HISTORY :     

Hypertension.          

 

SURGICAL HISTORY :     

None.   

 

ENCOUNTER:     

Initial                                        

 

ACUITY:     

1 day      

 

PAIN SCORE:     

0/10

 

LOCATION:     

Bilateral  chest

 

FINDINGS:     

Underinflated AP view of the chest demonstrates a normal-sized cardiac silhouette. No effusion, conso
lidation, or pneumothorax is visualized. There is atelectasis at the lung bases. The bones and soft t
issues demonstrate no acute finding.

 

CONCLUSION:     

Underinflation with atelectasis at the lung bases. Otherwise, no acute cardiopulmonary abnormality is
 identified.

 

 

 

 Adi Niño MD on April 22, 2018 at 18:02           

Board Certified Radiologist.

 This report was verified electronically.

## 2018-04-22 NOTE — EKG
Date Performed: 04/22/2018       Time Performed: 17:22:20

 

PTAGE:      77 years

 

EKG:      SINUS TACHYCARDIA MODERATE INTRAVENTRICULAR CONDUCTION DELAY ST DEVIATION AND MODERATE T-WA
VE ABNORMALITY, CONSIDER LATERAL ISCHEMIA ABNORMAL ECG

 

PREVIOUS TRACING       : 03/30/2018 15.14 Since the previous tracing, no significant change noted

 

DOCTOR:   Paul Bo  Interpretating Date/Time  04/22/2018 23:51:28

## 2018-04-22 NOTE — RADRPT
EXAM DATE/TIME:  04/22/2018 20:13 

 

[facility] COMPARISON:     

No previous studies available for comparison.

 

 

INDICATIONS :     

Diffuse abdomen pain and nausea today.

                  

 

ORAL CONTRAST:      

No oral contrast ingested.

                  

 

RADIATION DOSE:     

9.79 CTDIvol (mGy) 

 

 

MEDICAL HISTORY :     

Hypertension. Congestive heart failure. Myocardial infarction.diabetes, aortic stenosis

 

SURGICAL HISTORY :      

None. 

 

ENCOUNTER:      

Initial

 

ACUITY:      

1 day

 

PAIN SCALE:      

7/10

 

LOCATION:       

Bilateral  abdomen

 

TECHNIQUE:     

Volumetric scanning of the abdomen and pelvis was performed.  Using automated exposure control and ad
justment of the mA and/or kV according to patient size, radiation dose was kept as low as reasonably 
achievable to obtain optimal diagnostic quality images.  DICOM format image data is available electro
nically for review and comparison.  

 

FINDINGS:     

There is respiratory motion artifact.

 

LOWER LUNGS:     

The visualized lower lungs demonstrate no acute finding.

 

LIVER:     

Homogeneous density without lesion.  There is no dilation of the biliary tree.  There are stones with
in the gallbladder.

 

SPLEEN:     

Normal size without lesion.

 

PANCREAS:     

Within normal limits. 

 

KIDNEYS:     

Normal in size and shape.  There is no mass, stone, or hydronephrosis. There is a low-density lesion 
in the lower pole the right kidney measuring 2.3 cm and at the lower pole the left kidney measuring 1
.1 cm. Both have density measurements consistent with cysts.

 

ADRENAL GLANDS:     

Within normal limits.

 

VASCULAR:     

There is no aortic aneurysm. There is severe atherosclerotic disease of the abdominal aorta. Coronary
 artery calcification is present and there is low density of the cardiac blood pool suggesting anemia
.

 

BOWEL/MESENTERY:     

The stomach, small bowel, and colon demonstrate no acute abnormality.  There is no free intraperitone
al air or fluid. Appendix is normal.

 

ABDOMINAL WALL:     

Within normal limits.

 

RETROPERITONEUM:     

There is no lymphadenopathy.

 

BLADDER:     

No wall thickening or mass.

 

REPRODUCTIVE:     

Prostate gland is enlarged.

 

INGUINAL:     

There is no lymphadenopathy or hernia.

 

MUSCULOSKELETAL:     

There are degenerative changes of the lumbar spine.

 

CONCLUSION:     

1. No acute finding is identified to explain the clinical symptoms.

2. Nonacute findings include cholelithiasis, severe atherosclerotic disease, and prostatomegaly.

 

 

 

 Adi Niño MD on April 22, 2018 at 20:25           

Board Certified Radiologist.

 This report was verified electronically.

## 2018-04-22 NOTE — HHI.HP
__________________________________________________





HPI


Service


[facility]


Primary Care Physician


Non-Staff


Admission Diagnosis





Severe anemia.  GI bleed.  NSTEMI.  Acute on chronic kidney disease


Diagnoses:  


Travel History


International Travel<30 Days:  No


Contact w/Intl Traveler <30 Da:  No


Traveled to Known Affected Are:  No


History of Present Illness


77-year-old male with a past medical history significant for coronary artery 

disease, diabetes mellitus, hypertension, and CHF (last echo on 18 with an 

EF of 30-35%) presents to the emergency department for the evaluation of 

anorexia with accompanying continuous nonbloody nonbilious emesis.  The patient'

s symptoms started today and he has been unable to eat or drink anything 

including water and ginger ale.  He has had constant intermittent emesis that 

is mostly clear.  He denies any chest pain or shortness of breath.  Endorses 

generalized abdominal pain.  No diarrhea.  No lateralizing signs/symptoms.  No 

fevers/chills.  Patient is severely anemic with an elevated troponin.





Review of Systems


Except as stated in HPI:  all other systems reviewed are Neg





Past Family Social History


Past Medical History


Diabetes mellitus


Hypertension


Hyperlipidemia


Coronary artery disease


Past Surgical History


Cardiac catheterization


EGD/colonoscopy


Reported Medications





Reported Meds & Active Scripts


Active


Pantoprazole (Pantoprazole Sodium) 40 Mg Tab 40 Mg PO DAILY


Potassium Chloride Microencaps 20 Meq Tab 20 Meq PO DAILY


Furosemide 40 Mg Tab 40 Mg PO DAILY


Naproxen 250 Mg Tab 250 Mg PO Q12H PRN


     TAKE WITH FOOD


Aspirin DR (Aspirin) 81 Mg Tabdr 81 Mg PO DAILY


Coreg (Carvedilol) 3.125 Mg Tab 3.125 Mg PO Q12HR


Plavix (Clopidogrel Bisulfate) 75 Mg Tab 75 Mg PO DAILY


Vitamin B-12 (Cyanocobalamin) 1,000 Mcg Tab 1,000 Mcg PO DAILY


Lisinopril 5 Mg Tab 5 Mg PO DAILY


Folic Acid 0.4 Mg Tab 400 Mcg PO DAILY


Ferrous Sulfate 325 Mg (65 Mg Iron) Tablet 325 Mg PO DAILY


Allergies:  


Coded Allergies:  


     No Known Allergies (Verified  Allergy, Unknown, 18)


Family History


Mother with diabetes mellitus


Social History


Denies alcohol, tobacco and illicit drugs.





Physical Exam


Vital Signs





Vital Signs








  Date Time  Temp Pulse Resp B/P (MAP) Pulse Ox O2 Delivery O2 Flow Rate FiO2


 


18 20:11  110 18 107/62 (77) 99 Nasal Cannula 2.00 


 


18 18:00  110 18 118/56 (76) 98 Room Air  


 


18 17:08 97.8 117 20 99/55 (70) 99   








Physical Exam


GENERAL:  male sitting up in bed, retching


SKIN: No rashes, ecchymoses or lesions. Cool and dry.


HEAD: Atraumatic. Normocephalic. No temporal or scalp tenderness.


EYES: Pupils equal round and reactive. Extraocular motions intact. No scleral 

icterus. No injection or drainage. 


ENT: Nose without bleeding, purulent drainage or septal hematoma. Throat 

without erythema, tonsillar hypertrophy or exudate. Uvula midline. Airway 

patent.


NECK: Trachea midline. No JVD or lymphadenopathy. Supple, nontender, no 

meningeal signs.


CARDIOVASCULAR: Tachycardic.  Regular rhythm.  3/6 murmur.


RESPIRATORY: Clear to auscultation. Breath sounds equal bilaterally. No wheezes

, rales, or rhonchi.  


GASTROINTESTINAL: Abdomen soft, non-tender, nondistended. No hepato-splenomegaly

, or palpable masses. No guarding.


MUSCULOSKELETAL: Extremities without clubbing, cyanosis, or edema. No joint 

tenderness, effusion, or edema noted. No calf tenderness. 


NEUROLOGICAL: Awake and alert. Cranial nerves II through XII intact.  Motor and 

sensory grossly within normal limits. Normal speech.


Laboratory





Laboratory Tests








Test


  18


18:07


 


White Blood Count 12.6 


 


Red Blood Count 2.03 


 


Hemoglobin 5.5 


 


Hematocrit 17.4 


 


Mean Corpuscular Volume 85.8 


 


Mean Corpuscular Hemoglobin 27.0 


 


Mean Corpuscular Hemoglobin


Concent 31.5 


 


 


Red Cell Distribution Width 21.4 


 


Platelet Count 390 


 


Mean Platelet Volume 9.6 


 


Neutrophils (%) (Auto) 91.6 


 


Lymphocytes (%) (Auto) 3.1 


 


Monocytes (%) (Auto) 5.0 


 


Eosinophils (%) (Auto) 0.0 


 


Basophils (%) (Auto) 0.3 


 


Neutrophils # (Auto) 11.6 


 


Lymphocytes # (Auto) 0.4 


 


Monocytes # (Auto) 0.6 


 


Eosinophils # (Auto) 0.0 


 


Basophils # (Auto) 0.0 


 


CBC Comment DIFF FINAL 


 


Differential Comment  


 


Prothrombin Time 11.4 


 


Prothromb Time International


Ratio 1.1 


 


 


Activated Partial


Thromboplast Time 22.9 


 


 


Blood Urea Nitrogen 36 


 


Creatinine 2.07 


 


Random Glucose 160 


 


Total Protein 6.8 


 


Albumin 3.8 


 


Calcium Level 9.1 


 


Alkaline Phosphatase 72 


 


Aspartate Amino Transf


(AST/SGOT) 32 


 


 


Alanine Aminotransferase


(ALT/SGPT) 30 


 


 


Total Bilirubin 0.6 


 


Sodium Level 131 


 


Potassium Level 5.1 


 


Chloride Level 99 


 


Carbon Dioxide Level 15.3 


 


Anion Gap 17 


 


Estimat Glomerular Filtration


Rate 31 


 


 


Troponin I 2.45 


 


Lipase 316 








Result Diagram:  


18








Caprini VTE Risk Assessment


Caprini VTE Risk Assessment:  Mod/High Risk (score >= 2)


Caprini Risk Assessment Model











 Point Value = 1          Point Value = 2  Point Value = 3  Point Value = 5


 


Age 41-60


Minor surgery


BMI > 25 kg/m2


Swollen legs


Varicose veins


Pregnancy or postpartum


History of unexplained or recurrent


   spontaneous 


Oral contraceptives or hormone


   replacement


Sepsis (< 1 month)


Serious lung disease, including


   pneumonia (< 1 month)


Abnormal pulmonary function


Acute myocardial infarction


Congestive heart failure (< 1 month)


History of inflammatory bowel disease


Medical patient at bed rest Age 61-74


Arthroscopic surgery


Major open surgery (> 45 min)


Laparoscopic surgery (> 45 min)


Malignancy


Confined to bed (> 72 hours)


Immobilizing plaster cast


Central venous access Age >= 75


History of VTE


Family history of VTE


Factor V Leiden


Prothrombin 73972E


Lupus anticoagulant


Anticardiolipin antibodies


Elevated serum homocysteine


Heparin-induced thrombocytopenia


Other congenital or acquired


   thrombophilia Stroke (< 1 month)


Elective arthroplasty


Hip, pelvis, or leg fracture


Acute spinal cord injury (< 1 month)








Prophylaxis Regimen











   Total Risk


Factor Score Risk Level Prophylaxis Regimen


 


0-1      Low Early ambulation


 


2 Moderate Order ONE of the following:


*Sequential Compression Device (SCD)


*Heparin 5000 units SQ BID


 


3-4 Higher Order ONE of the following medications:


*Heparin 5000 units SQ TID


*Enoxaparin/Lovenox 40 mg SQ daily (WT < 150 kg, CrCl > 30 mL/min)


*Enoxaparin/Lovenox 30 mg SQ daily (WT < 150 kg, CrCl > 10-29 mL/min)


*Enoxaparin/Lovenox 30 mg SQ BID (WT < 150 kg, CrCl > 30 mL/min)


AND/OR


*Sequential Compression Device (SCD)


 


5 or more Highest Order ONE of the following medications:


*Heparin 5000 units SQ TID (Preferred with Epidurals)


*Enoxaparin/Lovenox 40 mg SQ daily (WT < 150 kg, CrCl > 30 mL/min)


*Enoxaparin/Lovenox 30 mg SQ daily (WT < 150 kg, CrCl > 10-29 mL/min)


*Enoxaparin/Lovenox 30 mg SQ BID (WT < 150 kg, CrCl > 30 mL/min)


AND


*Sequential Compression Device (SCD)











Assessment and Plan


Assessment and Plan


Assessment/plan:





1.  GI bleed/symptomatic anemia


Status post EGD/colonoscopy 18 with no active bleeding


Transfuse 3 units packed red blood cells


Serial H&H


Protonix drip


Gastroenterology consulted, appreciate recommendations


Patient scheduled to undergo capsule endoscopy tomorrow





2.  Atypical chest pain/NSTEMI/CAD


Patient status post cardiac catheterization on 18 which showed severe 

aortic stenosis with total occlusion of a nondominant right coronary artery and 

a 50% stenosis of the distal circumflex.  Optimal medical management at that 

time.


EKG significant for sinus tachycardia with T-wave inversion in I, aVL, II, V4-

V6.  No ST segment elevation/depressions, personally reviewed


Initial troponin 2.45


Serial troponins/EKGs


Patient not current candidate for heparin drip given active bleeding


Cardiology consulted, appreciate assistance


Holding aspirin/Plavix for active bleeding


Continue beta-blocker





3.  Hypertension/hyperlipidemia


Continue home medications





4.  Diabetes mellitus


Sliding-scale insulin


Monitor blood glucose





5.  Acute on chronic renal insufficiency


BUN/creatinine 36/2.07


Baseline 1.5


Gentle IV fluid hydration


Monitor renal function





FEN


NPO


Electrolytes: Monitor and replete as needed


NS at 60 cc/hr


Holding pharmacologic anticoagulation secondary to GI bleed





Physician Certification


2 Midnight Certification Type:  Admission for Inpatient Services


Order for Inpatient Services


The services are ordered in accordance with Medicare regulations or non-

Medicare payer requirements, as applicable.  In the case of services not 

specified as inpatient-only, they are appropriately provided as inpatient 

services in accordance with the 2-midnight benchmark.


Estimated LOS (days):  2


2 days is the estimated time the patient will need to remain in the hospital, 

assuming treatment plan goals are met and no additional complications.


Post-Hospital Plan:  Not yet determined











Rita Diaz MD 2018 20:43

## 2018-04-23 VITALS
DIASTOLIC BLOOD PRESSURE: 61 MMHG | TEMPERATURE: 98.5 F | SYSTOLIC BLOOD PRESSURE: 107 MMHG | RESPIRATION RATE: 23 BRPM | HEART RATE: 92 BPM | OXYGEN SATURATION: 98 %

## 2018-04-23 VITALS
DIASTOLIC BLOOD PRESSURE: 70 MMHG | HEART RATE: 96 BPM | TEMPERATURE: 98.4 F | SYSTOLIC BLOOD PRESSURE: 106 MMHG | RESPIRATION RATE: 23 BRPM | OXYGEN SATURATION: 99 %

## 2018-04-23 VITALS
OXYGEN SATURATION: 100 % | DIASTOLIC BLOOD PRESSURE: 63 MMHG | HEART RATE: 102 BPM | RESPIRATION RATE: 23 BRPM | TEMPERATURE: 98.8 F | SYSTOLIC BLOOD PRESSURE: 105 MMHG

## 2018-04-23 VITALS
OXYGEN SATURATION: 100 % | RESPIRATION RATE: 23 BRPM | TEMPERATURE: 98.5 F | HEART RATE: 107 BPM | SYSTOLIC BLOOD PRESSURE: 102 MMHG | DIASTOLIC BLOOD PRESSURE: 65 MMHG

## 2018-04-23 VITALS
OXYGEN SATURATION: 99 % | HEART RATE: 96 BPM | SYSTOLIC BLOOD PRESSURE: 110 MMHG | RESPIRATION RATE: 21 BRPM | DIASTOLIC BLOOD PRESSURE: 64 MMHG | TEMPERATURE: 98.5 F

## 2018-04-23 VITALS
HEART RATE: 101 BPM | SYSTOLIC BLOOD PRESSURE: 111 MMHG | OXYGEN SATURATION: 100 % | TEMPERATURE: 98.4 F | RESPIRATION RATE: 18 BRPM | DIASTOLIC BLOOD PRESSURE: 68 MMHG

## 2018-04-23 VITALS
HEART RATE: 103 BPM | RESPIRATION RATE: 29 BRPM | OXYGEN SATURATION: 100 % | DIASTOLIC BLOOD PRESSURE: 60 MMHG | TEMPERATURE: 98.5 F | SYSTOLIC BLOOD PRESSURE: 115 MMHG

## 2018-04-23 VITALS
HEART RATE: 94 BPM | SYSTOLIC BLOOD PRESSURE: 102 MMHG | DIASTOLIC BLOOD PRESSURE: 59 MMHG | RESPIRATION RATE: 21 BRPM | OXYGEN SATURATION: 98 %

## 2018-04-23 VITALS — HEART RATE: 97 BPM

## 2018-04-23 VITALS
TEMPERATURE: 98.8 F | HEART RATE: 107 BPM | RESPIRATION RATE: 15 BRPM | SYSTOLIC BLOOD PRESSURE: 101 MMHG | DIASTOLIC BLOOD PRESSURE: 63 MMHG | OXYGEN SATURATION: 100 %

## 2018-04-23 VITALS — HEART RATE: 93 BPM

## 2018-04-23 VITALS — HEART RATE: 103 BPM

## 2018-04-23 VITALS — HEART RATE: 91 BPM

## 2018-04-23 VITALS — OXYGEN SATURATION: 96 %

## 2018-04-23 VITALS — OXYGEN SATURATION: 100 %

## 2018-04-23 VITALS — HEART RATE: 85 BPM

## 2018-04-23 VITALS — HEART RATE: 101 BPM

## 2018-04-23 LAB
ALBUMIN SERPL-MCNC: 3.8 GM/DL (ref 3.4–5)
ALP SERPL-CCNC: 70 U/L (ref 45–117)
ALT SERPL-CCNC: 35 U/L (ref 12–78)
AST SERPL-CCNC: 138 U/L (ref 15–37)
BASOPHILS # BLD AUTO: 0 TH/MM3 (ref 0–0.2)
BASOPHILS NFR BLD: 0.1 % (ref 0–2)
BILIRUB SERPL-MCNC: 1.1 MG/DL (ref 0.2–1)
BUN SERPL-MCNC: 39 MG/DL (ref 7–18)
CALCIUM SERPL-MCNC: 8.9 MG/DL (ref 8.5–10.1)
CHLORIDE SERPL-SCNC: 100 MEQ/L (ref 98–107)
CREAT SERPL-MCNC: 2.03 MG/DL (ref 0.6–1.3)
EOSINOPHIL # BLD: 0 TH/MM3 (ref 0–0.4)
EOSINOPHIL NFR BLD: 0 % (ref 0–4)
ERYTHROCYTE [DISTWIDTH] IN BLOOD BY AUTOMATED COUNT: 20.2 % (ref 11.6–17.2)
GFR SERPLBLD BASED ON 1.73 SQ M-ARVRAT: 32 ML/MIN (ref 89–?)
GLUCOSE SERPL-MCNC: 138 MG/DL (ref 74–106)
HCO3 BLD-SCNC: 22.7 MEQ/L (ref 21–32)
HCT VFR BLD CALC: 25.2 % (ref 39–51)
HCT VFR BLD CALC: 26.8 % (ref 39–51)
HGB BLD-MCNC: 8.5 GM/DL (ref 13–17)
HGB BLD-MCNC: 8.9 GM/DL (ref 13–17)
LYMPHOCYTES # BLD AUTO: 0.7 TH/MM3 (ref 1–4.8)
LYMPHOCYTES NFR BLD AUTO: 6.1 % (ref 9–44)
MCH RBC QN AUTO: 27.5 PG (ref 27–34)
MCHC RBC AUTO-ENTMCNC: 33.1 % (ref 32–36)
MCV RBC AUTO: 83 FL (ref 80–100)
MONOCYTE #: 0.8 TH/MM3 (ref 0–0.9)
MONOCYTES NFR BLD: 7.3 % (ref 0–8)
NEUTROPHILS # BLD AUTO: 9.5 TH/MM3 (ref 1.8–7.7)
NEUTROPHILS NFR BLD AUTO: 86.5 % (ref 16–70)
PLATELET # BLD: 277 TH/MM3 (ref 150–450)
PMV BLD AUTO: 8.9 FL (ref 7–11)
PROT SERPL-MCNC: 6.5 GM/DL (ref 6.4–8.2)
RBC # BLD AUTO: 3.23 MIL/MM3 (ref 4.5–5.9)
SODIUM SERPL-SCNC: 135 MEQ/L (ref 136–145)
TROPONIN I SERPL-MCNC: (no result) NG/ML (ref 0.02–0.05)
TROPONIN I SERPL-MCNC: 19.3 NG/ML (ref 0.02–0.05)
WBC # BLD AUTO: 11 TH/MM3 (ref 4–11)

## 2018-04-23 RX ADMIN — Medication SCH ML: at 09:09

## 2018-04-23 RX ADMIN — IPRATROPIUM BROMIDE AND ALBUTEROL SULFATE SCH AMPULE: .5; 3 SOLUTION RESPIRATORY (INHALATION) at 07:36

## 2018-04-23 RX ADMIN — PHENYTOIN SODIUM SCH MLS/HR: 50 INJECTION INTRAMUSCULAR; INTRAVENOUS at 13:40

## 2018-04-23 RX ADMIN — IPRATROPIUM BROMIDE AND ALBUTEROL SULFATE SCH AMPULE: .5; 3 SOLUTION RESPIRATORY (INHALATION) at 20:36

## 2018-04-23 RX ADMIN — INSULIN ASPART SCH: 100 INJECTION, SOLUTION INTRAVENOUS; SUBCUTANEOUS at 08:00

## 2018-04-23 RX ADMIN — INSULIN ASPART SCH: 100 INJECTION, SOLUTION INTRAVENOUS; SUBCUTANEOUS at 17:00

## 2018-04-23 RX ADMIN — IPRATROPIUM BROMIDE AND ALBUTEROL SULFATE SCH AMPULE: .5; 3 SOLUTION RESPIRATORY (INHALATION) at 15:25

## 2018-04-23 RX ADMIN — IPRATROPIUM BROMIDE AND ALBUTEROL SULFATE SCH AMPULE: .5; 3 SOLUTION RESPIRATORY (INHALATION) at 03:41

## 2018-04-23 RX ADMIN — FUROSEMIDE SCH MG: 20 TABLET ORAL at 09:08

## 2018-04-23 RX ADMIN — PANTOPRAZOLE SCH MG: 40 TABLET, DELAYED RELEASE ORAL at 09:00

## 2018-04-23 RX ADMIN — INSULIN ASPART SCH: 100 INJECTION, SOLUTION INTRAVENOUS; SUBCUTANEOUS at 21:00

## 2018-04-23 RX ADMIN — PANTOPRAZOLE SODIUM SCH MLS/HR: 40 INJECTION, POWDER, FOR SOLUTION INTRAVENOUS at 15:16

## 2018-04-23 RX ADMIN — Medication SCH ML: at 21:00

## 2018-04-23 RX ADMIN — PHENYTOIN SODIUM SCH MLS/HR: 50 INJECTION INTRAMUSCULAR; INTRAVENOUS at 06:22

## 2018-04-23 RX ADMIN — INSULIN ASPART SCH: 100 INJECTION, SOLUTION INTRAVENOUS; SUBCUTANEOUS at 12:00

## 2018-04-23 RX ADMIN — CHLORHEXIDINE GLUCONATE SCH PACK: 500 CLOTH TOPICAL at 04:00

## 2018-04-23 RX ADMIN — PANTOPRAZOLE SODIUM SCH MLS/HR: 40 INJECTION, POWDER, FOR SOLUTION INTRAVENOUS at 06:21

## 2018-04-23 NOTE — PD.CONS
HPI


History of Present Illness


This is a 77 year old male with hx CAD, DM, CHF who presented with n/v and 

decreased appetite.  Onset yesterday.  No blood in emesis.  He has noticed 

black tarry stools in the last week.  He was evaluate by our service in 

February and had EGD and colonoscopy 2/24/18 no bleeding seen.  He was 

scheduled for outpt capsule endoscopy at [facility] today.  Denies abd pain, n/v 

currently.  Pt is poor historian.


 (Rupal Bauer)





PFSH


Past Medical History


Diabetes mellitus


Hypertension


Hyperlipidemia


Coronary artery disease


Past Surgical History


Cardiac catheterization


EGD/colonoscopy


 (Rupal Bauer)


Coded Allergies:  


     No Known Allergies (Verified  Allergy, Unknown, 4/22/18)


Family History


Mother with diabetes mellitus


Social History


Denies alcohol, tobacco and illicit drugs.


 (Rupal Bauer)





Review of Systems


Constitutional:  DENIES: Fever


Endocrine:  DENIES: Polydipsia


Eyes:  DENIES: Blurred vision


Ears, nose, mouth, throat:  DENIES: Hearing loss


Respiratory:  DENIES: Cough


Cardiovascular:  DENIES: Chest pain


Gastrointestinal:  COMPLAINS OF: Black stools, DENIES: Abdominal pain, Bloody 

stools, Nausea, Vomiting


Genitourinary:  DENIES: Hematuria


Musculoskeletal:  DENIES: Muscle aches


Integumentary:  DENIES: Jaundice


Immunologic/allergic:  DENIES: Eczema


Neurologic:  DENIES: Headache


Psychiatric:  DENIES: Confusion (Rupal Bauer)





GI Exam


Vitals I&O





Vital Signs








  Date Time  Temp Pulse Resp B/P (MAP) Pulse Ox O2 Delivery O2 Flow Rate FiO2


 


4/23/18 07:37     96 Nasal Cannula 2.00 


 


4/23/18 06:00  93      


 


4/23/18 04:00  96      


 


4/23/18 04:00 98.4 96 23 106/70 (82) 99   


 


4/23/18 03:00 98.8 102 23 105/63 100   


 


4/23/18 02:38 98.4 101 18 111/68 100   


 


4/23/18 02:00  101      


 


4/23/18 00:30 98.8 107 15 101/63 100   


 


4/23/18 00:00  107      


 


4/23/18 00:00 98.5 107 23 102/65 (77) 100   


 


4/22/18 23:53 98.5 105 21 103/59 100   


 


4/22/18 22:23  113 27 106/60 100   


 


4/22/18 22:00 97.7 113 19 108/65 100   


 


4/22/18 22:00  113      


 


4/22/18 21:30  114 21 107/61 100   


 


4/22/18 21:08     99 Nasal Cannula 3.00 


 


4/22/18 21:05 97.9 115 25 111/59 100   


 


4/22/18 21:00 97.9 115 22 111/59 (76) 100   


 


4/22/18 21:00  115      


 


4/22/18 20:38        


 


4/22/18 20:11  110 18 107/62 (77) 99 Nasal Cannula 2.00 


 


4/22/18 18:00  110 18 118/56 (76) 98 Room Air  


 


4/22/18 17:08 97.8 117 20 99/55 (70) 99   














I/O      


 


 4/22/18 4/22/18 4/22/18 4/23/18 4/23/18 4/23/18





 07:00 15:00 23:00 07:00 15:00 23:00


 


Intake Total   30 ml 1230 ml  


 


Balance   30 ml 1230 ml  


 


      


 


Intake IV Total    100 ml  


 


Packed Cells    1035 ml  


 


Blood Product IV Normal Saline Flush   30 ml 95 ml  








Imaging





Last Impressions








Abdomen/Pelvis CT 4/22/18 1947 Signed





Impressions: 





 Service Date/Time:  Sunday, April 22, 2018 20:13 - CONCLUSION:  1. No acute 





 finding is identified to explain the clinical symptoms. 2. Nonacute findings 





 include cholelithiasis, severe atherosclerotic disease, and prostatomegaly.   

  





 Adi Niño MD 


 


Chest X-Ray 4/22/18 1733 Signed





Impressions: 





 Service Date/Time:  Sunday, April 22, 2018 17:50 - CONCLUSION:  Underinflation 





 with atelectasis at the lung bases. Otherwise, no acute cardiopulmonary 





 abnormality is identified.     Adi Niño MD 








Laboratory











Test


  4/22/18


18:07 4/22/18


20:50 4/23/18


00:30 4/23/18


05:58


 


White Blood Count 12.6 TH/MM3    11.0 TH/MM3 


 


Red Blood Count 2.03 MIL/MM3    3.23 MIL/MM3 


 


Hemoglobin 5.5 GM/DL    8.9 GM/DL 


 


Hematocrit 17.4 %    26.8 % 


 


Mean Corpuscular Volume 85.8 FL    83.0 FL 


 


Mean Corpuscular Hemoglobin 27.0 PG    27.5 PG 


 


Mean Corpuscular Hemoglobin


Concent 31.5 % 


  


  


  33.1 % 


 


 


Red Cell Distribution Width 21.4 %    20.2 % 


 


Platelet Count 390 TH/MM3    277 TH/MM3 


 


Mean Platelet Volume 9.6 FL    8.9 FL 


 


Neutrophils (%) (Auto) 91.6 %    86.5 % 


 


Lymphocytes (%) (Auto) 3.1 %    6.1 % 


 


Monocytes (%) (Auto) 5.0 %    7.3 % 


 


Eosinophils (%) (Auto) 0.0 %    0.0 % 


 


Basophils (%) (Auto) 0.3 %    0.1 % 


 


Neutrophils # (Auto) 11.6 TH/MM3    9.5 TH/MM3 


 


Lymphocytes # (Auto) 0.4 TH/MM3    0.7 TH/MM3 


 


Monocytes # (Auto) 0.6 TH/MM3    0.8 TH/MM3 


 


Eosinophils # (Auto) 0.0 TH/MM3    0.0 TH/MM3 


 


Basophils # (Auto) 0.0 TH/MM3    0.0 TH/MM3 


 


CBC Comment DIFF FINAL    DIFF FINAL 


 


Differential Comment      


 


Prothrombin Time 11.4 SEC    


 


Prothromb Time International


Ratio 1.1 RATIO 


  


  


  


 


 


Activated Partial


Thromboplast Time 22.9 SEC 


  


  


  


 


 


Blood Urea Nitrogen 36 MG/DL    39 MG/DL 


 


Creatinine 2.07 MG/DL    2.03 MG/DL 


 


Random Glucose 160 MG/DL    138 MG/DL 


 


Total Protein 6.8 GM/DL    6.5 GM/DL 


 


Albumin 3.8 GM/DL    3.8 GM/DL 


 


Calcium Level 9.1 MG/DL    8.9 MG/DL 


 


Alkaline Phosphatase 72 U/L    70 U/L 


 


Aspartate Amino Transf


(AST/SGOT) 32 U/L 


  


  


  138 U/L 


 


 


Alanine Aminotransferase


(ALT/SGPT) 30 U/L 


  


  


  35 U/L 


 


 


Total Bilirubin 0.6 MG/DL    1.1 MG/DL 


 


Sodium Level 131 MEQ/L    135 MEQ/L 


 


Potassium Level 5.1 MEQ/L    5.1 MEQ/L 


 


Chloride Level 99 MEQ/L    100 MEQ/L 


 


Carbon Dioxide Level 15.3 MEQ/L    22.7 MEQ/L 


 


Anion Gap 17 MEQ/L    12 MEQ/L 


 


Estimat Glomerular Filtration


Rate 31 ML/MIN 


  


  


  32 ML/MIN 


 


 


Troponin I


  2.45 NG/ML 


  


  19.30 NG/ML 


  GREATER THAN


40.00 NG/ML


 


Lipase 316 U/L    


 


Nasal Screen MRSA (PCR)


  


  MRSA NOT


DETECTED 


  


 


 


Total Creatine Kinase   337 U/L  642 U/L 


 


Creatine Kinase MB   23.2 NG/ML  53.9 NG/ML 


 


Creatine Kinase MB %   6.9 %  8.4 % 








Physical Examination


HEENT: PERRL; normocephalic; atraumatic; no jaundice.  


CHEST:  CTA


CARDIAC:  RRR + murmur


ABDOMEN:  Soft, nondistended, nontender; no hepatosplenomegaly; bowel sounds 

are present in all four quadrants.


EXTREMITIES: No clubbing, cyanosis, or edema.


SKIN:  Normal; no rash; no jaundice.


CNS: alert


 (Rupal Bauer)





Assessment and Plan


Plan


ASSESSMENT


- anemia, black tarry stool - poss UGIB location unclear.  had EGD and 

colonoscopy 2/24/18 no bleeding seen


   scheduled for outpt CE today.  ASA and plavix held.  per cardiology he is 

high risk for procedure but its "not


   totally unreasonable."   CT showed no acute findings


-NSTEMI - cardiology following.  needs TAVR but not good candidate and would 

need anemia resolved





PLAN


- outpt capsule endoscopy vs EGD & enteroscopy, he is high cardiac risk for 

procedures


- monitor labs


- transfuse as needed


- notify GI of active bleeding


- further recs to follow


pt seen by myself and Dr Cedeño and this note is on his behalf


 (Rupal Bauer)


Physician Comments


Patient seen and examined


Agree with above


Continue with current supportive care


Monitor lab


With patient presenting with a hemoglobin of 5-1/2 and melena and with his 

obvious need for antiplatelet agents although he is high risk for procedures I 

feel we need to pursue an upper endoscopy at this point so as to assess damage 

and suitability for anticoagulation


 (Sim Cedeño MD)











Rupal Bauer Apr 23, 2018 12:16


Sim Cedeño MD Apr 23, 2018 23:37

## 2018-04-23 NOTE — HHI.PR
Subjective


Remarks


Follow-up GI bleed, anemia, non-ST elevation MI.  Patient states that he feels 

better this morning.  He denies chest pain or dyspnea.  Denies nausea or 

vomiting at this time.





Objective


Vitals





Vital Signs








  Date Time  Temp Pulse Resp B/P (MAP) Pulse Ox O2 Delivery O2 Flow Rate FiO2


 


4/23/18 07:37     96 Nasal Cannula 2.00 


 


4/23/18 06:00  93      


 


4/23/18 04:00  96      


 


4/23/18 04:00 98.4 96 23 106/70 (82) 99   


 


4/23/18 03:00 98.8 102 23 105/63 100   


 


4/23/18 02:38 98.4 101 18 111/68 100   


 


4/23/18 02:00  101      


 


4/23/18 00:30 98.8 107 15 101/63 100   


 


4/23/18 00:00  107      


 


4/23/18 00:00 98.5 107 23 102/65 (77) 100   


 


4/22/18 23:53 98.5 105 21 103/59 100   


 


4/22/18 22:23  113 27 106/60 100   


 


4/22/18 22:00 97.7 113 19 108/65 100   


 


4/22/18 22:00  113      


 


4/22/18 21:30  114 21 107/61 100   


 


4/22/18 21:08     99 Nasal Cannula 3.00 


 


4/22/18 21:05 97.9 115 25 111/59 100   


 


4/22/18 21:00 97.9 115 22 111/59 (76) 100   


 


4/22/18 21:00  115      


 


4/22/18 20:38        


 


4/22/18 20:11  110 18 107/62 (77) 99 Nasal Cannula 2.00 


 


4/22/18 18:00  110 18 118/56 (76) 98 Room Air  


 


4/22/18 17:08 97.8 117 20 99/55 (70) 99   














I/O      


 


 4/22/18 4/22/18 4/22/18 4/23/18 4/23/18 4/23/18





 07:00 15:00 23:00 07:00 15:00 23:00


 


Intake Total   30 ml 1230 ml  


 


Balance   30 ml 1230 ml  


 


      


 


Intake IV Total    100 ml  


 


Packed Cells    1035 ml  


 


Blood Product IV Normal Saline Flush   30 ml 95 ml  








Result Diagram:  


4/23/18 0558                                                                   

             4/23/18 0558





Imaging





Last Impressions








Abdomen/Pelvis CT 4/22/18 1947 Signed





Impressions: 





 Service Date/Time:  Sunday, April 22, 2018 20:13 - CONCLUSION:  1. No acute 





 finding is identified to explain the clinical symptoms. 2. Nonacute findings 





 include cholelithiasis, severe atherosclerotic disease, and prostatomegaly.   

  





 Adi Niño MD 


 


Chest X-Ray 4/22/18 1733 Signed





Impressions: 





 Service Date/Time:  Sunday, April 22, 2018 17:50 - CONCLUSION:  Underinflation 





 with atelectasis at the lung bases. Otherwise, no acute cardiopulmonary 





 abnormality is identified.     Adi Niño MD 








Objective Remarks


General: No acute distress.


Heart: Regular rate and rhythm.  2/6 systolic murmur.


Lungs: Clear to auscultation bilaterally. No wheezes, rales, or rhonchi. 

Breathing is nonlabored.


Abdomen: Soft, nontender, nondistended.


Extremities: No lower extremity edema.  SCDs.


Psych: Alert and oriented.


Neuro: Normal speech.  No focal deficits noted.


Procedures


None


Urinary Catheter:  No


Vascular Central Line Catheter:  No





A/P


Assessment and Plan


1.  GI bleed, symptomatic anemia: H&H improved following transfusion.  Continue 

IV Protonix drip.  GI consult pending.  Patient was scheduled for capsule 

endoscopy as an outpatient today.  N.p.o. until evaluated by gastroenterology.


2.  Non-ST elevation MI: Likely secondary to severe anemia.  Patient has known 

history of coronary artery disease.  Status post cardiac catheterization on 4/1/ 18, which showed severe aortic stenosis with total occlusion of nondominant 

right coronary artery and 50% stenosis of the distal circumflex.  Medical 

management was recommended at that time.  Appreciate cardiology 

recommendations.  Aspirin, Plavix on hold secondary to GI bleed.  No 

anticoagulation at this time secondary to bleeding.  Beta-blocker on hold.


3.  Hypertension: Lisinopril, Coreg on hold.


4.  Hyperlipidemia: Not on medications.


5.  Diabetes mellitus: Monitor Accu-Cheks and cover with sliding scale insulin.


6.  Acute kidney injury superimposed on chronic kidney disease stage III: 

Gentle IV fluid hydration.  Monitor BUN and creatinine.


7.  DVT prophylaxis: SCDs.  Chemical prophylaxis on hold secondary to GI bleed.


8.  Severe aortic stenosis: Not a candidate for surgery at this time.











Kendall Fitzpatrick MD Apr 23, 2018 08:51

## 2018-04-23 NOTE — ECHRPT
Indication:   CAD

 

 CONCLUSIONS

 The left ventricular systolic function is severely reduced with an estimated ejection fraction in th
e range of 

 30-35%.   Normal left ventricular cavity size.

 There is global hypokinesis and a small area of apical dyskinesis.  

 Mild concentric left ventricular hypertrophy. 

 

 Mild mitral annular calcification is present. 

 Mild mitral valve regurgitation. 

 

 Trileaflet aortic valve. 

 Diffuse moderate calcification and thickening of the aortic valve. 

 Possible severe aortic valve stenosis with a mean gradient of 44 mmHg. 

 Mild aortic valve regurgitation. 

 

 There is mild tricuspid valve regurgitation. 

 The estimated pulmonary arterial pressure is 47 mmHg. 

 

 BP:  106   / 70      HR: 92                       Rhythm:

 

 MEASUREMENTS  (Male / Female) Normal Values       Technical Quality:Fair

 2D ECHO

 LV Diastolic Diameter PLAX        5.3 cm                4.2 - 5.9 / 3.9 - 5.3 cm

 LV Systolic Diameter PLAX         4.8 cm                

 IVS Diastolic Thickness           1.1 cm                0.6 - 1.0 / 0.6 - 0.9 cm

 LVPW Diastolic Thickness          1.1 cm                0.6 - 1.0 / 0.6 - 0.9 cm

 LV Relative Wall Thickness        0.4                   

 RV Internal Dim ED PLAX           3.6 cm                

 LVOT Diameter                     2.2 cm                

 LA Systolic Diameter LX           4.4 cm                3.0 - 4.0 / 2.7 - 3.8 cm

 

 M-MODE

 Aortic Root Diameter MM           2.9 cm                

 LA Systolic Diameter MM           4.4 cm                

 LA Ao Ratio MM                    1.5                   

 AV Cusp Separation MM             0.9 cm                

 

 DOPPLER

 AV Peak Velocity                  428.0 cm/s            

 AV Peak Gradient                  73.3 mmHg             

 AV Mean Gradient                  44.0 mmHg             

 AV Velocity Time Integral         95.3 cm               

 AI Peak Velocity                  336.7 cm/s            

 AI Peak Gradient                  45.3 mmHg             

 AI Pressure Half Time             237.0 ms              

 LVOT Peak Velocity                59.7 cm/s             

 LVOT Peak Gradient                1.4 mmHg              

 LVOT Velocity Time Integral       13.2 cm               

 LVOT Cardiac Index                2365.4 cm/minm     

 AV Area Cont Eq vti               0.5 cm               

 AV Area Cont Eq pk                0.5 cm               

 MV Area PHT                       3.7 cm               

 Mitral E Point Velocity           106.0 cm/s            

 Mitral A Point Velocity           123.0 cm/s            

 Mitral E to A Ratio               0.9                   

 LV E' Lateral Velocity            7.2 cm/s              

 Mitral E to LV E' Lateral Ratio   14.6                  

 LV E' Septal Velocity             6.3 cm/s              

 Mitral E to LV E' Septal Ratio    16.8                  

 TR Peak Velocity                  308.0 cm/s            

 TR Peak Gradient                  37.9 mmHg             

 Right Atrial Pressure             10.0 mmHg             

 Pulmonary Artery Systolic Pressu  47.9 mmHg             

 Right Ventricular Systolic Press  47.9 mmHg             

 

 

 FINDINGS

 

 LEFT VENTRICLE

 The left ventricular systolic function is severely reduced with an estimated ejection fraction in th
e range of 

 30-35%.   Normal left ventricular cavity size.

 There is diffuse hypokinesis and a small area of apical dyskinesis.  

 Mild concentric left ventricular hypertrophy. 

 

 RIGHT VENTRICLE

 Normal right ventricular size and systolic function.  

 

 LEFT ATRIUM

 The left atrial size is normal.  

 

 RIGHT ATRIUM

 The right atrial size is normal.  

 

 ATRIAL SEPTUM

 Normal atrial septal thickness without atrial level shunting by limited color doppler interrogation.
  

 

 AORTA

 The aortic root and proximal ascending aorta are normal in size on limited imaging.  

 

 MITRAL VALVE

 Mild mitral annular calcification is present. 

 Mild mitral valve regurgitation. 

 

 

 AORTIC VALVE

 Trileaflet aortic valve. 

 Diffuse moderate calcification and thickening of the aortic valve. 

 Possible severe aortic valve stenosis with a mean gradient of 44 mmHg. 

 Mild aortic valve regurgitation. 

 

 TRICUSPID VALVE

 Structurally normal tricuspid valve. 

 There is mild tricuspid valve regurgitation. 

 The estimated pulmonary arterial pressure is 47 mmHg. 

 

 PULMONARY VALVE

 Mild pulmonary valve regurgitation. 

 

 VESSELS

 The inferior vena cava is normal in size.  

 

 PERICARDIUM

 No pericardial effusion.  

 

 

 

 

  Wily Walton MD

  (Electronically Signed)

  Final Date:23 April 2018 17:33

## 2018-04-24 VITALS
RESPIRATION RATE: 28 BRPM | OXYGEN SATURATION: 99 % | DIASTOLIC BLOOD PRESSURE: 77 MMHG | TEMPERATURE: 98.3 F | HEART RATE: 97 BPM | SYSTOLIC BLOOD PRESSURE: 134 MMHG

## 2018-04-24 VITALS — HEART RATE: 88 BPM

## 2018-04-24 VITALS
OXYGEN SATURATION: 99 % | RESPIRATION RATE: 19 BRPM | TEMPERATURE: 98.6 F | SYSTOLIC BLOOD PRESSURE: 117 MMHG | HEART RATE: 99 BPM | DIASTOLIC BLOOD PRESSURE: 67 MMHG

## 2018-04-24 VITALS
RESPIRATION RATE: 17 BRPM | SYSTOLIC BLOOD PRESSURE: 104 MMHG | HEART RATE: 93 BPM | DIASTOLIC BLOOD PRESSURE: 64 MMHG | OXYGEN SATURATION: 97 % | TEMPERATURE: 98.3 F

## 2018-04-24 VITALS
HEART RATE: 97 BPM | SYSTOLIC BLOOD PRESSURE: 109 MMHG | RESPIRATION RATE: 26 BRPM | OXYGEN SATURATION: 98 % | TEMPERATURE: 98.3 F | DIASTOLIC BLOOD PRESSURE: 69 MMHG

## 2018-04-24 VITALS
OXYGEN SATURATION: 97 % | RESPIRATION RATE: 26 BRPM | DIASTOLIC BLOOD PRESSURE: 57 MMHG | SYSTOLIC BLOOD PRESSURE: 106 MMHG | TEMPERATURE: 98.6 F | HEART RATE: 82 BPM

## 2018-04-24 VITALS
DIASTOLIC BLOOD PRESSURE: 60 MMHG | OXYGEN SATURATION: 100 % | TEMPERATURE: 98.6 F | SYSTOLIC BLOOD PRESSURE: 118 MMHG | HEART RATE: 95 BPM | RESPIRATION RATE: 16 BRPM

## 2018-04-24 VITALS
TEMPERATURE: 98.7 F | RESPIRATION RATE: 26 BRPM | OXYGEN SATURATION: 100 % | DIASTOLIC BLOOD PRESSURE: 59 MMHG | SYSTOLIC BLOOD PRESSURE: 129 MMHG | HEART RATE: 91 BPM

## 2018-04-24 VITALS — HEART RATE: 102 BPM

## 2018-04-24 VITALS
DIASTOLIC BLOOD PRESSURE: 60 MMHG | SYSTOLIC BLOOD PRESSURE: 104 MMHG | RESPIRATION RATE: 22 BRPM | OXYGEN SATURATION: 99 % | HEART RATE: 88 BPM

## 2018-04-24 VITALS
SYSTOLIC BLOOD PRESSURE: 127 MMHG | DIASTOLIC BLOOD PRESSURE: 60 MMHG | HEART RATE: 100 BPM | TEMPERATURE: 98 F | OXYGEN SATURATION: 100 % | RESPIRATION RATE: 28 BRPM

## 2018-04-24 VITALS — OXYGEN SATURATION: 100 %

## 2018-04-24 VITALS
SYSTOLIC BLOOD PRESSURE: 129 MMHG | HEART RATE: 92 BPM | RESPIRATION RATE: 22 BRPM | OXYGEN SATURATION: 100 % | DIASTOLIC BLOOD PRESSURE: 59 MMHG | TEMPERATURE: 98.7 F

## 2018-04-24 VITALS — HEART RATE: 91 BPM

## 2018-04-24 VITALS — HEART RATE: 94 BPM

## 2018-04-24 VITALS — HEART RATE: 93 BPM

## 2018-04-24 VITALS — OXYGEN SATURATION: 99 %

## 2018-04-24 VITALS — HEART RATE: 92 BPM

## 2018-04-24 LAB
ALBUMIN SERPL-MCNC: 3 GM/DL (ref 3.4–5)
ALP SERPL-CCNC: 60 U/L (ref 45–117)
ALT SERPL-CCNC: 28 U/L (ref 12–78)
AST SERPL-CCNC: 85 U/L (ref 15–37)
BASOPHILS # BLD AUTO: 0 TH/MM3 (ref 0–0.2)
BASOPHILS NFR BLD: 0 % (ref 0–2)
BILIRUB SERPL-MCNC: 0.8 MG/DL (ref 0.2–1)
BUN SERPL-MCNC: 38 MG/DL (ref 7–18)
CALCIUM SERPL-MCNC: 7.6 MG/DL (ref 8.5–10.1)
CHLORIDE SERPL-SCNC: 107 MEQ/L (ref 98–107)
CREAT SERPL-MCNC: 1.86 MG/DL (ref 0.6–1.3)
EOSINOPHIL # BLD: 0 TH/MM3 (ref 0–0.4)
EOSINOPHIL NFR BLD: 0.1 % (ref 0–4)
ERYTHROCYTE [DISTWIDTH] IN BLOOD BY AUTOMATED COUNT: 19.9 % (ref 11.6–17.2)
GFR SERPLBLD BASED ON 1.73 SQ M-ARVRAT: 35 ML/MIN (ref 89–?)
GLUCOSE SERPL-MCNC: 126 MG/DL (ref 74–106)
HCO3 BLD-SCNC: 23 MEQ/L (ref 21–32)
HCT VFR BLD CALC: 21.8 % (ref 39–51)
HCT VFR BLD CALC: 28.8 % (ref 39–51)
HGB BLD-MCNC: 10 GM/DL (ref 13–17)
HGB BLD-MCNC: 7.3 GM/DL (ref 13–17)
LYMPHOCYTES # BLD AUTO: 0.4 TH/MM3 (ref 1–4.8)
LYMPHOCYTES NFR BLD AUTO: 4.2 % (ref 9–44)
MAGNESIUM SERPL-MCNC: 2.1 MG/DL (ref 1.5–2.5)
MCH RBC QN AUTO: 27.7 PG (ref 27–34)
MCHC RBC AUTO-ENTMCNC: 33.4 % (ref 32–36)
MCV RBC AUTO: 82.9 FL (ref 80–100)
MONOCYTE #: 0.8 TH/MM3 (ref 0–0.9)
MONOCYTES NFR BLD: 8.9 % (ref 0–8)
NEUTROPHILS # BLD AUTO: 7.7 TH/MM3 (ref 1.8–7.7)
NEUTROPHILS NFR BLD AUTO: 86.8 % (ref 16–70)
PHOSPHATE SERPL-MCNC: 3.9 MG/DL (ref 2.5–4.9)
PLATELET # BLD: 207 TH/MM3 (ref 150–450)
PMV BLD AUTO: 8.8 FL (ref 7–11)
PROT SERPL-MCNC: 5.5 GM/DL (ref 6.4–8.2)
RBC # BLD AUTO: 2.62 MIL/MM3 (ref 4.5–5.9)
SODIUM SERPL-SCNC: 139 MEQ/L (ref 136–145)
WBC # BLD AUTO: 8.9 TH/MM3 (ref 4–11)

## 2018-04-24 RX ADMIN — CHLORHEXIDINE GLUCONATE SCH PACK: 500 CLOTH TOPICAL at 04:00

## 2018-04-24 RX ADMIN — IPRATROPIUM BROMIDE AND ALBUTEROL SULFATE SCH AMPULE: .5; 3 SOLUTION RESPIRATORY (INHALATION) at 20:55

## 2018-04-24 RX ADMIN — PANTOPRAZOLE SODIUM SCH MLS/HR: 40 INJECTION, POWDER, FOR SOLUTION INTRAVENOUS at 01:35

## 2018-04-24 RX ADMIN — IPRATROPIUM BROMIDE AND ALBUTEROL SULFATE SCH AMPULE: .5; 3 SOLUTION RESPIRATORY (INHALATION) at 15:50

## 2018-04-24 RX ADMIN — PANTOPRAZOLE SODIUM SCH MLS/HR: 40 INJECTION, POWDER, FOR SOLUTION INTRAVENOUS at 22:52

## 2018-04-24 RX ADMIN — IPRATROPIUM BROMIDE AND ALBUTEROL SULFATE SCH AMPULE: .5; 3 SOLUTION RESPIRATORY (INHALATION) at 04:44

## 2018-04-24 RX ADMIN — PANTOPRAZOLE SCH MG: 40 TABLET, DELAYED RELEASE ORAL at 09:00

## 2018-04-24 RX ADMIN — INSULIN ASPART SCH: 100 INJECTION, SOLUTION INTRAVENOUS; SUBCUTANEOUS at 21:00

## 2018-04-24 RX ADMIN — FUROSEMIDE SCH MG: 20 TABLET ORAL at 12:58

## 2018-04-24 RX ADMIN — Medication SCH ML: at 22:52

## 2018-04-24 RX ADMIN — Medication SCH ML: at 08:00

## 2018-04-24 RX ADMIN — INSULIN ASPART SCH: 100 INJECTION, SOLUTION INTRAVENOUS; SUBCUTANEOUS at 12:00

## 2018-04-24 RX ADMIN — IPRATROPIUM BROMIDE AND ALBUTEROL SULFATE SCH AMPULE: .5; 3 SOLUTION RESPIRATORY (INHALATION) at 10:21

## 2018-04-24 RX ADMIN — INSULIN ASPART SCH: 100 INJECTION, SOLUTION INTRAVENOUS; SUBCUTANEOUS at 17:00

## 2018-04-24 RX ADMIN — PANTOPRAZOLE SODIUM SCH MLS/HR: 40 INJECTION, POWDER, FOR SOLUTION INTRAVENOUS at 12:57

## 2018-04-24 RX ADMIN — INSULIN ASPART SCH: 100 INJECTION, SOLUTION INTRAVENOUS; SUBCUTANEOUS at 08:00

## 2018-04-24 NOTE — EKG
Date Performed: 04/23/2018       Time Performed: 06:16:06

 

PTAGE:      77 years

 

EKG:      Sinus rhythm 

 

 Left ventricular hypertrophy Extensive ST-T changes may be due to hypertrophy and/or ischemia Abnorm
al ECG Compared to 

 

 PREVIOUS TRACING            ,  rate has decreased

 

DOCTOR:   Paul Bo  Interpretating Date/Time  04/24/2018 00:04:19

## 2018-04-24 NOTE — PD.CARD.PN
Subjective


Subjective Remarks


Pt with CP this am





Objective


Medications





Current Medications








 Medications


  (Trade)  Dose


 Ordered  Sig/Ramu


 Route  Start Time


 Stop Time Status Last Admin


 


  (NS Flush)  2 ml  UNSCH  PRN


 IV FLUSH  4/22/18 20:00


     


 


 


  (NS Flush)  2 ml  BID


 IV FLUSH  4/22/18 21:00


    4/23/18 21:00


 


 


 Pantoprazole


 Sodium 80 mg/


 Sodium Chloride  100 ml @ 


 10 mls/hr  Q10H


 IV  4/23/18 04:00


    4/24/18 01:35


 


 


  (D50w (Vial) Inj)  50 ml  UNSCH  PRN


 IV PUSH  4/22/18 20:00


     


 


 


  (Glucagon Inj)  1 mg  UNSCH  PRN


 OTHER  4/22/18 20:00


     


 


 


  (NovoLOG


 SUPPLEMENTAL


 SCALE)  1  ACHS SLIDING  SCALE


 SQ  4/22/18 21:00


     


 


 


  (Duoneb Neb)  1 ampule  Q4HR NEB  PRN


 NEB  4/22/18 20:00


     


 


 


  (Duoneb Neb)  1 ampule  Q6HR  NEB


 NEB  4/22/18 22:00


    4/24/18 04:44


 


 


  (Zofran Inj)  4 mg  Q6H  PRN


 IVP  4/22/18 20:00


     


 


 


  (Tylenol)  650 mg  Q6H  PRN


 PO  4/22/18 20:00


     


 


 


  (Morphine Inj)  2 mg  Q3H  PRN


 IV PUSH  4/22/18 20:00


     


 


 


  (Narcan Inj)  0.4 mg  UNSCH  PRN


 IV PUSH  4/22/18 20:00


     


 


 


  (Protonix)  40 mg  DAILY


 PO  4/23/18 09:00


     


 


 


 Sodium Chloride  1,000 ml @ 


 60 mls/hr  [id number]


 IV  4/22/18 21:00


    4/23/18 06:22


 


 


 Miscellaneous


 Information  Patient in


 critical care


 unit?  Ass...  [id number]


 .XX  4/22/18 21:00


    4/22/18 21:00


 


 


  (Chlorhexidine


 2% Cloth)  3 pack  DAILY@04


 TOPICAL  4/23/18 04:00


 4/27/18 04:01  4/23/18 04:00


 


 


  (Chlorhexidine


 2% Cloth)  3 pack  UNSCH  PRN


 TOPICAL  4/22/18 21:00


 4/27/18 20:59   


 


 


  (Lasix)  20 mg  DAILY


 PO  4/23/18 09:00


    4/23/18 09:08


 








Vital Signs / I&O





Vital Signs








  Date Time  Temp Pulse Resp B/P (MAP) Pulse Ox O2 Delivery O2 Flow Rate FiO2


 


4/24/18 06:00  94      


 


4/24/18 04:00  88 22 104/60 (75) 99   


 


4/24/18 04:00  88      


 


4/24/18 02:00  102      


 


4/24/18 00:00  99      


 


4/24/18 00:00 98.6 99 19 117/67 (84) 99   


 


4/23/18 22:00  103      


 


4/23/18 20:36     100 Nasal Cannula 5.00 


 


4/23/18 20:00 98.5 103 29 115/60 (78) 100   


 


4/23/18 20:00  103      


 


4/23/18 18:00  91      


 


4/23/18 16:00  94      


 


4/23/18 16:00  94 21 102/59 (73) 98   


 


4/23/18 14:00  97      


 


4/23/18 12:00 98.5 92 23 107/61 (76) 98   


 


4/23/18 12:00  92      


 


4/23/18 10:00  85      


 


4/23/18 08:00  96      


 


4/23/18 08:00 98.5 96 21 110/64 (79) 99   














I/O      


 


 4/23/18 4/23/18 4/23/18 4/24/18 4/24/18 4/24/18





 07:00 15:00 23:00 07:00 15:00 23:00


 


Intake Total 1230 ml  240 ml 60 ml  


 


Output Total   450 ml 350 ml  


 


Balance 1230 ml  -210 ml -290 ml  


 


      


 


Intake Oral   240 ml 60 ml  


 


IV Total 100 ml     


 


Packed Cells 1035 ml     


 


Blood Product IV Normal Saline Flush 95 ml     


 


Output Urine Total   450 ml 350 ml  








Physical Exam


GENERAL: Well developed, well nourished. No acute distress.


HEENT: Jugular venous pressure is normal. 


CHEST: Lungs clear to auscultation bilaterally. Unlabored respiratory effort.


CARDIAC: Regular rate and rhythm; harsh GEORGES


ABDOMEN: Soft, nontender, no hepatosplenomegaly. Bowel sounds present.


EXTREMITIES: No clubbing, cyanosis, or edema.


Laboratory





Laboratory Tests








Test


  4/23/18


13:45 4/24/18


04:15


 


Hemoglobin 8.5 GM/DL  7.3 GM/DL 


 


Hematocrit 25.2 %  21.8 % 


 


White Blood Count  8.9 TH/MM3 


 


Red Blood Count  2.62 MIL/MM3 


 


Mean Corpuscular Volume  82.9 FL 


 


Mean Corpuscular Hemoglobin  27.7 PG 


 


Mean Corpuscular Hemoglobin


Concent 


  33.4 % 


 


 


Red Cell Distribution Width  19.9 % 


 


Platelet Count  207 TH/MM3 


 


Mean Platelet Volume  8.8 FL 


 


Neutrophils (%) (Auto)  86.8 % 


 


Lymphocytes (%) (Auto)  4.2 % 


 


Monocytes (%) (Auto)  8.9 % 


 


Eosinophils (%) (Auto)  0.1 % 


 


Basophils (%) (Auto)  0.0 % 


 


Neutrophils # (Auto)  7.7 TH/MM3 


 


Lymphocytes # (Auto)  0.4 TH/MM3 


 


Monocytes # (Auto)  0.8 TH/MM3 


 


Eosinophils # (Auto)  0.0 TH/MM3 


 


Basophils # (Auto)  0.0 TH/MM3 


 


CBC Comment  AUTO DIFF 


 


Differential Comment


  


  AUTO DIFF


CONFIRMED


 


Blood Urea Nitrogen  38 MG/DL 


 


Creatinine  1.86 MG/DL 


 


Random Glucose  126 MG/DL 


 


Total Protein  5.5 GM/DL 


 


Albumin  3.0 GM/DL 


 


Calcium Level  7.6 MG/DL 


 


Phosphorus Level  3.9 MG/DL 


 


Magnesium Level  2.1 MG/DL 


 


Alkaline Phosphatase  60 U/L 


 


Aspartate Amino Transf


(AST/SGOT) 


  85 U/L 


 


 


Alanine Aminotransferase


(ALT/SGPT) 


  28 U/L 


 


 


Total Bilirubin  0.8 MG/DL 


 


Sodium Level  139 MEQ/L 


 


Potassium Level  4.1 MEQ/L 


 


Chloride Level  107 MEQ/L 


 


Carbon Dioxide Level  23.0 MEQ/L 


 


Anion Gap  9 MEQ/L 


 


Estimat Glomerular Filtration


Rate 


  35 ML/MIN 


 











Assessment and Plan


Assessment and Plan


1.  Non-ST elevation myocardial infarction - This is most likely a 


secondary event with this profound anemia.  He does have a preexisting


coronary artery disease as was recently defined in the cath lab.  As 


well, he has severe aortic stenosis and at this point has failed the 


Plavix trial.  The Plavix obviously will need to be stopped as well as


any anticoagulation.  I do agree with transfusion that he has received


to treat the inciting factor.  Heparin and aspirin are felt relatively


contraindicated at this point.  Beta blockers and ACE inhibitors are 


also felt relatively contraindicated with his hypotension.  He will be


managed conservatively.  


4/24- CP again this am, pt again with Hb <8








2.  Severe aortic stenosis - The patient, I do not believe, is a 


realistic candidate at this point.  He will have to have the GI source


of bleeding found and stabilized prior to considering any TAVR 


procedure.  As well, he has to have his teeth removed.


4/24- symptomatic AS, ultra high risk for procedures


   -prognosis is poor without TAVR, consider hospice





3.  Cardiomyopathy - As above.  The patient is not going to be able to


tolerate a beta blocker or ACE inhibitor at this point.





4.  Gastrointestinal bleed - s/p 3 UPRBC and now < 8 again with symptoms.


   It may be reasonable to consider EGD if stable/ pain free











Radha Mason MD Apr 24, 2018 07:56

## 2018-04-24 NOTE — PD.CONS
Consult


Service


Palliative Care


.


Consult Requested By


 


Dr. Fitzpatrick


,


Primary Care Physician


Non-Staff


.


Reason for Consultation


   a.  To assist with evaluation and management of symptoms including:  pain; 

nausea; constipation; generalized weakness


   b.  To assist medical decision maker(s) with: better understanding of 

current medical conditions; weighing benefits/burdens of medical treatment 

options; making  medical treatment decisions.


.





HPI


History of Present Illness


This is the 4th acute care  hospitalization since 17 for this 78 y/o 

male with a known history of CAD, severe aortic stenosis, hypertension, 

hyperlipidemia, cardiomyopathy, Type 2 DM, adenomatous polyps,  and GI bleeding 

of uncertain etiology who presented to the ED on 18 c/o vomiting (

nonbloody and non-bilious), diarrhea, and abdominal pain of a few days 

duration.  He had been consuming only water and ginger ale.  He also reported 

black stools and chest pains.  Hg in the ED on this presentation was 5.5.





The patient reports that he was first diagnosed with GI bleeding over 10 years 

ago back in [place]. .  He was c/o of weakness and fatigue and was 

apparently found to have a low Hg.  He underwent endoscopy and he reports no 

significant abnormality was found.  He stopped Plavix and he began feeling much 

better and there was no further bleeding.  I enquired why he was on Plavix back 

then and he told me that he had undergone cardiac cath and echo back then but 

he understood everything was OK.  





More recently, the patient apparently was found to be anemic again by his 

primary doctor with [facility].  He was started on Iron.  He became quite 

constipated.  Because of the constipation, he ended up at [facility] on 17.  He was given treatments to get his bowels moving but 

when they moved, it was very painful.  He says he first noted problems with 

bleeding since that time.  


The patient was noted to be anemic when he was admitted to this hospital on  with dyspnea and lightheadedness.  Hg at that time was also 5.5 mg.


Pan endoscopy on 18 -->


* Irregular Z line and questionable short Lucio biopsy was done from the EG 

junction  (path neg)


* Mild gastritis biopsy from the antrum  (pathology neg)


* Multiple polyps ranging in size between 5 mm and one centimeter, small polyps 

were ablated with heat and large polyp removed by snare  (path showed 

adenomatous polyps)





On 18 he was sent to the ED from his physician's office because of severe 

SOB.   Hg was 11 at that time.  He underwent heart catheterization at that time 

which showed RCA occlusion and 50% stenosis of distal circumflex.  In addition,

  two echocardiograms confirmed severe aortic stenosis. EF was estimated at 30-

35%.  The patient was placed on Plavix to see if he would be able to tolerate 

Plavix for a potential TAVR procedure.





The patient had previously been scheduled to undergo a capsule endoscopy on  which did not take place because of the current hospitalization. 





 


Initial workup on this admission noted the following:


* Initial vital signs showed temperature 97.8; pulse 117; respiratory rate 20; 

blood pressure 99/55; pulse oximetry 99% on room air


* Emergency department examination showed the patient to be in moderate distress

; tachycardic in the low 100s; mild epigastric tenderness to palpation without 

rebound; black/Hemoccult positive stool.  The remainder of the exam was 

unremarkable


* WBC 12.6; hemoglobin 5.5; MCV 85.8


* INR 1.1


* Sodium 131; potassium 5.1; BUN 36; creatinine 2.07; anion gap 17; glucose 160


* Troponin  2.45


* EKG showed sinus tachycardia with T-wave inversion in I, aVL, II, V4-V6.  No 

ST segment elevation/depressions


* Chest x-ray without acute cardiopulmonary abnormality


* Abdominal pelvic CT scan unremarkable





Cardiology and gastroenterology were consulted.  Cardiology felt the patient 

had sustained a non-ST elevation myocardial infarction most likely secondary to 

his profound anemia.  The patient's source of gastrointestinal bleeding remains 

uncertain as the pan endoscopy from 2018 only noted the findings 

described above.





The patient currently is in a difficult position.  He would need to be on 

anticoagulation to undergo TAVR and probably needs anticoagulation just to 

reduce risk of further coronary artery events.  On the other hand, 

anticoagulation significantly increases his risk of further GI bleeding.  

Cardiology has stated, "The patient, I do not believe, is a realistic candidate 

at this point" for a TAVR procedure.   Dr. Mason goes on to say "ultra 

high risk for procedures -- prognosis is poor without TAVR, consider hospice."  

The patient tells me that Dr. Mason told him he would likely die within 

the year without a valve replacement. 





The patient has received 3 units of leukocyte reduced red blood cells at this 

point.  


Hemoglobin continues to fall.





At the time of my visit, the patient denies chest pain or palpitations.  He 

says the pain he gets in the chest feels pressure-like  and does not radiate to 

neck or arms.  He says the pain is often relived when he passes gas.  He has no 

other pain complaints though he continues to report that pain is an issue when 

he moves his bowels.  Bowels remain very irregular and have been so for some 

time. 


The patient reports he get intermittent palpitations and light headedness when 

his hemoglobin is very low.   He is being transfused at time of my visit and he 

reports his weakness is improving.


.


.


Function/Cognitive Trajectory


Patient reports he has had 30 lbs of unintentional weight loss since Dec 2017.  

He reports that between bouts of illness, he feels fairly well.  He normally 

needs no assistive device for ambulation but he will use an electric cart when 

he goes to the supermarket.  He takes care of all of his ADLs.  He drives.  He 

has not been on home 02.


.





Review of Systems


Constitutional:  COMPLAINS OF: Fatigue, Weight loss, Dizziness, Change in 

appetite, Pain, Generalized weakness, DENIES: Fever, Weight gain


Endocrine:  COMPLAINS OF: Polyuria (with furosemide), DENIES: Polydipsia, 

Polyphagia


Eyes:  DENIES: Diplopia, Eye pain, Vision loss


Ears, nose, mouth, throat:  COMPLAINS OF: Epistaxis (One episode in 2018. 

), DENIES: Tinnitus, Hearing loss, Throat pain, Running Nose


Respiratory:  COMPLAINS OF: Shortness of breath, DENIES: Apneas, Cough, Snoring

, Wheezing, Hemoptysis, Sputum production


Cardiovascular:  COMPLAINS OF: Chest pain, Palpitations, Dyspnea on Exertion, 

Lower Extremity Edema, DENIES: Syncope, Orthopnea


Gastrointestinal:  COMPLAINS OF: Abdominal pain, Black stools, Bloody stools, 

Constipation, Diarrhea, Nausea, Vomiting, Anorexia, Dyspepsia or heartburn, 

Excessive gas


Genitourinary:  COMPLAINS OF: Urinary frequency (with lasix), Urgency, DENIES: 

Urinary incontinence, Dysuria


Musculoskeletal:  DENIES: Joint pain, Muscle aches, Back pain, Neck pain


Integumentary:  DENIES: Rash


Hematologic/Lymphatics:  DENIES: Bruising


Immunologic/Allergic:  DENIES: Eczema


Neurologic:  DENIES: Abnormal gait, Headache, Localized weakness, Seizures, 

Tremor, Poor Balance


Psychiatric:  DENIES: Anxiety, Confusion, Depression, Hallucinations, Agitation





Past Family Social History


Coded Allergies:  


     No Known Allergies (Verified  Allergy, Unknown, 18)


Past Medical History


Diabetes mellitus


Hypertension


Hyperlipidemia


Coronary artery disease


Cardiomyopathy


Aortic stenosis


Gout


.


Past Surgical History


Cardiac catheterization


EGD/colonoscopy


.


Reported Medications


Prehospitalization medications included the following:





Pantoprazole (Pantoprazole Sodium) 40 Mg Tab 40 Mg PO DAILY


Potassium Chloride Microencaps 20 Meq Tab 20 Meq PO DAILY


Furosemide 40 Mg Tab 40 Mg PO DAILY


Naproxen 250 Mg Tab 250 Mg PO Q12H PRN


Aspirin DR (Aspirin) 81 Mg Tabdr 81 Mg PO DAILY


Coreg (Carvedilol) 3.125 Mg Tab 3.125 Mg PO Q12HR


Plavix (Clopidogrel Bisulfate) 75 Mg Tab 75 Mg PO DAILY


Vitamin B-12 (Cyanocobalamin) 1,000 Mcg Tab 1,000 Mcg PO DAILY


Lisinopril 5 Mg Tab 5 Mg PO DAILY


Folic Acid 0.4 Mg Tab 400 Mcg PO DAILY


Ferrous Sulfate 325 Mg (65 Mg Iron) Tablet 325 Mg PO DAILY


.





Current Medications








 Medications


  (Trade)  Dose


 Ordered  Sig/Ramu


 Route  Start Time


 Stop Time Status Last Admin


 


  (NS Flush)  2 ml  UNSCH  PRN


 IV FLUSH  18 20:00


     


 


 


  (NS Flush)  2 ml  BID


 IV FLUSH  18 21:00


    18 21:00


 


 


 Pantoprazole


 Sodium 80 mg/


 Sodium Chloride  100 ml @ 


 10 mls/hr  Q10H


 IV  18 04:00


    18 01:35


 


 


  (D50w (Vial) Inj)  50 ml  UNSCH  PRN


 IV PUSH  18 20:00


     


 


 


  (Glucagon Inj)  1 mg  UNSCH  PRN


 OTHER  18 20:00


     


 


 


  (NovoLOG


 SUPPLEMENTAL


 SCALE)  1  ACHS SLIDING  SCALE


 SQ  18 21:00


     


 


 


  (Duoneb Neb)  1 ampule  Q4HR NEB  PRN


 NEB  18 20:00


     


 


 


  (Duoneb Neb)  1 ampule  Q6HR  NEB


 NEB  18 22:00


    18 04:44


 


 


  (Zofran Inj)  4 mg  Q6H  PRN


 IVP  18 20:00


     


 


 


  (Tylenol)  650 mg  Q6H  PRN


 PO  18 20:00


     


 


 


  (Morphine Inj)  2 mg  Q3H  PRN


 IV PUSH  18 20:00


     


 


 


  (Narcan Inj)  0.4 mg  UNSCH  PRN


 IV PUSH  18 20:00


     


 


 


  (Protonix)  40 mg  DAILY


 PO  18 09:00


     


 


 


 Sodium Chloride  1,000 ml @ 


 60 mls/hr  [id number]


 IV  18 21:00


    18 06:22


 


 


 Miscellaneous


 Information  Patient in


 critical care


 unit?  Ass...  [id number]


 .XX  18 21:00


    18 21:00


 


 


  (Chlorhexidine


 2% Cloth)  3 pack  DAILY@04


 TOPICAL  18 04:00


 18 04:01  18 04:00


 


 


  (Chlorhexidine


 2% Cloth)  3 pack  UNSCH  PRN


 TOPICAL  18 21:00


 18 20:59   


 


 


  (Lasix)  20 mg  DAILY


 PO  18 09:00


    18 09:08


 


 


 Sodium Chloride  250 ml @ 


 15 mls/hr  ONCE  ONCE


 IV  18 08:15


 18 00:54   


 


 


 Sodium Chloride  250 ml @ 


 15 mls/hr  ONCE  ONCE


 IV  18 09:30


 18 02:09   


 








Family History


Mother  in her 80s from complications of DM.  Father  of unknown 

causes.  Brother had alcoholism and suffered a stroke.  No other diseases are 

known to run in the family.


.


.


Substance Use


Tobacco:  Smoked in early youth, not as adult.


Alcohol:  No history of abuse


Prescription med abuse:  No history of abuse


Illicits:  No known use of illicits


Psychosocial History


Patient is originally from [place].  He spent most of his adult life in [place].  He 

moved to [place] in 2017.


The patient has a college education and is trained in computers.


He worked in [employer] with the [place] [employer].





The patient was  once.   He and his spouse have 3 sons and one daughter.

  One son lives with them here in [place] -- the other children live in [place].


The patient has 2 brothers.


.


Spiritual/Cultural Factors


Zoroastrian -- not active with any Evangelical.  Anglican played a more important 

role in his life when he was much younger.


.


Living Will:  Never completed


Health Care Surrogate:  Never completed


Durable Power of :  Never completed


Date completed:


Patient never completed an advance directive.


.


Health Care Surrogate(s):


Patient has verbally indicated that he would want his wife to serve as HCS.


.


Documented care wishes:


No written documentation of health care goals/preferences.


.


Today's verbally stated goals:


We discussed his goals after his wife and son arrived.  He agrees that given 

his current condition and prognosis, he would NOT want an attempted 

resuscitation.  He continues to desire aggressive care short of resuscitation 

for now to see if he can get the bleeding stopped to allow him to have more 

time at home.  He appears to be open to hospice care once he has completed 

procedures to address the bleeding.


.


.


Family/friends goals:


Wife and son are at bedside while we discuss goals of medical treatment.  They 

agree with NO CODE status.


.


Ethical and Legal Issues


Patient is capacitated to make his own health care decisions.   Should he 

become incapacitated , he has verbally designated his wife to be health care 

surrogate.


.


.





Physical Exam





Vital Signs








  Date Time  Temp Pulse Resp B/P (MAP) Pulse Ox O2 Delivery O2 Flow Rate FiO2


 


18 09:10 98.3 97 28 134/77 99   


 


18 08:00  93      


 


18 08:00 98.3 93 17 104/64 (77) 97   


 


18 06:00  94      


 


18 04:00  88 22 104/60 (75) 99   


 


18 04:00  88      


 


18 02:00  102      


 


18 00:00  99      


 


18 00:00 98.6 99 19 117/67 (84) 99   


 


18 22:00  103      


 


18 20:36     100 Nasal Cannula 5.00 


 


18 20:00 98.5 103 29 115/60 (78) 100   


 


18 20:00  103      


 


18 18:00  91      


 


18 16:00  94      


 


18 16:00  94 21 102/59 (73) 98   


 


18 14:00  97      


 


18 12:00 98.5 92 23 107/61 (76) 98   


 


18 12:00  92      





..











 18





 19:00 07:00


 


Intake Total 15 ml 


 


Balance 15 ml 


 


  


 


Blood Product IV Normal Saline Flush 15 ml 





..


Exam


CONSTITUTIONAL/GENERAL: This is an adequately nourished patient, awake, alert, 

conversant in and MICU bed .  No apparent distress.


TUBES/LINES/DRAINS:  Peripheral IV;  No wounds seen anteriorly. Skin 

temperature appropriate. Not diaphoretic. 


HEAD: Atraumatic. Normocephalic.


EYES: Pupils equal and round and reactive. Extraocular motions intact. No 

scleral icterus. No injection or drainage. Fundi not examined.


ENT: Hearing grossly normal. Nose without bleeding or purulent drainage. Throat 

without visible erythema, exudates, masses, or lesions.  Edentulous. 


NECK: Trachea midline. Supple, nontender. No palpable thyroid enlargement or 

nodularity. 


CARDIOVASCULAR: Regular rate and rhythm without murmurs, gallops, or rubs. No 

JVD. 


RESPIRATORY/CHEST: Symmetric, unlabored respirations. Clear to auscultation. 

Breath sounds equal bilaterally. No wheezes, rales, or rhonchi.  


GASTROINTESTINAL: Abdomen soft, non-tender, nondistended. No hepato-splenomegaly

, or palpable masses. No guarding. Bowel sounds present.


GENITOURINARY: Without palpable bladder distension. No penile/scrotal edema. 


MUSCULOSKELETAL: Extremities without clubbing, cyanosis.  No pedal edema, but 

SCDs in place.  No joint tenderness or effusion noted. No calf tenderness. No 

mottling.


LYMPHATICS: No palpable cervical or supraclavicular adenopathy.


NEUROLOGICAL: Awake and alert. Motor and sensory grossly within normal limits. 

Follows commands. Cognitively sharp. Moves all extremities.


PSYCHIATRIC: No obvious anxiety/depression. no apparent hallucinations or other 

psychotic thought process.


.





Diagnostic Tests


Laboratory





Laboratory Tests








Test


  18


18:07 18


20:50 18


00:30 18


05:58


 


White Blood Count


  12.6 TH/MM3


(4.0-11.0) 


  


  11.0 TH/MM3


(4.0-11.0)


 


Red Blood Count


  2.03 MIL/MM3


(4.50-5.90) 


  


  3.23 MIL/MM3


(4.50-5.90)


 


Hemoglobin


  5.5 GM/DL


(13.0-17.0) 


  


  8.9 GM/DL


(13.0-17.0)


 


Hematocrit


  17.4 %


(39.0-51.0) 


  


  26.8 %


(39.0-51.0)


 


Mean Corpuscular Volume


  85.8 FL


(80.0-100.0) 


  


  83.0 FL


(80.0-100.0)


 


Mean Corpuscular Hemoglobin


  27.0 PG


(27.0-34.0) 


  


  27.5 PG


(27.0-34.0)


 


Mean Corpuscular Hemoglobin


Concent 31.5 %


(32.0-36.0) 


  


  33.1 %


(32.0-36.0)


 


Red Cell Distribution Width


  21.4 %


(11.6-17.2) 


  


  20.2 %


(11.6-17.2)


 


Platelet Count


  390 TH/MM3


(150-450) 


  


  277 TH/MM3


(150-450)


 


Mean Platelet Volume


  9.6 FL


(7.0-11.0) 


  


  8.9 FL


(7.0-11.0)


 


Neutrophils (%) (Auto)


  91.6 %


(16.0-70.0) 


  


  86.5 %


(16.0-70.0)


 


Lymphocytes (%) (Auto)


  3.1 %


(9.0-44.0) 


  


  6.1 %


(9.0-44.0)


 


Monocytes (%) (Auto)


  5.0 %


(0.0-8.0) 


  


  7.3 %


(0.0-8.0)


 


Eosinophils (%) (Auto)


  0.0 %


(0.0-4.0) 


  


  0.0 %


(0.0-4.0)


 


Basophils (%) (Auto)


  0.3 %


(0.0-2.0) 


  


  0.1 %


(0.0-2.0)


 


Neutrophils # (Auto)


  11.6 TH/MM3


(1.8-7.7) 


  


  9.5 TH/MM3


(1.8-7.7)


 


Lymphocytes # (Auto)


  0.4 TH/MM3


(1.0-4.8) 


  


  0.7 TH/MM3


(1.0-4.8)


 


Monocytes # (Auto)


  0.6 TH/MM3


(0-0.9) 


  


  0.8 TH/MM3


(0-0.9)


 


Eosinophils # (Auto)


  0.0 TH/MM3


(0-0.4) 


  


  0.0 TH/MM3


(0-0.4)


 


Basophils # (Auto)


  0.0 TH/MM3


(0-0.2) 


  


  0.0 TH/MM3


(0-0.2)


 


CBC Comment DIFF FINAL    DIFF FINAL 


 


Differential Comment      


 


Prothrombin Time


  11.4 SEC


(9.8-11.6) 


  


  


 


 


Prothromb Time International


Ratio 1.1 RATIO 


  


  


  


 


 


Activated Partial


Thromboplast Time 22.9 SEC


(24.3-30.1) 


  


  


 


 


Blood Urea Nitrogen


  36 MG/DL


(7-18) 


  


  39 MG/DL


(7-18)


 


Creatinine


  2.07 MG/DL


(0.60-1.30) 


  


  2.03 MG/DL


(0.60-1.30)


 


Random Glucose


  160 MG/DL


() 


  


  138 MG/DL


()


 


Total Protein


  6.8 GM/DL


(6.4-8.2) 


  


  6.5 GM/DL


(6.4-8.2)


 


Albumin


  3.8 GM/DL


(3.4-5.0) 


  


  3.8 GM/DL


(3.4-5.0)


 


Calcium Level


  9.1 MG/DL


(8.5-10.1) 


  


  8.9 MG/DL


(8.5-10.1)


 


Alkaline Phosphatase


  72 U/L


() 


  


  70 U/L


()


 


Aspartate Amino Transf


(AST/SGOT) 32 U/L (15-37) 


  


  


  138 U/L


(15-37)


 


Alanine Aminotransferase


(ALT/SGPT) 30 U/L (12-78) 


  


  


  35 U/L (12-78) 


 


 


Total Bilirubin


  0.6 MG/DL


(0.2-1.0) 


  


  1.1 MG/DL


(0.2-1.0)


 


Sodium Level


  131 MEQ/L


(136-145) 


  


  135 MEQ/L


(136-145)


 


Potassium Level


  5.1 MEQ/L


(3.5-5.1) 


  


  5.1 MEQ/L


(3.5-5.1)


 


Chloride Level


  99 MEQ/L


() 


  


  100 MEQ/L


()


 


Carbon Dioxide Level


  15.3 MEQ/L


(21.0-32.0) 


  


  22.7 MEQ/L


(21.0-32.0)


 


Anion Gap


  17 MEQ/L


(5-15) 


  


  12 MEQ/L


(5-15)


 


Estimat Glomerular Filtration


Rate 31 ML/MIN


(>89) 


  


  32 ML/MIN


(>89)


 


Troponin I


  2.45 NG/ML


(0.02-0.05) 


  19.30 NG/ML


(0.02-0.05) GREATER THAN


40.00 NG/ML


 


Lipase


  316 U/L


() 


  


  


 


 


Nasal Screen MRSA (PCR)


  


  MRSA NOT


DETECTED (NOT 


  


 


 


Total Creatine Kinase


  


  


  337 U/L


() 642 U/L


()


 


Creatine Kinase MB


  


  


  23.2 NG/ML


(0.5-3.6) 53.9 NG/ML


(0.5-3.6)


 


Creatine Kinase MB %


  


  


  6.9 %


(0.0-4.0) 8.4 %


(0.0-4.0)


 


Test


  18


13:45 18


04:15 


  


 


 


Hemoglobin


  8.5 GM/DL


(13.0-17.0) 7.3 GM/DL


(13.0-17.0) 


  


 


 


Hematocrit


  25.2 %


(39.0-51.0) 21.8 %


(39.0-51.0) 


  


 


 


White Blood Count


  


  8.9 TH/MM3


(4.0-11.0) 


  


 


 


Red Blood Count


  


  2.62 MIL/MM3


(4.50-5.90) 


  


 


 


Mean Corpuscular Volume


  


  82.9 FL


(80.0-100.0) 


  


 


 


Mean Corpuscular Hemoglobin


  


  27.7 PG


(27.0-34.0) 


  


 


 


Mean Corpuscular Hemoglobin


Concent 


  33.4 %


(32.0-36.0) 


  


 


 


Red Cell Distribution Width


  


  19.9 %


(11.6-17.2) 


  


 


 


Platelet Count


  


  207 TH/MM3


(150-450) 


  


 


 


Mean Platelet Volume


  


  8.8 FL


(7.0-11.0) 


  


 


 


Neutrophils (%) (Auto)


  


  86.8 %


(16.0-70.0) 


  


 


 


Lymphocytes (%) (Auto)


  


  4.2 %


(9.0-44.0) 


  


 


 


Monocytes (%) (Auto)


  


  8.9 %


(0.0-8.0) 


  


 


 


Eosinophils (%) (Auto)


  


  0.1 %


(0.0-4.0) 


  


 


 


Basophils (%) (Auto)


  


  0.0 %


(0.0-2.0) 


  


 


 


Neutrophils # (Auto)


  


  7.7 TH/MM3


(1.8-7.7) 


  


 


 


Lymphocytes # (Auto)


  


  0.4 TH/MM3


(1.0-4.8) 


  


 


 


Monocytes # (Auto)


  


  0.8 TH/MM3


(0-0.9) 


  


 


 


Eosinophils # (Auto)


  


  0.0 TH/MM3


(0-0.4) 


  


 


 


Basophils # (Auto)


  


  0.0 TH/MM3


(0-0.2) 


  


 


 


CBC Comment  AUTO DIFF   


 


Differential Comment


  


  AUTO DIFF


CONFIRMED 


  


 


 


Blood Urea Nitrogen


  


  38 MG/DL


(7-18) 


  


 


 


Creatinine


  


  1.86 MG/DL


(0.60-1.30) 


  


 


 


Random Glucose


  


  126 MG/DL


() 


  


 


 


Total Protein


  


  5.5 GM/DL


(6.4-8.2) 


  


 


 


Albumin


  


  3.0 GM/DL


(3.4-5.0) 


  


 


 


Calcium Level


  


  7.6 MG/DL


(8.5-10.1) 


  


 


 


Phosphorus Level


  


  3.9 MG/DL


(2.5-4.9) 


  


 


 


Magnesium Level


  


  2.1 MG/DL


(1.5-2.5) 


  


 


 


Alkaline Phosphatase


  


  60 U/L


() 


  


 


 


Aspartate Amino Transf


(AST/SGOT) 


  85 U/L (15-37) 


  


  


 


 


Alanine Aminotransferase


(ALT/SGPT) 


  28 U/L (12-78) 


  


  


 


 


Total Bilirubin


  


  0.8 MG/DL


(0.2-1.0) 


  


 


 


Sodium Level


  


  139 MEQ/L


(136-145) 


  


 


 


Potassium Level


  


  4.1 MEQ/L


(3.5-5.1) 


  


 


 


Chloride Level


  


  107 MEQ/L


() 


  


 


 


Carbon Dioxide Level


  


  23.0 MEQ/L


(21.0-32.0) 


  


 


 


Anion Gap  9 MEQ/L (5-15)   


 


Estimat Glomerular Filtration


Rate 


  35 ML/MIN


(>89) 


  


 





.


Result Diagram:  


18 0415                                                                   

             18 0415





Imaging





Last Impressions








Abdomen/Pelvis CT 18 194 Signed





Impressions: 





 Service Date/Time:  2018 20:13 - CONCLUSION:  1. No acute 





 finding is identified to explain the clinical symptoms. 2. Nonacute findings 





 include cholelithiasis, severe atherosclerotic disease, and prostatomegaly.   

  





 Adi Niño MD 


 


Chest X-Ray [id number] Signed





Impressions: 





 Service Date/Time:  2018 17:50 - CONCLUSION:  Underinflation 





 with atelectasis at the lung bases. Otherwise, no acute cardiopulmonary 





 abnormality is identified.     Adi Niño MD 





.





Patient/Family Conference


Present at Family Conference:


I spoke with patient alone for about 40 minutes in his room.


The patient's wife and son arrived about an hour later and I spoke with them 

AND the patient at bedside for another 20 minutes. 


.


Family Conference Time (mins):  60


Family Conference Location:  Bedside


Issues Discussed:


* Palliative care role, purpose, approach


* Additional medical, psychosocial, and spiritual history


* Patients general health, functional status, and cognitive changes in the 

months leading up to the current hospitalization


* Patient/family understanding of the current medical problems


* Patient/family understanding of prognosis


* Patients goals of medical treatment.


* Current medical treatment options and benefits/burdens of those options


* Likely scenarios comparing ongoing aggressive care with a transition to 

comfort measures only


* Questions answered to the best of my ability


* Palliative care contact information provided





Approximately 20 minutes was devoted to Advance Care Planning which the patient 

and family voluntarily participated in.  We discussed his prognosis .  We 

discussed the importance of designating a health care surrogate.  We discussed 

the importance of sharing health care goals and preferences with the surrogate 

and other family members.  We discussed code status and the benefits/burdens of 

resuscitation attempts, the likelihood of success of resuscitation, and quality 

of life issue post resuscitation.  We discussed hospice as an option.


.





Assessment and Plan


Disease Oriented Problem List:  


(1) NSTEMI (non-ST elevated myocardial infarction)


(2) Aortic stenosis


Comment:  Severe





(3) Ischemic cardiomyopathy


(4) JOSIE (acute kidney injury)


(5) GI bleed


Comment:  Unkown location.





(6) Severe anemia


(7) Diabetes mellitus


Symptom Scale:  


(1) Pain


0-10 Scale:  Unable to quantify


Comment:  Pain:  Patient reports intermittent pressure-type chest pain that 

does not radiate to neck or arms.  It does not appear to be worse with exertion 

but is more likely to occur when he is anemic.  It seem to improve after he 

passes gas. 


* 





(2) Generalized weakness


0-10 Scale:  Unable to quantify


Comment:  Generalized weakness:  the generalized weakness appears to be most 

intense when hemoglobin is low and improves with transfusion. 


.





(3) Nausea


0-10 Scale:  Unable to quantify


Comment:  Was severe in days leading up to this admission.  Now mostly 

resolved. 


.





(4) Constipation


0-10 Scale:  Unable to quantify


Comment:  This is perrenial problem for the patient. 


.





Pertinent Non-Medical Issues


Psychosocial:  Well supported locally by wife and son with whom he lives.  

Other children in [place]


Spiritual:  [employer] background.  Not a member of a local parish. 


Legal:  No advance directives.  


Ethical issues impacting care:  Patient is capacitated to make his own health 

care decisions.


.


Important Contacts


* Shahnaz Cho  (spouse and verbally designated health care surrogate)  [phone]


* Amor Cho (son that lives with patient)   [phone]


.


Prognosis


Patient has severe aortic stenosis (and just had a non-STEMI) and per patient, 

the cardiologist has explained he will likely not live a year without a new 

valve.  However, he is not felt to be a reasonable surgical candidate for 

either open heart or TAVR valve surgery.  Given his bleeding, he will likely 

have to remain off anti-coagulants.  In the meantime, patient has a recurrent 

GI bleed from an unknown source.   Unless the bleeding stops on its own or can 

be stopped by a low risk procedure, bleeding will likely continue.  The 

combination of his cardiac conditions and GI bleeding make life expectancy 

likely 6 months or less. 





Patient is eligible for hospice care at such time that goals are primarily 

comfort oriented.


.


Code Status:  No Code


Plan


==  Code Status:  Patient has opted for NO CODE status. This decision was made 

with his wife and son at the bedside and participating in the discussion on .





==  Decision Making:  Patient is capacitated to make his own health care 

decisions.  He has verbally stated that he wants his spouse to serve as health 

care surrogate should he become incapacitated. 





==  Goals of medical treatment:  Patient want DNR status but desires aggressive 

care short of resuscitation at this time. He is hoping that he can find a way 

to have the bleeding stop.  Once bleeding is stopped he will consider hospice 

care at home if there is no way to improve his cardiac conditions. 





==  Symptoms:


* Pain:  Patient reports intermittent pressure-type chest pain that does not 

radiate to neck or arms.  It does not appear to be worse with exertion but is 

more likely to occur when he is anemic.  It seem to improve after he passes 

gas. 


* Generalized weakness:  the generalized weakness appears to be most intense 

when hemoglobin is low and improves with transfusion. 


* Dyspnea:  Gets some dyspnea with exertion at home but is not on 02.  Dyspnea 

managed in hospital with nasal cannula 02.  


* Nausea:  Improved .


* Constipation:  Chronic problem for patient.





==  Other:


* Case discussed by phone with [facility].  We spoke about ways of working up the 

GI bleed.  He has ordered a bleeding scan.  If bleeding scan is not helpful, GI 

will consider repeat endoscopy. 


* If a bleeding site is located, the question is whether or not it can be 

treated (cured) without an invasive procedure that will put patient at risk 

given his other medical problems. 


* Once the work up to find the bleeding site is completed and easily treatable 

causes are addressed, we will recommend hospice.





==  Plan:


* Do not recommend change in parenteral opioid order at this time. 








==  Palliative care will continue to follow patient to assist with symptom 

management and to further clarify goals of medical treatment as the clinical 

course evolves.





Thank you for the opportunity to participate in the care of Mr. Cho.





Attestation


To help prompt me to consider important information that might be impacting 

today's encounter and assessment, information from prior notes written by 

myself or my colleagues may have been "brought forward" into today's note.  My 

signature on this note, however, is an attestation that I personally performed 

the exam, history, and/or decision-making noted today, and, unless otherwise 

indicated, the interactions with patient, family, and staff as well as the 

review of records all occurred today.  I also attest that the listed assessment 

and stated plan reflect my best clinical judgment today based on the 

combination of historical information, prior notes, and today's exam/ 

interactions.  When time spent is documented, it refers only to time spent 

today by the signer, or if indicated, combined time spent today by 

collaborating physician/nurse practitioner.. 


.


.











Charan Gallego MD 2018 10:46

## 2018-04-24 NOTE — EKG
Date Performed: 04/23/2018       Time Performed: 00:05:36

 

PTAGE:      77 years

 

EKG:      Sinus tachycardia Possible LVH with secondary repolarization abnormality Extensive ST-T thony
nges may be due to hypertrophy and/or ischemia Abnormal ECG

 

PREVIOUS TRACING       : 04/22/2018 17.22 Compared to previous tracing,  ST/T wave changes more promi
nent

 

DOCTOR:   Paul Bo  Interpretating Date/Time  04/24/2018 00:14:06

## 2018-04-24 NOTE — RADRPT
EXAM DATE/TIME:  2018 14:42 

 

[facility] COMPARISON:     

CT ABDOMEN & PELVIS W/O CONTRAST, 2018, 20:13.

 

 

INDICATIONS :      

Rectal bleeding.

                       

 

DOSE:      

20.7 mCi Tc99m Ultratag labeled red blood cells IV 

                       

 

IMAGIN hrs 

                       

 

MEDICAL HISTORY :     

Congestive hearrt failure. Diabetes mellitus type 2. Renal failure, chronic. Hypertension, myocardial
 infarction and aortic stenosis.

 

SURGICAL HISTORY :      

Coronary artery stent.    

 

ENCOUNTER:     

Initial

 

ACUITY:     

1 day

 

PAIN SCALE:     

0/10

 

LOCATION:        

Abdomen.

 

TECHNIQUE:     

Following the modified in vitro labeling of autologous red cells, dynamic continuous images were acqu
ired for the specified interval.

 

FINDINGS:     

 

BIODISTRIBUTION:     

There is a very good labeling of red cells without significant uptake in the gastric wall.  There is 
good delineation of the blood pool of the spleen and abdominal vessels.

 

BLEEDING:     

No episodes of active GI bleeding are observed during specified interval of continuous observation. T
he elongated area of increased radioactivity projecting below the level of the pelvis presumably repr
esents a High catheter

 

CONCLUSION:     No findings of active GI bleed.

 

 

 

 Gal Mayfield MD on 2018 at 16:54           

Board Certified Radiologist.

 This report was verified electronically.

## 2018-04-24 NOTE — HHI.GIFU
Subjective


Remarks


Pt resting in bed.  insists that he must be bleeding in his rectum but denies 

any rectal bleeding.  Had chest pain this morning, says it is better now.


 (Rupal Bauer)





Objective


Vitals I&O





Vital Signs








  Date Time  Temp Pulse Resp B/P (MAP) Pulse Ox O2 Delivery O2 Flow Rate FiO2


 


4/24/18 10:21     100 Nasal Cannula 2.00 


 


4/24/18 09:10 98.3 97 28 134/77 99   


 


4/24/18 08:00  93      


 


4/24/18 08:00 98.3 93 17 104/64 (77) 97   


 


4/24/18 06:00  94      


 


4/24/18 04:00  88 22 104/60 (75) 99   


 


4/24/18 04:00  88      


 


4/24/18 02:00  102      


 


4/24/18 00:00  99      


 


4/24/18 00:00 98.6 99 19 117/67 (84) 99   


 


4/23/18 22:00  103      


 


4/23/18 20:36     100 Nasal Cannula 5.00 


 


4/23/18 20:00 98.5 103 29 115/60 (78) 100   


 


4/23/18 20:00  103      


 


4/23/18 18:00  91      


 


4/23/18 16:00  94      


 


4/23/18 16:00  94 21 102/59 (73) 98   


 


4/23/18 14:00  97      


 


4/23/18 12:00 98.5 92 23 107/61 (76) 98   


 


4/23/18 12:00  92      














I/O      


 


 4/23/18 4/23/18 4/23/18 4/24/18 4/24/18 4/24/18





 07:00 15:00 23:00 07:00 15:00 23:00


 


Intake Total 1230 ml  240 ml 60 ml 15 ml 


 


Output Total   450 ml 350 ml  


 


Balance 1230 ml  -210 ml -290 ml 15 ml 


 


      


 


Intake Oral   240 ml 60 ml  


 


IV Total 100 ml     


 


Packed Cells 1035 ml     


 


Blood Product IV Normal Saline Flush 95 ml    15 ml 


 


Output Urine Total   450 ml 350 ml  








Laboratory





Laboratory Tests








Test


  4/23/18


13:45 4/24/18


04:15


 


Hemoglobin 8.5  7.3 


 


Hematocrit 25.2  21.8 


 


White Blood Count  8.9 


 


Red Blood Count  2.62 


 


Mean Corpuscular Volume  82.9 


 


Mean Corpuscular Hemoglobin  27.7 


 


Mean Corpuscular Hemoglobin


Concent 


  33.4 


 


 


Red Cell Distribution Width  19.9 


 


Platelet Count  207 


 


Mean Platelet Volume  8.8 


 


Neutrophils (%) (Auto)  86.8 


 


Lymphocytes (%) (Auto)  4.2 


 


Monocytes (%) (Auto)  8.9 


 


Eosinophils (%) (Auto)  0.1 


 


Basophils (%) (Auto)  0.0 


 


Neutrophils # (Auto)  7.7 


 


Lymphocytes # (Auto)  0.4 


 


Monocytes # (Auto)  0.8 


 


Eosinophils # (Auto)  0.0 


 


Basophils # (Auto)  0.0 


 


CBC Comment  AUTO DIFF 


 


Differential Comment


  


  AUTO DIFF


CONFIRMED


 


Blood Urea Nitrogen  38 


 


Creatinine  1.86 


 


Random Glucose  126 


 


Total Protein  5.5 


 


Albumin  3.0 


 


Calcium Level  7.6 


 


Phosphorus Level  3.9 


 


Magnesium Level  2.1 


 


Alkaline Phosphatase  60 


 


Aspartate Amino Transf


(AST/SGOT) 


  85 


 


 


Alanine Aminotransferase


(ALT/SGPT) 


  28 


 


 


Total Bilirubin  0.8 


 


Sodium Level  139 


 


Potassium Level  4.1 


 


Chloride Level  107 


 


Carbon Dioxide Level  23.0 


 


Anion Gap  9 


 


Estimat Glomerular Filtration


Rate 


  35 


 








Imaging





Last Impressions








Abdomen/Pelvis CT 4/22/18 1947 Signed





Impressions: 





 Service Date/Time:  Sunday, April 22, 2018 20:13 - CONCLUSION:  1. No acute 





 finding is identified to explain the clinical symptoms. 2. Nonacute findings 





 include cholelithiasis, severe atherosclerotic disease, and prostatomegaly.   

  





 Adi Niño MD 


 


Chest X-Ray 4/22/18 0574 Signed





Impressions: 





 Service Date/Time:  Sunday, April 22, 2018 17:50 - CONCLUSION:  Underinflation 





 with atelectasis at the lung bases. Otherwise, no acute cardiopulmonary 





 abnormality is identified.     Adi Niño MD 








Physical Exam


HEENT: PERRL; normocephalic; atraumatic; no jaundice.  


CHEST:  CTA


CARDIAC:  RRR + murmur


ABDOMEN:  Soft, nondistended, nontender; no hepatosplenomegaly; bowel sounds 

are present in all four quadrants.


EXTREMITIES: No clubbing, cyanosis, or edema.


SKIN:  Normal; no rash; no jaundice.


CNS:  No focal deficits; alert and oriented times three.


 (Rupal Bauer)





Assessment and Plan


Plan


ASSESSMENT


- anemia, black tarry stool - poss UGIB location unclear.  had EGD and 

colonoscopy 2/24/18 no bleeding seen


   scheduled for outpt CE today.  ASA and plavix held.  per cardiology he is 

high risk for procedure but its "not


   totally unreasonable."   CT showed no acute findings


-NSTEMI - cardiology following.  needs TAVR but not good candidate and would 

need anemia resolved





4/24/18 after episode of chest pain this  am, cardiology has declared pt "ultra 

high risk" for procedures and that may be 


 "reasonable" to proceed with endoscopy if he is stable and pain free.  will 

hold for now.  HH continues to trend down despite


  transfusions.  NO obvious bleeding, pt denies rectal bleeding.  palliative 

care consulted.





PLAN


- await palliative care consult


- EGD when stable/pain free per cardiology, still is high risk


- monitor labs


- transfuse as needed


- notify GI of active bleeding


 





pt seen by myself and Dr Cedeño and this note is on his behalf


 (Rupal Bauer)


Physician Comments


Patient seen and examined


Agree with above


Continue current supportive care


Monitor labs


Appears to have widening MI with increasing troponin


Also appears to be having a GI bleed although source is not obvious but there 

is a decline in hemoglobin


This is a very tough situation and the patient is high risk and unstable for 

endoscopy


Case was discussed with attending physician Dr. Derek Murphy and Dr. Gallego 

from palliative care


At this point our best approach would be to obtain a bleeding scan so as to try 

and localize bleeding source


In the meanwhile continue with current supportive measures with blood 

transfusions and PPI


Prognosis appears to be poor at this point


 (Sim Cedeño MD)











Rupal Bauer Apr 24, 2018 11:05


Sim Cedeño MD Apr 24, 2018 15:06

## 2018-04-24 NOTE — HHI.PR
Subjective


Remarks


Follow up NSTEMI, anemia, GI bleed. Patient still having episodes of chest pain 

overnight and this morning. No dyspnea. No bowel movements today.





Objective


Vitals





Vital Signs








  Date Time  Temp Pulse Resp B/P (MAP) Pulse Ox O2 Delivery O2 Flow Rate FiO2


 


4/24/18 09:10 98.3 97 28 134/77 99   


 


4/24/18 08:00  93      


 


4/24/18 08:00 98.3 93 17 104/64 (77) 97   


 


4/24/18 06:00  94      


 


4/24/18 04:00  88 22 104/60 (75) 99   


 


4/24/18 04:00  88      


 


4/24/18 02:00  102      


 


4/24/18 00:00  99      


 


4/24/18 00:00 98.6 99 19 117/67 (84) 99   


 


4/23/18 22:00  103      


 


4/23/18 20:36     100 Nasal Cannula 5.00 


 


4/23/18 20:00 98.5 103 29 115/60 (78) 100   


 


4/23/18 20:00  103      


 


4/23/18 18:00  91      


 


4/23/18 16:00  94      


 


4/23/18 16:00  94 21 102/59 (73) 98   


 


4/23/18 14:00  97      


 


4/23/18 12:00 98.5 92 23 107/61 (76) 98   


 


4/23/18 12:00  92      


 


4/23/18 10:00  85      














I/O      


 


 4/23/18 4/23/18 4/23/18 4/24/18 4/24/18 4/24/18





 07:00 15:00 23:00 07:00 15:00 23:00


 


Intake Total 1230 ml  240 ml 60 ml 15 ml 


 


Output Total   450 ml 350 ml  


 


Balance 1230 ml  -210 ml -290 ml 15 ml 


 


      


 


Intake Oral   240 ml 60 ml  


 


IV Total 100 ml     


 


Packed Cells 1035 ml     


 


Blood Product IV Normal Saline Flush 95 ml    15 ml 


 


Output Urine Total   450 ml 350 ml  








Result Diagram:  


4/24/18 0415                                                                   

             4/24/18 0415





Imaging





Last Impressions








Abdomen/Pelvis CT 4/22/18 1947 Signed





Impressions: 





 Service Date/Time:  Sunday, April 22, 2018 20:13 - CONCLUSION:  1. No acute 





 finding is identified to explain the clinical symptoms. 2. Nonacute findings 





 include cholelithiasis, severe atherosclerotic disease, and prostatomegaly.   

  





 Adi Niño MD 


 


Chest X-Ray 4/22/18 2762 Signed





Impressions: 





 Service Date/Time:  Sunday, April 22, 2018 17:50 - CONCLUSION:  Underinflation 





 with atelectasis at the lung bases. Otherwise, no acute cardiopulmonary 





 abnormality is identified.     Adi Niño MD 








Objective Remarks


General: No acute distress.


Heart: Regular rate and rhythm.  2/6 systolic murmur.


Lungs: Clear to auscultation bilaterally. No wheezes, rales, or rhonchi. 

Breathing is nonlabored.


Abdomen: Soft, nontender, nondistended.


Extremities: No lower extremity edema.  SCDs.


Psych: Alert and oriented.


Neuro: Normal speech.  No focal deficits noted.


Procedures


None


Urinary Catheter:  No


Vascular Central Line Catheter:  No





A/P


Assessment and Plan


1.  GI bleed, symptomatic anemia: H&H decreased again this morning.  Transfuse 

2 units PRBCs.  Monitor H&H closely.  Continue IV Protonix drip.  Appreciate GI 

recommendations.  Patient was scheduled for capsule endoscopy as an outpatient 

today.  N.p.o. for procedure, however procedure has been cancelled as the 

patient is too unstable at this time.


2.  Non-ST elevation MI: Likely secondary to severe anemia.  Patient has known 

history of coronary artery disease.  Status post cardiac catheterization on 4/1/ 18, which showed severe aortic stenosis with total occlusion of nondominant 

right coronary artery and 50% stenosis of the distal circumflex.  Medical 

management was recommended at that time.  Appreciate cardiology 

recommendations.  Aspirin, Plavix on hold secondary to GI bleed.  No 

anticoagulation at this time secondary to bleeding.  Beta-blocker on hold.


3.  Hypertension: Lisinopril, Coreg on hold.


4.  Hyperlipidemia: Not on medications.


5.  Diabetes mellitus: Monitor Accu-Cheks and cover with sliding scale insulin.


6.  Acute kidney injury superimposed on chronic kidney disease stage III: 

Gentle IV fluid hydration.  Monitor BUN and creatinine.


7.  DVT prophylaxis: SCDs.  Chemical prophylaxis on hold secondary to GI bleed.


8.  Severe aortic stenosis: Not a candidate for surgery at this time.


9.  Consult palliative care.  Patient's prognosis is guarded.  





Discussed with Dr. Mason, Dr. Cedeño, and the patient's PCP Dr. Olivier Aquino.











Kendall Fitzpatrick MD Apr 24, 2018 10:00

## 2018-04-25 VITALS — HEART RATE: 79 BPM

## 2018-04-25 VITALS
DIASTOLIC BLOOD PRESSURE: 64 MMHG | OXYGEN SATURATION: 100 % | RESPIRATION RATE: 26 BRPM | SYSTOLIC BLOOD PRESSURE: 112 MMHG | HEART RATE: 89 BPM

## 2018-04-25 VITALS
DIASTOLIC BLOOD PRESSURE: 66 MMHG | HEART RATE: 87 BPM | RESPIRATION RATE: 12 BRPM | SYSTOLIC BLOOD PRESSURE: 111 MMHG | OXYGEN SATURATION: 100 %

## 2018-04-25 VITALS
RESPIRATION RATE: 17 BRPM | OXYGEN SATURATION: 100 % | SYSTOLIC BLOOD PRESSURE: 117 MMHG | HEART RATE: 84 BPM | DIASTOLIC BLOOD PRESSURE: 66 MMHG | TEMPERATURE: 97.9 F

## 2018-04-25 VITALS
DIASTOLIC BLOOD PRESSURE: 69 MMHG | SYSTOLIC BLOOD PRESSURE: 110 MMHG | RESPIRATION RATE: 25 BRPM | HEART RATE: 80 BPM | TEMPERATURE: 98 F | OXYGEN SATURATION: 100 %

## 2018-04-25 VITALS
RESPIRATION RATE: 25 BRPM | DIASTOLIC BLOOD PRESSURE: 85 MMHG | SYSTOLIC BLOOD PRESSURE: 100 MMHG | OXYGEN SATURATION: 98 % | TEMPERATURE: 98.2 F | HEART RATE: 101 BPM

## 2018-04-25 VITALS — OXYGEN SATURATION: 100 %

## 2018-04-25 VITALS
OXYGEN SATURATION: 100 % | TEMPERATURE: 98.1 F | SYSTOLIC BLOOD PRESSURE: 115 MMHG | HEART RATE: 107 BPM | DIASTOLIC BLOOD PRESSURE: 73 MMHG | RESPIRATION RATE: 18 BRPM

## 2018-04-25 VITALS
RESPIRATION RATE: 19 BRPM | HEART RATE: 81 BPM | TEMPERATURE: 98.6 F | DIASTOLIC BLOOD PRESSURE: 66 MMHG | SYSTOLIC BLOOD PRESSURE: 112 MMHG | OXYGEN SATURATION: 99 %

## 2018-04-25 VITALS — HEART RATE: 97 BPM

## 2018-04-25 VITALS — HEART RATE: 100 BPM

## 2018-04-25 VITALS
OXYGEN SATURATION: 98 % | HEART RATE: 87 BPM | SYSTOLIC BLOOD PRESSURE: 104 MMHG | RESPIRATION RATE: 44 BRPM | DIASTOLIC BLOOD PRESSURE: 69 MMHG

## 2018-04-25 VITALS — OXYGEN SATURATION: 98 %

## 2018-04-25 VITALS — HEART RATE: 84 BPM

## 2018-04-25 LAB
BASOPHILS # BLD AUTO: 0 TH/MM3 (ref 0–0.2)
BASOPHILS NFR BLD: 0.3 % (ref 0–2)
BUN SERPL-MCNC: 32 MG/DL (ref 7–18)
CALCIUM SERPL-MCNC: 8 MG/DL (ref 8.5–10.1)
CHLORIDE SERPL-SCNC: 106 MEQ/L (ref 98–107)
CREAT SERPL-MCNC: 1.78 MG/DL (ref 0.6–1.3)
EOSINOPHIL # BLD: 0.1 TH/MM3 (ref 0–0.4)
EOSINOPHIL NFR BLD: 1.6 % (ref 0–4)
ERYTHROCYTE [DISTWIDTH] IN BLOOD BY AUTOMATED COUNT: 18.7 % (ref 11.6–17.2)
GFR SERPLBLD BASED ON 1.73 SQ M-ARVRAT: 37 ML/MIN (ref 89–?)
GLUCOSE SERPL-MCNC: 103 MG/DL (ref 74–106)
HCO3 BLD-SCNC: 25.4 MEQ/L (ref 21–32)
HCT VFR BLD CALC: 30.3 % (ref 39–51)
HGB BLD-MCNC: 9.9 GM/DL (ref 13–17)
LYMPHOCYTES # BLD AUTO: 0.7 TH/MM3 (ref 1–4.8)
LYMPHOCYTES NFR BLD AUTO: 8.2 % (ref 9–44)
MCH RBC QN AUTO: 27.8 PG (ref 27–34)
MCHC RBC AUTO-ENTMCNC: 32.9 % (ref 32–36)
MCV RBC AUTO: 84.6 FL (ref 80–100)
MONOCYTE #: 0.7 TH/MM3 (ref 0–0.9)
MONOCYTES NFR BLD: 8.2 % (ref 0–8)
NEUTROPHILS # BLD AUTO: 7 TH/MM3 (ref 1.8–7.7)
NEUTROPHILS NFR BLD AUTO: 81.7 % (ref 16–70)
PLATELET # BLD: 227 TH/MM3 (ref 150–450)
PMV BLD AUTO: 8.5 FL (ref 7–11)
RBC # BLD AUTO: 3.58 MIL/MM3 (ref 4.5–5.9)
SODIUM SERPL-SCNC: 139 MEQ/L (ref 136–145)
WBC # BLD AUTO: 8.6 TH/MM3 (ref 4–11)

## 2018-04-25 RX ADMIN — IPRATROPIUM BROMIDE AND ALBUTEROL SULFATE SCH AMPULE: .5; 3 SOLUTION RESPIRATORY (INHALATION) at 21:40

## 2018-04-25 RX ADMIN — IPRATROPIUM BROMIDE AND ALBUTEROL SULFATE SCH AMPULE: .5; 3 SOLUTION RESPIRATORY (INHALATION) at 03:54

## 2018-04-25 RX ADMIN — PHENYTOIN SODIUM SCH MLS/HR: 50 INJECTION INTRAMUSCULAR; INTRAVENOUS at 12:47

## 2018-04-25 RX ADMIN — Medication SCH ML: at 09:50

## 2018-04-25 RX ADMIN — Medication SCH ML: at 20:39

## 2018-04-25 RX ADMIN — IPRATROPIUM BROMIDE AND ALBUTEROL SULFATE SCH AMPULE: .5; 3 SOLUTION RESPIRATORY (INHALATION) at 07:49

## 2018-04-25 RX ADMIN — INSULIN ASPART SCH: 100 INJECTION, SOLUTION INTRAVENOUS; SUBCUTANEOUS at 20:39

## 2018-04-25 RX ADMIN — PANTOPRAZOLE SCH MG: 40 TABLET, DELAYED RELEASE ORAL at 09:49

## 2018-04-25 RX ADMIN — INSULIN ASPART SCH: 100 INJECTION, SOLUTION INTRAVENOUS; SUBCUTANEOUS at 08:00

## 2018-04-25 RX ADMIN — IPRATROPIUM BROMIDE AND ALBUTEROL SULFATE SCH AMPULE: .5; 3 SOLUTION RESPIRATORY (INHALATION) at 16:25

## 2018-04-25 RX ADMIN — FUROSEMIDE SCH MG: 20 TABLET ORAL at 09:49

## 2018-04-25 RX ADMIN — PANTOPRAZOLE SODIUM SCH MLS/HR: 40 INJECTION, POWDER, FOR SOLUTION INTRAVENOUS at 08:27

## 2018-04-25 RX ADMIN — INSULIN ASPART SCH: 100 INJECTION, SOLUTION INTRAVENOUS; SUBCUTANEOUS at 12:00

## 2018-04-25 RX ADMIN — CHLORHEXIDINE GLUCONATE SCH PACK: 500 CLOTH TOPICAL at 04:00

## 2018-04-25 RX ADMIN — INSULIN ASPART SCH: 100 INJECTION, SOLUTION INTRAVENOUS; SUBCUTANEOUS at 17:00

## 2018-04-25 NOTE — PD.CARD.PN
Subjective


Subjective Remarks


Pt c/o intermittent right groin pain





Objective


Medications





Current Medications








 Medications


  (Trade)  Dose


 Ordered  Sig/Ramu


 Route  Start Time


 Stop Time Status Last Admin


 


  (NS Flush)  2 ml  UNSCH  PRN


 IV FLUSH  4/22/18 20:00


     


 


 


  (NS Flush)  2 ml  BID


 IV FLUSH  4/22/18 21:00


    4/25/18 09:50


 


 


  (D50w (Vial) Inj)  50 ml  UNSCH  PRN


 IV PUSH  4/22/18 20:00


     


 


 


  (Glucagon Inj)  1 mg  UNSCH  PRN


 OTHER  4/22/18 20:00


     


 


 


  (NovoLOG


 SUPPLEMENTAL


 SCALE)  1  ACHS SLIDING  SCALE


 SQ  4/22/18 21:00


     


 


 


  (Duoneb Neb)  1 ampule  Q4HR NEB  PRN


 NEB  4/22/18 20:00


     


 


 


  (Duoneb Neb)  1 ampule  Q6HR  NEB


 NEB  4/22/18 22:00


    4/25/18 16:25


 


 


  (Zofran Inj)  4 mg  Q6H  PRN


 IVP  4/22/18 20:00


     


 


 


  (Tylenol)  650 mg  Q6H  PRN


 PO  4/22/18 20:00


     


 


 


  (Morphine Inj)  2 mg  Q3H  PRN


 IV PUSH  4/22/18 20:00


     


 


 


  (Narcan Inj)  0.4 mg  UNSCH  PRN


 IV PUSH  4/22/18 20:00


     


 


 


  (Protonix)  40 mg  DAILY


 PO  4/23/18 09:00


    4/25/18 09:49


 


 


 Sodium Chloride  1,000 ml @ 


 60 mls/hr  [id number]


 IV  4/22/18 21:00


    4/25/18 12:47


 


 


 Miscellaneous


 Information  Patient in


 critical care


 unit?  Ass...  [id number]


 .XX  4/22/18 21:00


    4/22/18 21:00


 


 


  (Chlorhexidine


 2% Cloth)  3 pack  DAILY@04


 TOPICAL  4/23/18 04:00


 4/27/18 04:01  4/25/18 04:00


 


 


  (Chlorhexidine


 2% Cloth)  3 pack  UNSCH  PRN


 TOPICAL  4/22/18 21:00


 4/27/18 20:59   


 


 


  (Lasix)  20 mg  DAILY


 PO  4/23/18 09:00


    4/25/18 09:49


 


 


  (Arielle-Colace)  1 tab  BID  PRN


 PO  4/25/18 13:15


     


 


 


  (Ativan)  0.25 mg  Q6H  PRN


 PO  4/25/18 13:15


     


 


 


  (Pill Splitter)  1 ea  UNSCH  PRN


 OTHER  4/25/18 13:15


     


 








Vital Signs / I&O





Vital Signs








  Date Time  Temp Pulse Resp B/P (MAP) Pulse Ox O2 Delivery O2 Flow Rate FiO2


 


4/25/18 14:00  84      


 


4/25/18 12:00 98.2 101 25 100/85 (90) 98   


 


4/25/18 12:00  101      


 


4/25/18 10:00  100      


 


4/25/18 09:00  89 26 112/64 (80) 100   


 


4/25/18 08:00  84      


 


4/25/18 08:00 97.9 84 17 117/66 (83) 100   


 


4/25/18 07:50     100 Nasal Cannula 2.00 


 


4/25/18 06:00 98.6 81 19 112/66 (81) 99   


 


4/25/18 06:00 98.5       


 


4/25/18 06:00  81      


 


4/25/18 04:00  87 12 111/66 (81) 100   


 


4/25/18 04:00  87      


 


4/25/18 02:00  79      


 


4/25/18 00:00  87 44 104/69 (81) 98   


 


4/25/18 00:00  87      


 


4/24/18 22:00  92      


 


4/24/18 20:56     99 Nasal Cannula 2.00 


 


4/24/18 20:00  97      


 


4/24/18 20:00 98.3 97 26 109/69 (82) 98   














I/O      


 


 4/24/18 4/24/18 4/24/18 4/25/18 4/25/18 4/25/18





 07:00 15:00 23:00 07:00 15:00 23:00


 


Intake Total 60 ml 1045 ml 300 ml 200 ml 1080 ml 


 


Output Total 350 ml  525 ml 500 ml  


 


Balance -290 ml 1045 ml -225 ml -300 ml 1080 ml 


 


      


 


Intake Oral 60 ml  200 ml 200 ml  


 


IV Total  220 ml 100 ml  1080 ml 


 


Packed Cells  800 ml    


 


Blood Product IV Normal Saline Flush  25 ml    


 


Output Urine Total 350 ml  525 ml 500 ml  


 


Stool Total   0 ml 0 ml  








Physical Exam


GENERAL: Well developed, well nourished. No acute distress.


HEENT: Jugular venous pressure is normal. 


CHEST: Lungs clear to auscultation bilaterally. Unlabored respiratory effort.


CARDIAC: Regular rate and rhythm; harsh GEORGES


ABDOMEN: Soft, nontender, no hepatosplenomegaly. Bowel sounds present.


EXTREMITIES: No clubbing, cyanosis, or edema.


Laboratory





Laboratory Tests








Test


  4/25/18


02:38


 


White Blood Count 8.6 TH/MM3 


 


Red Blood Count 3.58 MIL/MM3 


 


Hemoglobin 9.9 GM/DL 


 


Hematocrit 30.3 % 


 


Mean Corpuscular Volume 84.6 FL 


 


Mean Corpuscular Hemoglobin 27.8 PG 


 


Mean Corpuscular Hemoglobin


Concent 32.9 % 


 


 


Red Cell Distribution Width 18.7 % 


 


Platelet Count 227 TH/MM3 


 


Mean Platelet Volume 8.5 FL 


 


Neutrophils (%) (Auto) 81.7 % 


 


Lymphocytes (%) (Auto) 8.2 % 


 


Monocytes (%) (Auto) 8.2 % 


 


Eosinophils (%) (Auto) 1.6 % 


 


Basophils (%) (Auto) 0.3 % 


 


Neutrophils # (Auto) 7.0 TH/MM3 


 


Lymphocytes # (Auto) 0.7 TH/MM3 


 


Monocytes # (Auto) 0.7 TH/MM3 


 


Eosinophils # (Auto) 0.1 TH/MM3 


 


Basophils # (Auto) 0.0 TH/MM3 


 


CBC Comment DIFF FINAL 


 


Differential Comment  


 


Blood Urea Nitrogen 32 MG/DL 


 


Creatinine 1.78 MG/DL 


 


Random Glucose 103 MG/DL 


 


Calcium Level 8.0 MG/DL 


 


Sodium Level 139 MEQ/L 


 


Potassium Level 4.3 MEQ/L 


 


Chloride Level 106 MEQ/L 


 


Carbon Dioxide Level 25.4 MEQ/L 


 


Anion Gap 8 MEQ/L 


 


Estimat Glomerular Filtration


Rate 37 ML/MIN 


 











Assessment and Plan


Assessment and Plan


1.  Non-ST elevation myocardial infarction - likely secondary


   





2.  Severe aortic stenosis - no symptoms today but persistent tachycardia => 

symptomatic AS, 


   - he remains high risk for procedures


   -prognosis is poor





3.  Cardiomyopathy - EF 35%  The patient is still not going to be able to


tolerate a beta blocker or ACE inhibitor at this point.





4.  Gastrointestinal bleed - per GI





5. DNR











Radha Mason MD Apr 25, 2018 18:58

## 2018-04-25 NOTE — HHI.GIFU
Subjective


Remarks


Pt resting in bed.  Denies chest pain.  c/o of groin burning after his bleed 

scan.  INsists he is having internal bleeding d/t constipation, but denies any 

visible rectal bleeding.


 (Rupal Bauer)





Objective


Vitals I&O





Vital Signs








  Date Time  Temp Pulse Resp B/P (MAP) Pulse Ox O2 Delivery O2 Flow Rate FiO2


 


4/25/18 09:00  89 26 112/64 (80) 100   


 


4/25/18 08:00  84      


 


4/25/18 08:00 97.9 84 17 117/66 (83) 100   


 


4/25/18 07:50     100 Nasal Cannula 2.00 


 


4/25/18 06:00 98.6 81 19 112/66 (81) 99   


 


4/25/18 06:00 98.5       


 


4/25/18 06:00  81      


 


4/25/18 04:00  87 12 111/66 (81) 100   


 


4/25/18 04:00  87      


 


4/25/18 02:00  79      


 


4/25/18 00:00  87 44 104/69 (81) 98   


 


4/25/18 00:00  87      


 


4/24/18 22:00  92      


 


4/24/18 20:56     99 Nasal Cannula 2.00 


 


4/24/18 20:00  97      


 


4/24/18 20:00 98.3 97 26 109/69 (82) 98   


 


4/24/18 18:00  93      


 


4/24/18 16:00 98.6 82 26 106/57 (73) 97   


 


4/24/18 16:00  82      


 


4/24/18 14:00  91      


 


4/24/18 12:06 98.7 92 22 129/59 100   


 


4/24/18 12:00  91      


 


4/24/18 12:00 98.7 91 26 129/59 (82) 100   


 


4/24/18 11:55 98.6 95 16 118/60 100   


 


4/24/18 10:21     100 Nasal Cannula 2.00 














I/O      


 


 4/24/18 4/24/18 4/24/18 4/25/18 4/25/18 4/25/18





 06:59 14:59 22:59 06:59 14:59 22:59


 


Intake Total 60 ml 985 ml 360 ml 200 ml  


 


Output Total 350 ml  525 ml 500 ml  


 


Balance -290 ml 985 ml -165 ml -300 ml  


 


      


 


Intake Oral 60 ml  200 ml 200 ml  


 


IV Total  160 ml 160 ml   


 


Packed Cells  800 ml    


 


Blood Product IV Normal Saline Flush  25 ml    


 


Output Urine Total 350 ml  525 ml 500 ml  


 


Stool Total   0 ml 0 ml  








Laboratory





Laboratory Tests








Test


  4/24/18


17:57 4/25/18


02:38


 


Hemoglobin 10.0  9.9 


 


Hematocrit 28.8  30.3 


 


White Blood Count  8.6 


 


Red Blood Count  3.58 


 


Mean Corpuscular Volume  84.6 


 


Mean Corpuscular Hemoglobin  27.8 


 


Mean Corpuscular Hemoglobin


Concent 


  32.9 


 


 


Red Cell Distribution Width  18.7 


 


Platelet Count  227 


 


Mean Platelet Volume  8.5 


 


Neutrophils (%) (Auto)  81.7 


 


Lymphocytes (%) (Auto)  8.2 


 


Monocytes (%) (Auto)  8.2 


 


Eosinophils (%) (Auto)  1.6 


 


Basophils (%) (Auto)  0.3 


 


Neutrophils # (Auto)  7.0 


 


Lymphocytes # (Auto)  0.7 


 


Monocytes # (Auto)  0.7 


 


Eosinophils # (Auto)  0.1 


 


Basophils # (Auto)  0.0 


 


CBC Comment  DIFF FINAL 


 


Differential Comment   


 


Blood Urea Nitrogen  32 


 


Creatinine  1.78 


 


Random Glucose  103 


 


Calcium Level  8.0 


 


Sodium Level  139 


 


Potassium Level  4.3 


 


Chloride Level  106 


 


Carbon Dioxide Level  25.4 


 


Anion Gap  8 


 


Estimat Glomerular Filtration


Rate 


  37 


 








Imaging





Last Impressions








GI Bleed Scan Nuclear Medicine 4/24/18 0000 Signed





Impressions: 





 Service Date/Time:  Tuesday, April 24, 2018 14:42 - CONCLUSION: No findings of 





 active GI bleed.     Gal Mayfield MD 


 


Abdomen/Pelvis CT 4/22/18 1947 Signed





Impressions: 





 Service Date/Time:  Sunday, April 22, 2018 20:13 - CONCLUSION:  1. No acute 





 finding is identified to explain the clinical symptoms. 2. Nonacute findings 





 include cholelithiasis, severe atherosclerotic disease, and prostatomegaly.   

  





 Adi Niño MD 


 


Chest X-Ray 4/22/18 1031 Signed





Impressions: 





 Service Date/Time:  Sunday, April 22, 2018 17:50 - CONCLUSION:  Underinflation 





 with atelectasis at the lung bases. Otherwise, no acute cardiopulmonary 





 abnormality is identified.     Adi Niño MD 








Physical Exam


HEENT: PERRL; normocephalic; atraumatic; no jaundice.  


CHEST:  CTA


CARDIAC:  RRR + murmur


ABDOMEN:  Soft, nondistended, nontender; no hepatosplenomegaly; bowel sounds 

are present in all four quadrants.


EXTREMITIES: No clubbing, cyanosis, or edema.


SKIN:  Normal; no rash; no jaundice.


CNS:  No focal deficits; alert and oriented times three.


 (Rupal Bauer)





Assessment and Plan


Plan


ASSESSMENT


- anemia, black tarry stool - poss UGIB location unclear.  had EGD and 

colonoscopy 2/24/18 no bleeding seen


   scheduled for outpt CE today.  ASA and plavix held.  per cardiology he is 

high risk for procedure but its "not


   totally unreasonable."   CT showed no acute findings


-NSTEMI - cardiology following.  needs TAVR but not good candidate and would 

need anemia resolved





4/24/18 after episode of chest pain this  am, cardiology has declared pt "ultra 

high risk" for procedures and that may be 


 "reasonable" to proceed with endoscopy if he is stable and pain free.  will 

hold for now.  HH continues to trend down despite


  transfusions.  NO obvious bleeding, pt denies rectal bleeding.  palliative 

care consulted.





4/25/18  HH is stable today.  bleed scan was neg.  palliative care now 

following.  has had increasing troponins.


    denies chest pain today.  d/w palliative care.  ?hospice





PLAN


- soft diet


- await cardiology input


- EGD when stable/pain free per cardiology, is ultra high risk


- inpt EGD vs outpt capsule endoscopy


- monitor labs


- transfuse as needed


- notify GI of active bleeding


 





pt seen by myself and Dr Cedeño and this note is on his behalf


 (Rupal Bauer)


Physician Comments


Patient seen and examined


Agree with above


Continue with current supportive care


Monitor labs


Bleeding scan negative


Endoscopy if actively bleeding or when more stable


 (Sim Cedeño MD)











Rupal Bauer Apr 25, 2018 10:17


Sim Cedeño MD Apr 25, 2018 21:14

## 2018-04-25 NOTE — HHI.PR
Subjective


Remarks


Patient says he is feeling all right.  Denies any chest pain today.  Denies 

shortness of breath.denies bleeding.  he does report right groin pain at the 

time of bleeding scan, however says this has resolved.





Objective





Vital Signs








  Date Time  Temp Pulse Resp B/P (MAP) Pulse Ox O2 Delivery O2 Flow Rate FiO2


 


4/25/18 22:00  97      


 


4/25/18 21:41     98 Nasal Cannula 2.00 


 


4/25/18 20:00  107      


 


4/25/18 20:00 98.1 107 18 115/73 (87) 100   


 


4/25/18 18:00  97      


 


4/25/18 16:00  80      


 


4/25/18 16:00 98.0 80 25 110/69 (83) 100   


 


4/25/18 14:00  84      


 


4/25/18 12:00 98.2 101 25 100/85 (90) 98   


 


4/25/18 12:00  101      


 


4/25/18 10:00  100      


 


4/25/18 09:00  89 26 112/64 (80) 100   


 


4/25/18 08:00  84      


 


4/25/18 08:00 97.9 84 17 117/66 (83) 100   


 


4/25/18 07:50     100 Nasal Cannula 2.00 


 


4/25/18 06:00 98.6 81 19 112/66 (81) 99   


 


4/25/18 06:00 98.5       


 


4/25/18 06:00  81      


 


4/25/18 04:00  87 12 111/66 (81) 100   


 


4/25/18 04:00  87      


 


4/25/18 02:00  79      


 


4/25/18 00:00  87 44 104/69 (81) 98   


 


4/25/18 00:00  87      














I/O      


 


 4/25/18 4/25/18 4/25/18 4/26/18 4/26/18 4/26/18





 07:00 15:00 23:00 07:00 15:00 23:00


 


Intake Total 200 ml 1080 ml 1680 ml   


 


Output Total 500 ml  725 ml   


 


Balance -300 ml 1080 ml 955 ml   


 


      


 


Intake Oral 200 ml  1680 ml   


 


IV Total  1080 ml    


 


Output Urine Total 500 ml  725 ml   


 


Stool Total 0 ml  0 ml   








Result Diagram:  


4/25/18 0238                                                                   

             4/25/18 0238





Objective Remarks


GENERAL: patient lying in bed.  Appears comfortable.


SKIN: Warm and dry.


HEAD: Normocephalic.


EYES: No scleral icterus. No injection or drainage. 


NECK: Supple, trachea midline. No JVD or lymphadenopathy.


CARDIOVASCULAR: Regular rate and rhythm without murmurs, gallops, or rubs. 


RESPIRATORY: Breath sounds equal bilaterally. No accessory muscle use.


GASTROINTESTINAL: Abdomen soft, non-tender, nondistended. 


MUSCULOSKELETAL: No cyanosis, or edema. 


BACK: Nontender without obvious deformity. No CVA tenderness.








A/P


Assessment and Plan


1.  GI bleed, symptomatic anemia: H&H decreased again this morning.  Transfuse 

2 units PRBCs.  Monitor H&H closely.  Continue IV Protonix drip.  Appreciate GI 

recommendations.  Patient was scheduled for capsule endoscopy as an outpatient 

today.  N.p.o. for procedure, however procedure has been cancelled as the 

patient is too unstable at this time.


= 4/25.  Bleeding scan was negative.  Hemoglobin is stable for 1 day.  Continue 

to monitor.


2.  Non-ST elevation MI: Likely secondary to severe anemia.  Patient has known 

history of coronary artery disease.  Status post cardiac catheterization on 4/1/ 18, which showed severe aortic stenosis with total occlusion of nondominant 

right coronary artery and 50% stenosis of the distal circumflex.  Medical 

management was recommended at that time.  Appreciate cardiology 

recommendations.  Aspirin, Plavix on hold secondary to GI bleed.  No 

anticoagulation at this time secondary to bleeding.  Beta-blocker on hold.


= Cardiology following.  Appreciate assistance.


3.  Hypertension: Blood pressure acceptable.  Lisinopril, Coreg on hold.


4.  Hyperlipidemia: Not on medications.


5.  Diabetes mellitus: Monitor Accu-Cheks and cover with sliding scale insulin.


6.  Acute kidney injury superimposed on chronic kidney disease stage III: 

Gentle IV fluid hydration.  Monitor BUN and creatinine.


7.  DVT prophylaxis: SCDs.  Chemical prophylaxis on hold secondary to GI bleed.


8.  Severe aortic stenosis: Not a candidate for surgery at this time.


9.  Consult palliative care.  Patient's prognosis is guarded.


Discharge Planning


transfer to [facility]


We'll need cardiology and gastroenterology clearance.











Jignesh Burr MD Apr 25, 2018 23:14

## 2018-04-25 NOTE — HHI.HCPN
Reason for visit


   a.  To assist with evaluation and management of symptoms including:  pain; 

nausea; constipation; generalized weakness; anxiety


   b.  To assist medical decision maker(s) with: better understanding of 

current medical conditions; weighing benefits/burdens of medical treatment 

options; making  medical treatment decisions.


.





Subjective/Interval History


Patient underwent bleeding scan late yesterday which revealed NO active 

bleeding site.  Patient reports several episodes of a burning type sensation in 

the groin where the catheter was inserted.  He feels fine now, but he is quite 

anxious that burning is caused by internal bleeding caused by the procedure or 

part of a needle or catheter left in place. It is difficult to reassure him 

even though he is feeling better, groin appears benign, and Hg appears to be 

stabilizing (10.0 after transfusion and 9.9 this AM). 





No bleeding noted but no bowel movement in days. Patient denies nausea.  Denies 

chest pressure.  May have had a brief , mild palpitation this AM. Denies 

dyspnea.


Troponin up to >40 today; CD-MB up to 8.4%.





Spoke to gastroentertology today.  They are still considering endoscopy if 

cleared by cardiology.  In meantime, will provide patient with diet.


.


Family/friend interactions


No family/friends at bedside.


.





Advance Directives


Living Will:  Never completed


Health Care Surrogate:  Never completed


Durable Power of :  Never completed


Advance Directive Specifics


Date completed:


Patient never completed an advance directive.


.


Health Care Surrogate(s):


Patient has verbally indicated that he would want his wife to serve as HCS.


.


Documented care wishes:


No written documentation of health care goals/preferences.


.





Objective





Vital Signs








  Date Time  Temp Pulse Resp B/P (MAP) Pulse Ox O2 Delivery O2 Flow Rate FiO2


 


4/25/18 14:00  84      


 


4/25/18 12:00 98.2 101 25 100/85 (90) 98   


 


4/25/18 12:00  101      


 


4/25/18 10:00  100      


 


4/25/18 09:00  89 26 112/64 (80) 100   


 


4/25/18 08:00  84      


 


4/25/18 08:00 97.9 84 17 117/66 (83) 100   


 


4/25/18 07:50     100 Nasal Cannula 2.00 


 


4/25/18 06:00 98.6 81 19 112/66 (81) 99   


 


4/25/18 06:00 98.5       


 


4/25/18 06:00  81      


 


4/25/18 04:00  87 12 111/66 (81) 100   


 


4/25/18 04:00  87      


 


4/25/18 02:00  79      


 


4/25/18 00:00  87 44 104/69 (81) 98   


 


4/25/18 00:00  87      


 


4/24/18 22:00  92      


 


4/24/18 20:56     99 Nasal Cannula 2.00 


 


4/24/18 20:00  97      


 


4/24/18 20:00 98.3 97 26 109/69 (82) 98   


 


4/24/18 18:00  93      


 


4/24/18 16:00 98.6 82 26 106/57 (73) 97   


 


4/24/18 16:00  82      














Intake & Output  


 


 4/25/18 4/25/18





 07:00 19:00


 


Intake Total 300 ml 100 ml


 


Output Total 500 ml 


 


Balance -200 ml 100 ml


 


  


 


Intake Oral 200 ml 


 


IV Total 100 ml 100 ml


 


Output Urine Total 500 ml 


 


Stool Total 0 ml 





.


Physical Exam


CONSTITUTIONAL/GENERAL: This is an adequately nourished patient, awake, alert, 

conversant in and MICU bed .  Appears anxious, otherwise no distress. 


TUBES/LINES/DRAINS:  Peripheral IV;  No wounds seen anteriorly. Skin 

temperature appropriate. Not diaphoretic. 


EYES: Pupils equal and round. Extraocular motions intact. No scleral icterus. 

No injection or drainage. Fundi not examined.


ENT: Hearing grossly normal. Nose without bleeding or purulent drainage. Throat 

without visible erythema, exudates, masses, or lesions.  Edentulous. 


NECK: Trachea midline. Supple, nontender. 


CARDIOVASCULAR: Regular rate and rhythm without murmurs, gallops, or rubs. No 

JVD. 


RESPIRATORY/CHEST: Symmetric, unlabored respirations. Clear to auscultation. 

Breath sounds equal bilaterally. No wheezes, rales, or rhonchi.  


GASTROINTESTINAL: Abdomen soft, non-tender, nondistended. No hepato-splenomegaly

, or palpable masses. No guarding. Bowel sounds present. RIGHT GROIN EXAMINED 

WHERE PATIENT FELT HIS BURNING SENSATION WAS CENTERED.  NO LESION, SWELLING, 

ERYTHEMA, TENDERNESS NOTED. NO HEMATOMA. NO INGUINAL ADENOPATHY NOTED


GENITOURINARY: Without palpable bladder distension. 


MUSCULOSKELETAL: Extremities without clubbing, cyanosis.  No pedal edema, but 

SCDs in place.  No calf tenderness. No mottling.


LYMPHATICS: Not examined. 


NEUROLOGICAL: Awake and alert. Motor and sensory grossly within normal limits. 

Follows commands. Cognitively sharp. Moves all extremities.


PSYCHIATRIC:  Far more anxious today than 04/24/18.  No obvious depression. No 

apparent hallucinations or other psychotic thought process.


.





Diagnostic Tests


Laboratory





Laboratory Tests








Test


  4/22/18


18:07 4/22/18


20:50 4/23/18


00:30 4/23/18


05:58


 


White Blood Count


  12.6 TH/MM3


(4.0-11.0) 


  


  11.0 TH/MM3


(4.0-11.0)


 


Red Blood Count


  2.03 MIL/MM3


(4.50-5.90) 


  


  3.23 MIL/MM3


(4.50-5.90)


 


Hemoglobin


  5.5 GM/DL


(13.0-17.0) 


  


  8.9 GM/DL


(13.0-17.0)


 


Hematocrit


  17.4 %


(39.0-51.0) 


  


  26.8 %


(39.0-51.0)


 


Mean Corpuscular Volume


  85.8 FL


(80.0-100.0) 


  


  83.0 FL


(80.0-100.0)


 


Mean Corpuscular Hemoglobin


  27.0 PG


(27.0-34.0) 


  


  27.5 PG


(27.0-34.0)


 


Mean Corpuscular Hemoglobin


Concent 31.5 %


(32.0-36.0) 


  


  33.1 %


(32.0-36.0)


 


Red Cell Distribution Width


  21.4 %


(11.6-17.2) 


  


  20.2 %


(11.6-17.2)


 


Platelet Count


  390 TH/MM3


(150-450) 


  


  277 TH/MM3


(150-450)


 


Mean Platelet Volume


  9.6 FL


(7.0-11.0) 


  


  8.9 FL


(7.0-11.0)


 


Neutrophils (%) (Auto)


  91.6 %


(16.0-70.0) 


  


  86.5 %


(16.0-70.0)


 


Lymphocytes (%) (Auto)


  3.1 %


(9.0-44.0) 


  


  6.1 %


(9.0-44.0)


 


Monocytes (%) (Auto)


  5.0 %


(0.0-8.0) 


  


  7.3 %


(0.0-8.0)


 


Eosinophils (%) (Auto)


  0.0 %


(0.0-4.0) 


  


  0.0 %


(0.0-4.0)


 


Basophils (%) (Auto)


  0.3 %


(0.0-2.0) 


  


  0.1 %


(0.0-2.0)


 


Neutrophils # (Auto)


  11.6 TH/MM3


(1.8-7.7) 


  


  9.5 TH/MM3


(1.8-7.7)


 


Lymphocytes # (Auto)


  0.4 TH/MM3


(1.0-4.8) 


  


  0.7 TH/MM3


(1.0-4.8)


 


Monocytes # (Auto)


  0.6 TH/MM3


(0-0.9) 


  


  0.8 TH/MM3


(0-0.9)


 


Eosinophils # (Auto)


  0.0 TH/MM3


(0-0.4) 


  


  0.0 TH/MM3


(0-0.4)


 


Basophils # (Auto)


  0.0 TH/MM3


(0-0.2) 


  


  0.0 TH/MM3


(0-0.2)


 


CBC Comment DIFF FINAL    DIFF FINAL 


 


Differential Comment      


 


Prothrombin Time


  11.4 SEC


(9.8-11.6) 


  


  


 


 


Prothromb Time International


Ratio 1.1 RATIO 


  


  


  


 


 


Activated Partial


Thromboplast Time 22.9 SEC


(24.3-30.1) 


  


  


 


 


Blood Urea Nitrogen


  36 MG/DL


(7-18) 


  


  39 MG/DL


(7-18)


 


Creatinine


  2.07 MG/DL


(0.60-1.30) 


  


  2.03 MG/DL


(0.60-1.30)


 


Random Glucose


  160 MG/DL


() 


  


  138 MG/DL


()


 


Total Protein


  6.8 GM/DL


(6.4-8.2) 


  


  6.5 GM/DL


(6.4-8.2)


 


Albumin


  3.8 GM/DL


(3.4-5.0) 


  


  3.8 GM/DL


(3.4-5.0)


 


Calcium Level


  9.1 MG/DL


(8.5-10.1) 


  


  8.9 MG/DL


(8.5-10.1)


 


Alkaline Phosphatase


  72 U/L


() 


  


  70 U/L


()


 


Aspartate Amino Transf


(AST/SGOT) 32 U/L (15-37) 


  


  


  138 U/L


(15-37)


 


Alanine Aminotransferase


(ALT/SGPT) 30 U/L (12-78) 


  


  


  35 U/L (12-78) 


 


 


Total Bilirubin


  0.6 MG/DL


(0.2-1.0) 


  


  1.1 MG/DL


(0.2-1.0)


 


Sodium Level


  131 MEQ/L


(136-145) 


  


  135 MEQ/L


(136-145)


 


Potassium Level


  5.1 MEQ/L


(3.5-5.1) 


  


  5.1 MEQ/L


(3.5-5.1)


 


Chloride Level


  99 MEQ/L


() 


  


  100 MEQ/L


()


 


Carbon Dioxide Level


  15.3 MEQ/L


(21.0-32.0) 


  


  22.7 MEQ/L


(21.0-32.0)


 


Anion Gap


  17 MEQ/L


(5-15) 


  


  12 MEQ/L


(5-15)


 


Estimat Glomerular Filtration


Rate 31 ML/MIN


(>89) 


  


  32 ML/MIN


(>89)


 


Troponin I


  2.45 NG/ML


(0.02-0.05) 


  19.30 NG/ML


(0.02-0.05) GREATER THAN


40.00 NG/ML


 


Lipase


  316 U/L


() 


  


  


 


 


Nasal Screen MRSA (PCR)


  


  MRSA NOT


DETECTED (NOT 


  


 


 


Total Creatine Kinase


  


  


  337 U/L


() 642 U/L


()


 


Creatine Kinase MB


  


  


  23.2 NG/ML


(0.5-3.6) 53.9 NG/ML


(0.5-3.6)


 


Creatine Kinase MB %


  


  


  6.9 %


(0.0-4.0) 8.4 %


(0.0-4.0)


 


Test


  4/23/18


13:45 4/24/18


04:15 4/24/18


17:57 4/25/18


02:38


 


Hemoglobin


  8.5 GM/DL


(13.0-17.0) 7.3 GM/DL


(13.0-17.0) 10.0 GM/DL


(13.0-17.0) 9.9 GM/DL


(13.0-17.0)


 


Hematocrit


  25.2 %


(39.0-51.0) 21.8 %


(39.0-51.0) 28.8 %


(39.0-51.0) 30.3 %


(39.0-51.0)


 


White Blood Count


  


  8.9 TH/MM3


(4.0-11.0) 


  8.6 TH/MM3


(4.0-11.0)


 


Red Blood Count


  


  2.62 MIL/MM3


(4.50-5.90) 


  3.58 MIL/MM3


(4.50-5.90)


 


Mean Corpuscular Volume


  


  82.9 FL


(80.0-100.0) 


  84.6 FL


(80.0-100.0)


 


Mean Corpuscular Hemoglobin


  


  27.7 PG


(27.0-34.0) 


  27.8 PG


(27.0-34.0)


 


Mean Corpuscular Hemoglobin


Concent 


  33.4 %


(32.0-36.0) 


  32.9 %


(32.0-36.0)


 


Red Cell Distribution Width


  


  19.9 %


(11.6-17.2) 


  18.7 %


(11.6-17.2)


 


Platelet Count


  


  207 TH/MM3


(150-450) 


  227 TH/MM3


(150-450)


 


Mean Platelet Volume


  


  8.8 FL


(7.0-11.0) 


  8.5 FL


(7.0-11.0)


 


Neutrophils (%) (Auto)


  


  86.8 %


(16.0-70.0) 


  81.7 %


(16.0-70.0)


 


Lymphocytes (%) (Auto)


  


  4.2 %


(9.0-44.0) 


  8.2 %


(9.0-44.0)


 


Monocytes (%) (Auto)


  


  8.9 %


(0.0-8.0) 


  8.2 %


(0.0-8.0)


 


Eosinophils (%) (Auto)


  


  0.1 %


(0.0-4.0) 


  1.6 %


(0.0-4.0)


 


Basophils (%) (Auto)


  


  0.0 %


(0.0-2.0) 


  0.3 %


(0.0-2.0)


 


Neutrophils # (Auto)


  


  7.7 TH/MM3


(1.8-7.7) 


  7.0 TH/MM3


(1.8-7.7)


 


Lymphocytes # (Auto)


  


  0.4 TH/MM3


(1.0-4.8) 


  0.7 TH/MM3


(1.0-4.8)


 


Monocytes # (Auto)


  


  0.8 TH/MM3


(0-0.9) 


  0.7 TH/MM3


(0-0.9)


 


Eosinophils # (Auto)


  


  0.0 TH/MM3


(0-0.4) 


  0.1 TH/MM3


(0-0.4)


 


Basophils # (Auto)


  


  0.0 TH/MM3


(0-0.2) 


  0.0 TH/MM3


(0-0.2)


 


CBC Comment  AUTO DIFF   DIFF FINAL 


 


Differential Comment


  


  AUTO DIFF


CONFIRMED 


   


 


 


Blood Urea Nitrogen


  


  38 MG/DL


(7-18) 


  32 MG/DL


(7-18)


 


Creatinine


  


  1.86 MG/DL


(0.60-1.30) 


  1.78 MG/DL


(0.60-1.30)


 


Random Glucose


  


  126 MG/DL


() 


  103 MG/DL


()


 


Total Protein


  


  5.5 GM/DL


(6.4-8.2) 


  


 


 


Albumin


  


  3.0 GM/DL


(3.4-5.0) 


  


 


 


Calcium Level


  


  7.6 MG/DL


(8.5-10.1) 


  8.0 MG/DL


(8.5-10.1)


 


Phosphorus Level


  


  3.9 MG/DL


(2.5-4.9) 


  


 


 


Magnesium Level


  


  2.1 MG/DL


(1.5-2.5) 


  


 


 


Alkaline Phosphatase


  


  60 U/L


() 


  


 


 


Aspartate Amino Transf


(AST/SGOT) 


  85 U/L (15-37) 


  


  


 


 


Alanine Aminotransferase


(ALT/SGPT) 


  28 U/L (12-78) 


  


  


 


 


Total Bilirubin


  


  0.8 MG/DL


(0.2-1.0) 


  


 


 


Sodium Level


  


  139 MEQ/L


(136-145) 


  139 MEQ/L


(136-145)


 


Potassium Level


  


  4.1 MEQ/L


(3.5-5.1) 


  4.3 MEQ/L


(3.5-5.1)


 


Chloride Level


  


  107 MEQ/L


() 


  106 MEQ/L


()


 


Carbon Dioxide Level


  


  23.0 MEQ/L


(21.0-32.0) 


  25.4 MEQ/L


(21.0-32.0)


 


Anion Gap  9 MEQ/L (5-15)   8 MEQ/L (5-15) 


 


Estimat Glomerular Filtration


Rate 


  35 ML/MIN


(>89) 


  37 ML/MIN


(>89)





.


Result Diagram:  


4/25/18 0238                                                                   

             4/25/18 0238





Imaging





Last Impressions








GI Bleed Scan Nuclear Medicine 4/24/18 0000 Signed





Impressions: 





 Service Date/Time:  Tuesday, April 24, 2018 14:42 - CONCLUSION: No findings of 





 active GI bleed.     Gal Mayfield MD 


 


Abdomen/Pelvis CT 4/22/18 1947 Signed





Impressions: 





 Service Date/Time:  Sunday, April 22, 2018 20:13 - CONCLUSION:  1. No acute 





 finding is identified to explain the clinical symptoms. 2. Nonacute findings 





 include cholelithiasis, severe atherosclerotic disease, and prostatomegaly.   

  





 Adi Niño MD 


 


Chest X-Ray 4/22/18 1733 Signed





Impressions: 





 Service Date/Time:  Sunday, April 22, 2018 17:50 - CONCLUSION:  Underinflation 





 with atelectasis at the lung bases. Otherwise, no acute cardiopulmonary 





 abnormality is identified.     Adi Niño MD 





.





Assessment and Plan


Disease Oriented Problem List:  


(1) NSTEMI (non-ST elevated myocardial infarction)


Comment:  Quite elevated tropoinin and CK. 


.





(2) Aortic stenosis


Comment:  Severe





(3) Ischemic cardiomyopathy


(4) JOSIE (acute kidney injury)


(5) GI bleed


Comment:  Unkown location.





(6) Severe anemia


(7) Diabetes mellitus


Symptom Scale:  


(1) Pain


0-10 Scale:  Unable to quantify


Comment:  Pain:  Patient reports intermittent pressure-type chest pain that 

does not radiate to neck or arms.  It does not appear to be worse with exertion 

but is more likely to occur when he is anemic.  It seem to improve after he 

passes gas. Also developed burning -type pain centered in groin area where 

catheter was inserted for bleeding scan.  Burning pain was intermittent and at 

times seemed to radiate to abdomen.  Would disappear on own.  No known 

mitigating or exacerbating factors. 


* 





(2) Generalized weakness


0-10 Scale:  Unable to quantify


Comment:  Generalized weakness:  the generalized weakness appears to be most 

intense when hemoglobin is low and improves with transfusion. 


.





(3) Nausea


0-10 Scale:  Unable to quantify


Comment:  Was severe in days leading up to this admission.  Now mostly 

resolved. 


.





(4) Constipation


0-10 Scale:  Unable to quantify


Comment:  This is perrenial problem for the patient. 


.





(5) Anxiety


0-10 Scale:  Unable to quantify


Comment:  Anxiety became apparent on 04/25/18. Focus of his anxiety was that 

something had gone wrong with the bleeding scan and the procedure itself might 

be causing bleeding.


.





Pertinent Non-Medical Issues


Psychosocial:  Well supported locally by wife and son with whom he lives.  

Other children in [place]


Spiritual:  [place] background.  Not a member of a local parish. 


Legal:  No advance directives.  


Ethical issues impacting care:  Patient is capacitated to make his own health 

care decisions.


.


Important Contacts


* Shahnaz Cho  (spouse and verbally designated health care surrogate)  [phone]


* Amor Cho (son that lives with patient)   [phone]


.


Prognosis


Patient has severe aortic stenosis (and just had a non-STEMI) and per patient, 

the cardiologist has explained he will likely not live a year without a new 

valve.  However, he is not felt to be a reasonable surgical candidate for 

either open heart or TAVR valve surgery.  Given his bleeding, he will likely 

have to remain off anti-coagulants.  In the meantime, patient has a recurrent 

GI bleed from an unknown source.   Unless the bleeding stops on its own or can 

be stopped by a low risk procedure, bleeding will likely continue.  The 

combination of his cardiac conditions and GI bleeding make life expectancy 

likely 6 months or less. 





Patient is eligible for hospice care at such time that goals are primarily 

comfort oriented.


.


Code Status:  No Code


Plan


==  Code Status:  Patient has opted for NO CODE status. This decision was made 

with his wife and son at the bedside and participating in the discussion on 04/ 24/18.





==  Decision Making:  Patient is capacitated to make his own health care 

decisions.  He has verbally stated that he wants his spouse to serve as health 

care surrogate should he become incapacitated. 





==  Goals of medical treatment:  Patient want DNR status but desires aggressive 

care short of resuscitation at this time. He is hoping that he can find a way 

to have the bleeding stop.  Once bleeding is stopped he will consider hospice 

care at home if there is no way to improve his cardiac conditions. 





==  Symptoms:


* Pain:  Patient reports intermittent pressure-type chest pain that does not 

radiate to neck or arms.  It does not appear to be worse with exertion but is 

more likely to occur when he is anemic.  It seem to improve after he passes 

gas. Also developed burning -type pain centered in groin area where catheter 

was inserted for bleeding scan.  Burning pain was intermittent and at times 

seemed to radiate to abdomen.  Would disappear on own.  No known mitigating or 

exacerbating factors. 


* Generalized weakness:  the generalized weakness appears to be most intense 

when hemoglobin is low and improves with transfusion. 


* Dyspnea:  Gets some dyspnea with exertion at home but is not on 02 there.  

Dyspnea managed in hospital with nasal cannula 02.  


* Nausea:  Improved .


* Constipation:  Chronic problem for patient.


* Anxiety:  Anxiety became apparent on 04/25/18. Focus of his anxiety was that 

something had gone wrong with the bleeding scan and the procedure itself might 

be causing bleeding.





==  Plan:


* Arielle-colace for constipation  


* Begin low dose prn lorazepam for anxiety -- 0.25 mg po q 6 hours prn anxiety. 





==  If Hg remains stable, may want to postpone endoscopy especially given 

rising troponin.  Would like to make sure bleeding has stopped and anxiety is 

under control before attempting to send him home with hospice.  Otherwise, I 

believe he will come right back to ED in spite of hospice being in place. 





==  Palliative care will continue to follow patient to assist with symptom 

management and to further clarify goals of medical treatment as the clinical 

course evolves.





Attestation


To help prompt me to consider important information that might be impacting 

today's encounter and assessment, information from prior notes written by 

myself or my colleagues may have been "brought forward" into today's note.  My 

signature on this note, however, is an attestation that I personally performed 

the exam, history, and/or decision-making noted today, and, unless otherwise 

indicated, the interactions with patient, family, and staff as well as the 

review of records all occurred today.  I also attest that the listed assessment 

and stated plan reflect my best clinical judgment today based on the 

combination of historical information, prior notes, and today's exam/ 

interactions.  When time spent is documented, it refers only to time spent 

today by the signer, or if indicated, combined time spent today by 

collaborating physician/nurse practitioner.


.











Charan Gallego MD Apr 25, 2018 15:46

## 2018-04-26 VITALS
OXYGEN SATURATION: 100 % | DIASTOLIC BLOOD PRESSURE: 64 MMHG | RESPIRATION RATE: 17 BRPM | SYSTOLIC BLOOD PRESSURE: 116 MMHG | HEART RATE: 90 BPM | TEMPERATURE: 98.2 F

## 2018-04-26 VITALS
TEMPERATURE: 97 F | RESPIRATION RATE: 30 BRPM | DIASTOLIC BLOOD PRESSURE: 81 MMHG | HEART RATE: 96 BPM | OXYGEN SATURATION: 94 % | SYSTOLIC BLOOD PRESSURE: 117 MMHG

## 2018-04-26 VITALS
HEART RATE: 93 BPM | TEMPERATURE: 99.6 F | OXYGEN SATURATION: 94 % | DIASTOLIC BLOOD PRESSURE: 54 MMHG | SYSTOLIC BLOOD PRESSURE: 97 MMHG | RESPIRATION RATE: 30 BRPM

## 2018-04-26 VITALS
DIASTOLIC BLOOD PRESSURE: 61 MMHG | HEART RATE: 92 BPM | RESPIRATION RATE: 24 BRPM | SYSTOLIC BLOOD PRESSURE: 115 MMHG | OXYGEN SATURATION: 96 % | TEMPERATURE: 97.8 F

## 2018-04-26 VITALS
HEART RATE: 83 BPM | RESPIRATION RATE: 24 BRPM | SYSTOLIC BLOOD PRESSURE: 105 MMHG | OXYGEN SATURATION: 97 % | DIASTOLIC BLOOD PRESSURE: 61 MMHG | TEMPERATURE: 98.6 F

## 2018-04-26 VITALS — OXYGEN SATURATION: 98 %

## 2018-04-26 VITALS
TEMPERATURE: 98 F | HEART RATE: 77 BPM | RESPIRATION RATE: 17 BRPM | OXYGEN SATURATION: 100 % | DIASTOLIC BLOOD PRESSURE: 62 MMHG | SYSTOLIC BLOOD PRESSURE: 109 MMHG

## 2018-04-26 VITALS — OXYGEN SATURATION: 95 %

## 2018-04-26 LAB
ALBUMIN SERPL-MCNC: 2.8 GM/DL (ref 3.4–5)
BASOPHILS # BLD AUTO: 0 TH/MM3 (ref 0–0.2)
BASOPHILS NFR BLD: 0.2 % (ref 0–2)
BUN SERPL-MCNC: 24 MG/DL (ref 7–18)
CALCIUM SERPL-MCNC: 8.1 MG/DL (ref 8.5–10.1)
CHLORIDE SERPL-SCNC: 106 MEQ/L (ref 98–107)
CREAT SERPL-MCNC: 1.5 MG/DL (ref 0.6–1.3)
EOSINOPHIL # BLD: 0.2 TH/MM3 (ref 0–0.4)
EOSINOPHIL NFR BLD: 2.3 % (ref 0–4)
ERYTHROCYTE [DISTWIDTH] IN BLOOD BY AUTOMATED COUNT: 19.1 % (ref 11.6–17.2)
GFR SERPLBLD BASED ON 1.73 SQ M-ARVRAT: 45 ML/MIN (ref 89–?)
GLUCOSE SERPL-MCNC: 113 MG/DL (ref 74–106)
HCO3 BLD-SCNC: 23.5 MEQ/L (ref 21–32)
HCT VFR BLD CALC: 29 % (ref 39–51)
HGB BLD-MCNC: 9.6 GM/DL (ref 13–17)
LYMPHOCYTES # BLD AUTO: 0.5 TH/MM3 (ref 1–4.8)
LYMPHOCYTES NFR BLD AUTO: 7.6 % (ref 9–44)
MAGNESIUM SERPL-MCNC: 2.3 MG/DL (ref 1.5–2.5)
MCH RBC QN AUTO: 28.2 PG (ref 27–34)
MCHC RBC AUTO-ENTMCNC: 33.2 % (ref 32–36)
MCV RBC AUTO: 84.9 FL (ref 80–100)
MONOCYTE #: 0.6 TH/MM3 (ref 0–0.9)
MONOCYTES NFR BLD: 8.2 % (ref 0–8)
NEUTROPHILS # BLD AUTO: 5.8 TH/MM3 (ref 1.8–7.7)
NEUTROPHILS NFR BLD AUTO: 81.7 % (ref 16–70)
PHOSPHATE SERPL-MCNC: 2.7 MG/DL (ref 2.5–4.9)
PLATELET # BLD: 193 TH/MM3 (ref 150–450)
PMV BLD AUTO: 8.9 FL (ref 7–11)
RBC # BLD AUTO: 3.42 MIL/MM3 (ref 4.5–5.9)
SODIUM SERPL-SCNC: 138 MEQ/L (ref 136–145)
WBC # BLD AUTO: 7.1 TH/MM3 (ref 4–11)

## 2018-04-26 RX ADMIN — WATER SCH ML: 1 IRRIGANT IRRIGATION at 18:52

## 2018-04-26 RX ADMIN — Medication SCH ML: at 08:05

## 2018-04-26 RX ADMIN — PHENYTOIN SODIUM SCH MLS/HR: 50 INJECTION INTRAMUSCULAR; INTRAVENOUS at 20:44

## 2018-04-26 RX ADMIN — INSULIN ASPART SCH: 100 INJECTION, SOLUTION INTRAVENOUS; SUBCUTANEOUS at 08:00

## 2018-04-26 RX ADMIN — PANTOPRAZOLE SCH MG: 40 TABLET, DELAYED RELEASE ORAL at 08:05

## 2018-04-26 RX ADMIN — IPRATROPIUM BROMIDE AND ALBUTEROL SULFATE SCH AMPULE: .5; 3 SOLUTION RESPIRATORY (INHALATION) at 20:13

## 2018-04-26 RX ADMIN — PHENYTOIN SODIUM SCH MLS/HR: 50 INJECTION INTRAMUSCULAR; INTRAVENOUS at 03:52

## 2018-04-26 RX ADMIN — INSULIN ASPART SCH: 100 INJECTION, SOLUTION INTRAVENOUS; SUBCUTANEOUS at 20:44

## 2018-04-26 RX ADMIN — CHLORHEXIDINE GLUCONATE SCH PACK: 500 CLOTH TOPICAL at 03:54

## 2018-04-26 RX ADMIN — INSULIN ASPART SCH: 100 INJECTION, SOLUTION INTRAVENOUS; SUBCUTANEOUS at 17:49

## 2018-04-26 RX ADMIN — IPRATROPIUM BROMIDE AND ALBUTEROL SULFATE SCH AMPULE: .5; 3 SOLUTION RESPIRATORY (INHALATION) at 04:22

## 2018-04-26 RX ADMIN — FUROSEMIDE SCH MG: 20 TABLET ORAL at 08:05

## 2018-04-26 RX ADMIN — INSULIN ASPART SCH: 100 INJECTION, SOLUTION INTRAVENOUS; SUBCUTANEOUS at 12:00

## 2018-04-26 RX ADMIN — IPRATROPIUM BROMIDE AND ALBUTEROL SULFATE SCH AMPULE: .5; 3 SOLUTION RESPIRATORY (INHALATION) at 09:32

## 2018-04-26 RX ADMIN — IPRATROPIUM BROMIDE AND ALBUTEROL SULFATE SCH AMPULE: .5; 3 SOLUTION RESPIRATORY (INHALATION) at 14:54

## 2018-04-26 RX ADMIN — Medication SCH ML: at 20:43

## 2018-04-26 NOTE — HHI.HCPN
Reason for visit


   a.  To assist with evaluation and management of symptoms including:  pain; 

nausea; constipation; generalized weakness; anxiety; dyspnea


   b.  To assist medical decision maker(s) with: better understanding of 

current medical conditions; weighing benefits/burdens of medical treatment 

options; making  medical treatment decisions.


.





Subjective/Interval History


Awake, alert, and conversant at time of my visit.  No family members present.





He is still having occasional right groin burning type pain. It is worse with 

movement.  However, overall the frequency of burning episodes and the severity 

of these is much less compared to that of 04/25/18.  He denies chest pain or 

palpitations.  Appetite is excellent.  His bowels have not moved yet, but he 

says, "I can tell I am working on one."  There has been no active bleeding 

noted and Hg remains stable.   He denies SOB and 02 sats have been good, but he 

gets tachypnic during conversation and with any exertion.  He remains 

tachycardic.  Less anxious appearing today and nurse confirms less anxiety -- 

he has not been given any of his PRN lorazepam. 





I once again address the possibility of hospice and he remains open to the 

plan.  


.


Family/friend interactions


No friends/family present at time of my visit.


.





Advance Directives


Living Will:  Never completed


Health Care Surrogate:  Never completed


Durable Power of :  Never completed


Advance Directive Specifics


Date completed:


Patient never completed an advance directive.


.


Health Care Surrogate(s):


Patient has verbally indicated that he would want his wife to serve as HCS.


.


Documented care wishes:


No written documentation of health care goals/preferences.


.





Objective





Vital Signs








  Date Time  Temp Pulse Resp B/P (MAP) Pulse Ox O2 Delivery O2 Flow Rate FiO2


 


4/26/18 12:00  92      


 


4/26/18 12:00 97.8 92 24 115/61 (79) 96   


 


4/26/18 09:33     98   21


 


4/26/18 08:00  83      


 


4/26/18 08:00 98.6 83 24 105/61 (76) 97   


 


4/26/18 04:00  77      


 


4/26/18 04:00 98.0 77 17 109/62 (78) 100   


 


4/26/18 00:00 98.2 90 17 116/64 (81) 100   


 


4/26/18 00:00  90      


 


4/25/18 22:00  97      


 


4/25/18 21:41     98 Nasal Cannula 2.00 


 


4/25/18 20:00  107      


 


4/25/18 20:00 98.1 107 18 115/73 (87) 100   


 


4/25/18 18:00  97      


 


4/25/18 16:00  80      


 


4/25/18 16:00 98.0 80 25 110/69 (83) 100   














Intake & Output  


 


 4/26/18 4/26/18





 07:00 19:00


 


Intake Total 1240 ml 


 


Output Total 650 ml 


 


Balance 590 ml 


 


  


 


Intake Oral 240 ml 


 


IV Total 1000 ml 


 


Output Urine Total 650 ml 


 


Stool Total 0 ml 





.


Physical Exam


CONSTITUTIONAL/GENERAL: This is an adequately nourished patient, awake, alert, 

conversant in and MICU bed .  Appears less anxious than on 04/25/18


TUBES/LINES/DRAINS:  Peripheral IV;  


SKIN:  No wounds seen anteriorly. Skin temperature appropriate. Not 

diaphoretic. Ecchymosis noted on medial-posterior area of left calf. 


EYES: Pupils equal and round. Extraocular motions intact. No scleral icterus. 

No injection or drainage. Fundi not examined.


ENT: Hearing grossly normal. Nose without bleeding or purulent drainage. Throat 

without visible erythema, exudates, masses, or lesions.  Edentulous. 


NECK: Trachea midline. Supple


CARDIOVASCULAR: Regular rate in low 100s and  regular rhythm without murmurs, 

gallops, or rubs. No JVD. 


RESPIRATORY/CHEST: Symmetric, unlabored respirations. Clear to auscultation. 

Breath sounds equal bilaterally. No wheezes, rales, or rhonchi.  


GASTROINTESTINAL: Abdomen soft, non-tender, nondistended. No hepato-splenomegaly

, or palpable masses. No guarding. Bowel sounds present. Right groin exam (site 

of his burning sensation) remains unremarkable. 


GENITOURINARY: Without palpable bladder distension. 


MUSCULOSKELETAL: Extremities without clubbing, cyanosis.  No pedal edema. No 

calf tenderness. No mottling.  Ecchymosis noted on medial-posterior area of 

left calf. 


LYMPHATICS: Not examined. 


NEUROLOGICAL: Awake and alert. Motor and sensory grossly within normal limits. 

Follows commands. Cognitively sharp. Moves all extremities.


PSYCHIATRIC:  Significantly less anxious than he was on 04/25/18.  No obvious 

depression. No apparent hallucinations or other psychotic thought process.


.





Diagnostic Tests


Laboratory





Laboratory Tests








Test


  4/24/18


04:15 4/24/18


17:57 4/25/18


02:38 4/26/18


06:47


 


White Blood Count


  8.9 TH/MM3


(4.0-11.0) 


  8.6 TH/MM3


(4.0-11.0) 7.1 TH/MM3


(4.0-11.0)


 


Red Blood Count


  2.62 MIL/MM3


(4.50-5.90) 


  3.58 MIL/MM3


(4.50-5.90) 3.42 MIL/MM3


(4.50-5.90)


 


Hemoglobin


  7.3 GM/DL


(13.0-17.0) 10.0 GM/DL


(13.0-17.0) 9.9 GM/DL


(13.0-17.0) 9.6 GM/DL


(13.0-17.0)


 


Hematocrit


  21.8 %


(39.0-51.0) 28.8 %


(39.0-51.0) 30.3 %


(39.0-51.0) 29.0 %


(39.0-51.0)


 


Mean Corpuscular Volume


  82.9 FL


(80.0-100.0) 


  84.6 FL


(80.0-100.0) 84.9 FL


(80.0-100.0)


 


Mean Corpuscular Hemoglobin


  27.7 PG


(27.0-34.0) 


  27.8 PG


(27.0-34.0) 28.2 PG


(27.0-34.0)


 


Mean Corpuscular Hemoglobin


Concent 33.4 %


(32.0-36.0) 


  32.9 %


(32.0-36.0) 33.2 %


(32.0-36.0)


 


Red Cell Distribution Width


  19.9 %


(11.6-17.2) 


  18.7 %


(11.6-17.2) 19.1 %


(11.6-17.2)


 


Platelet Count


  207 TH/MM3


(150-450) 


  227 TH/MM3


(150-450) 193 TH/MM3


(150-450)


 


Mean Platelet Volume


  8.8 FL


(7.0-11.0) 


  8.5 FL


(7.0-11.0) 8.9 FL


(7.0-11.0)


 


Neutrophils (%) (Auto)


  86.8 %


(16.0-70.0) 


  81.7 %


(16.0-70.0) 81.7 %


(16.0-70.0)


 


Lymphocytes (%) (Auto)


  4.2 %


(9.0-44.0) 


  8.2 %


(9.0-44.0) 7.6 %


(9.0-44.0)


 


Monocytes (%) (Auto)


  8.9 %


(0.0-8.0) 


  8.2 %


(0.0-8.0) 8.2 %


(0.0-8.0)


 


Eosinophils (%) (Auto)


  0.1 %


(0.0-4.0) 


  1.6 %


(0.0-4.0) 2.3 %


(0.0-4.0)


 


Basophils (%) (Auto)


  0.0 %


(0.0-2.0) 


  0.3 %


(0.0-2.0) 0.2 %


(0.0-2.0)


 


Neutrophils # (Auto)


  7.7 TH/MM3


(1.8-7.7) 


  7.0 TH/MM3


(1.8-7.7) 5.8 TH/MM3


(1.8-7.7)


 


Lymphocytes # (Auto)


  0.4 TH/MM3


(1.0-4.8) 


  0.7 TH/MM3


(1.0-4.8) 0.5 TH/MM3


(1.0-4.8)


 


Monocytes # (Auto)


  0.8 TH/MM3


(0-0.9) 


  0.7 TH/MM3


(0-0.9) 0.6 TH/MM3


(0-0.9)


 


Eosinophils # (Auto)


  0.0 TH/MM3


(0-0.4) 


  0.1 TH/MM3


(0-0.4) 0.2 TH/MM3


(0-0.4)


 


Basophils # (Auto)


  0.0 TH/MM3


(0-0.2) 


  0.0 TH/MM3


(0-0.2) 0.0 TH/MM3


(0-0.2)


 


CBC Comment AUTO DIFF   DIFF FINAL  DIFF FINAL 


 


Differential Comment


  AUTO DIFF


CONFIRMED 


   


   


 


 


Blood Urea Nitrogen


  38 MG/DL


(7-18) 


  32 MG/DL


(7-18) 24 MG/DL


(7-18)


 


Creatinine


  1.86 MG/DL


(0.60-1.30) 


  1.78 MG/DL


(0.60-1.30) 1.50 MG/DL


(0.60-1.30)


 


Random Glucose


  126 MG/DL


() 


  103 MG/DL


() 113 MG/DL


()


 


Total Protein


  5.5 GM/DL


(6.4-8.2) 


  


  


 


 


Albumin


  3.0 GM/DL


(3.4-5.0) 


  


  2.8 GM/DL


(3.4-5.0)


 


Calcium Level


  7.6 MG/DL


(8.5-10.1) 


  8.0 MG/DL


(8.5-10.1) 8.1 MG/DL


(8.5-10.1)


 


Phosphorus Level


  3.9 MG/DL


(2.5-4.9) 


  


  2.7 MG/DL


(2.5-4.9)


 


Magnesium Level


  2.1 MG/DL


(1.5-2.5) 


  


  2.3 MG/DL


(1.5-2.5)


 


Alkaline Phosphatase


  60 U/L


() 


  


  


 


 


Aspartate Amino Transf


(AST/SGOT) 85 U/L (15-37) 


  


  


  


 


 


Alanine Aminotransferase


(ALT/SGPT) 28 U/L (12-78) 


  


  


  


 


 


Total Bilirubin


  0.8 MG/DL


(0.2-1.0) 


  


  


 


 


Sodium Level


  139 MEQ/L


(136-145) 


  139 MEQ/L


(136-145) 138 MEQ/L


(136-145)


 


Potassium Level


  4.1 MEQ/L


(3.5-5.1) 


  4.3 MEQ/L


(3.5-5.1) 4.3 MEQ/L


(3.5-5.1)


 


Chloride Level


  107 MEQ/L


() 


  106 MEQ/L


() 106 MEQ/L


()


 


Carbon Dioxide Level


  23.0 MEQ/L


(21.0-32.0) 


  25.4 MEQ/L


(21.0-32.0) 23.5 MEQ/L


(21.0-32.0)


 


Anion Gap 9 MEQ/L (5-15)   8 MEQ/L (5-15)  9 MEQ/L (5-15) 


 


Estimat Glomerular Filtration


Rate 35 ML/MIN


(>89) 


  37 ML/MIN


(>89) 45 ML/MIN


(>89)





.


Result Diagram:  


4/26/18 0647                                                                   

             4/26/18 0647





Imaging





Last Impressions








GI Bleed Scan Nuclear Medicine 4/24/18 0000 Signed





Impressions: 





 Service Date/Time:  Tuesday, April 24, 2018 14:42 - CONCLUSION: No findings of 





 active GI bleed.     Gal Mayfield MD 


 


Abdomen/Pelvis CT 4/22/18 1947 Signed





Impressions: 





 Service Date/Time:  Sunday, April 22, 2018 20:13 - CONCLUSION:  1. No acute 





 finding is identified to explain the clinical symptoms. 2. Nonacute findings 





 include cholelithiasis, severe atherosclerotic disease, and prostatomegaly.   

  





 Adi Niño MD 


 


Chest X-Ray 4/22/18 1733 Signed





Impressions: 





 Service Date/Time:  Sunday, April 22, 2018 17:50 - CONCLUSION:  Underinflation 





 with atelectasis at the lung bases. Otherwise, no acute cardiopulmonary 





 abnormality is identified.     Adi Niño MD 





.





Assessment and Plan


Disease Oriented Problem List:  


(1) NSTEMI (non-ST elevated myocardial infarction)


Comment:  Quite elevated tropoinin and CK. 


.





(2) Aortic stenosis


Comment:  Severe





(3) Ischemic cardiomyopathy


(4) JOSIE (acute kidney injury)


(5) GI bleed


Comment:  Unkown location.





(6) Severe anemia


(7) Diabetes mellitus


Symptom Scale:  


(1) Pain


0-10 Scale:  Unable to quantify


Comment:  Pain:  Patient has reported intermittent pressure-type chest pain 

that does not radiate to neck or arms.  It does not appear to be worse with 

exertion but is more likely to occur when he is anemic.  It seem to improve 

after he passes gas. Not present today.  Also developed burning -type pain 

centered in groin area where catheter was inserted for bleeding scan.  Burning 

pain was intermittent and at times seemed to radiate to abdomen.  Would 

disappear on own.  No known mitigating or exacerbating factors.  Imporving.


* 





(2) Generalized weakness


0-10 Scale:  Unable to quantify


Comment:  Generalized weakness:  the generalized weakness appears to be most 

intense when hemoglobin is low and improves with transfusion. 


.





(3) Nausea


0-10 Scale:  Unable to quantify


Comment:  Was severe in days leading up to this admission.  Now mostly 

resolved. 


.





(4) Constipation


0-10 Scale:  Unable to quantify


Comment:  This is perrenial problem for the patient. 


.





(5) Anxiety


0-10 Scale:  Unable to quantify


Comment:  Anxiety became apparent on 04/25/18. Focus of his anxiety was that 

something had gone wrong with the bleeding scan and the procedure itself might 

be causing bleeding.


.





(6) Dyspnea


0-10 Scale:  Unable to quantify


Comment:  Appears comfortable at rest.  Becomes tachypnic with conversation or 

exertion.


.





Pertinent Non-Medical Issues


Psychosocial:  Well supported locally by wife and son with whom he lives.  

Other children in [place]


Spiritual:  Judaism background.  Not a member of a local parish. 


Legal:  No advance directives.  


Ethical issues impacting care:  Patient is capacitated to make his own health 

care decisions.


.


Important Contacts


* Shahnaz Cho  (spouse and verbally designated health care surrogate)  [phone]


* Amor Cho (son that lives with patient)   [phone]


.


Prognosis


Patient has severe aortic stenosis (and just had a non-STEMI) and per patient, 

the cardiologist has explained he will likely not live a year without a new 

valve.  However, he is not felt to be a reasonable surgical candidate for 

either open heart or TAVR valve surgery.  Given his bleeding, he will likely 

have to remain off anti-coagulants.  In the meantime, patient has a recurrent 

GI bleed from an unknown source.   Unless the bleeding stops on its own or can 

be stopped by a low risk procedure, bleeding will likely continue.  The 

combination of his cardiac conditions and GI bleeding make life expectancy 

likely 6 months or less. 





Patient is eligible for hospice care at such time that goals are primarily 

comfort oriented.


.


Code Status:  No Code


Plan


==  Code Status:  Patient has opted for NO CODE status. This decision was made 

with his wife and son at the bedside and participating in the discussion on 04/ 24/18.





==  Decision Making:  Patient is capacitated to make his own health care 

decisions.  He has verbally stated that he wants his spouse to serve as health 

care surrogate should he become incapacitated. 





==  Goals of medical treatment:  Patient wants DNR status.  Now that bleeding 

appears to be stopped, he is open to hospice care. 





==  Symptoms:


* Pain:  Patient reports intermittent pressure-type chest pain that does not 

radiate to neck or arms.  It does not appear to be worse with exertion but is 

more likely to occur when he is anemic.  It seem to improve after he passes 

gas. Also developed burning -type pain centered in groin area where catheter 

was inserted for bleeding scan.  Burning pain was intermittent and at times 

seemed to radiate to abdomen.  Would disappear on own.  No known mitigating or 

exacerbating factors. 


* Generalized weakness:  the generalized weakness appears to be most intense 

when hemoglobin is low and improves with transfusion. 


* Dyspnea:  Gets some dyspnea with exertion at home but is not on 02 there.  

Dyspnea managed in hospital with nasal cannula 02.  Appears mostly comfortable 

at rest, but gets tachypnic with conversation or exertion. 


* Nausea:  Improved .


* Constipation:  Chronic problem for patient.  


* Anxiety:  Anxiety became apparent on 04/25/18. Focus of his anxiety was that 

something had gone wrong with the bleeding scan and the procedure itself might 

be causing bleeding.





==  Plan:


* Add lactulose 30 cc qd


* Since Hg is stable and there is no obvious ongoing bleeding and patient is 

felt to be a high risk for any procedures at this time, will arrange for 

Hospice consult with plans to discharge home or to care center on 04/27/18 if 

Hg remains stable.   Patient would be eligible for care center to try and get 

better management of constipation, anxiety, dyspnea, and groin pain prior to 

going home. 


* Will recommend low dose scheduled lorazepam under hospice care to address 

anxiety. 








==  Palliative care will continue to follow patient to assist with symptom 

management and to further clarify goals of medical treatment as the clinical 

course evolves. 





==  Case discussed with Dr. Cedeño and bedside nurse and hospice admissions 

team.


.





Attestation


To help prompt me to consider important information that might be impacting 

today's encounter and assessment, information from prior notes written by 

myself or my colleagues may have been "brought forward" into today's note.  My 

signature on this note, however, is an attestation that I personally performed 

the exam, history, and/or decision-making noted today, and, unless otherwise 

indicated, the interactions with patient, family, and staff as well as the 

review of records all occurred today.  I also attest that the listed assessment 

and stated plan reflect my best clinical judgment today based on the 

combination of historical information, prior notes, and today's exam/ 

interactions.  When time spent is documented, it refers only to time spent 

today by the signer, or if indicated, combined time spent today by 

collaborating physician/nurse practitioner.


.











Charan Gallego MD Apr 26, 2018 15:19

## 2018-04-26 NOTE — HHI.GIFU
Subjective


Remarks


Pt resting in bed in NAD.  Loves the salmon here, would like a beer.  Less 

anxious today.  No obvious bleeding.  No BM.


 (Rupal Bauer)





Objective


Vitals I&O





Vital Signs








  Date Time  Temp Pulse Resp B/P (MAP) Pulse Ox O2 Delivery O2 Flow Rate FiO2


 


4/26/18 09:33     98   21


 


4/26/18 08:00  83      


 


4/26/18 08:00 98.6 83 24 105/61 (76) 97   


 


4/26/18 04:00  77      


 


4/26/18 04:00 98.0 77 17 109/62 (78) 100   


 


4/26/18 00:00 98.2 90 17 116/64 (81) 100   


 


4/26/18 00:00  90      


 


4/25/18 22:00  97      


 


4/25/18 21:41     98 Nasal Cannula 2.00 


 


4/25/18 20:00  107      


 


4/25/18 20:00 98.1 107 18 115/73 (87) 100   


 


4/25/18 18:00  97      


 


4/25/18 16:00  80      


 


4/25/18 16:00 98.0 80 25 110/69 (83) 100   


 


4/25/18 14:00  84      


 


4/25/18 12:00 98.2 101 25 100/85 (90) 98   


 


4/25/18 12:00  101      














I/O      


 


 4/25/18 4/25/18 4/25/18 4/26/18 4/26/18 4/26/18





 07:00 15:00 23:00 07:00 15:00 23:00


 


Intake Total 200 ml 1080 ml 1680 ml 1240 ml  


 


Output Total 500 ml  725 ml 650 ml  


 


Balance -300 ml 1080 ml 955 ml 590 ml  


 


      


 


Intake Oral 200 ml  1680 ml 240 ml  


 


IV Total  1080 ml  1000 ml  


 


Output Urine Total 500 ml  725 ml 650 ml  


 


Stool Total 0 ml  0 ml 0 ml  








Laboratory





Laboratory Tests








Test


  4/26/18


06:47


 


White Blood Count 7.1 


 


Red Blood Count 3.42 


 


Hemoglobin 9.6 


 


Hematocrit 29.0 


 


Mean Corpuscular Volume 84.9 


 


Mean Corpuscular Hemoglobin 28.2 


 


Mean Corpuscular Hemoglobin


Concent 33.2 


 


 


Red Cell Distribution Width 19.1 


 


Platelet Count 193 


 


Mean Platelet Volume 8.9 


 


Neutrophils (%) (Auto) 81.7 


 


Lymphocytes (%) (Auto) 7.6 


 


Monocytes (%) (Auto) 8.2 


 


Eosinophils (%) (Auto) 2.3 


 


Basophils (%) (Auto) 0.2 


 


Neutrophils # (Auto) 5.8 


 


Lymphocytes # (Auto) 0.5 


 


Monocytes # (Auto) 0.6 


 


Eosinophils # (Auto) 0.2 


 


Basophils # (Auto) 0.0 


 


CBC Comment DIFF FINAL 


 


Differential Comment  


 


Blood Urea Nitrogen 24 


 


Creatinine 1.50 


 


Random Glucose 113 


 


Albumin 2.8 


 


Calcium Level 8.1 


 


Phosphorus Level 2.7 


 


Magnesium Level 2.3 


 


Sodium Level 138 


 


Potassium Level 4.3 


 


Chloride Level 106 


 


Carbon Dioxide Level 23.5 


 


Anion Gap 9 


 


Estimat Glomerular Filtration


Rate 45 


 








Imaging





Last Impressions








GI Bleed Scan Nuclear Medicine 4/24/18 0000 Signed





Impressions: 





 Service Date/Time:  Tuesday, April 24, 2018 14:42 - CONCLUSION: No findings of 





 active GI bleed.     Gal Mayfield MD 


 


Abdomen/Pelvis CT 4/22/18 1947 Signed





Impressions: 





 Service Date/Time:  Sunday, April 22, 2018 20:13 - CONCLUSION:  1. No acute 





 finding is identified to explain the clinical symptoms. 2. Nonacute findings 





 include cholelithiasis, severe atherosclerotic disease, and prostatomegaly.   

  





 Adi Niño MD 


 


Chest X-Ray 4/22/18 1733 Signed





Impressions: 





 Service Date/Time:  Sunday, April 22, 2018 17:50 - CONCLUSION:  Underinflation 





 with atelectasis at the lung bases. Otherwise, no acute cardiopulmonary 





 abnormality is identified.     Adi Niño MD 








Physical Exam


HEENT: PERRL; normocephalic; atraumatic; no jaundice.  


CHEST:  CTA  mild SOB to conversation


CARDIAC:  RRR + murmur


ABDOMEN:  Soft, nondistended, nontender; no hepatosplenomegaly; bowel sounds 

are present in all four quadrants.


EXTREMITIES: No clubbing, cyanosis, or edema.


SKIN:  Normal; no rash; no jaundice.


CNS:  No focal deficits; alert and oriented times three.


 (Rupal Bauer)





Assessment and Plan


Plan


ASSESSMENT


- anemia, black tarry stool - poss UGIB location unclear.  had EGD and 

colonoscopy 2/24/18 no bleeding seen


   scheduled for outpt CE today.  ASA and plavix held.  per cardiology he is 

high risk for procedure but its "not


   totally unreasonable."   CT showed no acute findings


-NSTEMI - cardiology following.  needs TAVR but not good candidate and would 

need anemia resolved





4/24/18 after episode of chest pain this  am, cardiology has declared pt "ultra 

high risk" for procedures and that may be 


 "reasonable" to proceed with endoscopy if he is stable and pain free.  will 

hold for now.  HH continues to trend down despite


  transfusions.  NO obvious bleeding, pt denies rectal bleeding.  palliative 

care consulted.





4/25/18  HH is stable today.  bleed scan was neg.  palliative care now 

following.  has had increasing troponins.


    denies chest pain today.  d/w palliative care.  ?hospice


4/26/18 HH relatively stable.  no obvious GI bleeding.  tolerating diet, less 

anxious today, making jokes.  high risk per cardiology





PLAN


- soft diet


- prn stool softener, laxative


- await cardiology input


- no EGD at this time


- inpt EGD when more stable vs outpt capsule endoscopy, pt is not inclined to 

pursue EGD but may consider CE


- monitor labs


- transfuse as needed


[place] [facility] [place]


- notify GI of active bleeding


 


pt seen by myself and Dr Cedeño and this note is on his behalf


 (Rupal Bauer)


Physician Comments


Patient seen and examined


Agree with above


Continue with current supportive care


Monitor labs


Consideration is made for hospice at this point


Case discussed briefly with Dr. Gallego


Not much to add from a GI perspective we will sign off


 (Sim Cedeño MD)











Rupal Bauer Apr 26, 2018 10:59


Sim Cedeño MD Apr 26, 2018 21:11

## 2018-04-26 NOTE — HHI.HCPN
Patient more comfortable.  Hg stable.  


As patient is at high risk for further procedures and Hg is stable with no 

obvious active bleeding, I have consulted hospice with plan to send home under 

hospice care as early as 04/27/18 if Hg remains stable and pt remains 

comfortable.


Full note pending.











Charan Gallego MD Apr 26, 2018 14:45

## 2018-04-26 NOTE — HHI.PR
Subjective


Remarks


Patient says he is feeling well.  Denies any chest pain or shortness of breath.





Objective





Vital Signs








  Date Time  Temp Pulse Resp B/P (MAP) Pulse Ox O2 Delivery O2 Flow Rate FiO2


 


4/26/18 23:23     98   


 


4/26/18 20:13     95   


 


4/26/18 20:00  93      


 


4/26/18 20:00 99.6 93 30 97/54 (68) 94   


 


4/26/18 16:00  96      


 


4/26/18 16:00 97.0 96 30 117/81 (93) 94   


 


4/26/18 12:00  92      


 


4/26/18 12:00 97.8 92 24 115/61 (79) 96   


 


4/26/18 09:33     98   21


 


4/26/18 08:00  83      


 


4/26/18 08:00 98.6 83 24 105/61 (76) 97   


 


4/26/18 04:00  77      


 


4/26/18 04:00 98.0 77 17 109/62 (78) 100   


 


4/26/18 00:00 98.2 90 17 116/64 (81) 100   


 


4/26/18 00:00  90      














I/O      


 


 4/26/18 4/26/18 4/26/18 4/27/18 4/27/18 4/27/18





 07:00 15:00 23:00 07:00 15:00 23:00


 


Intake Total 1240 ml  3251 ml   


 


Output Total 650 ml  1000 ml   


 


Balance 590 ml  2251 ml   


 


      


 


Intake Oral 240 ml  650 ml   


 


IV Total 1000 ml  2601 ml   


 


Output Urine Total 650 ml  1000 ml   


 


Stool Total 0 ml     








Result Diagram:  


4/26/18 0647                                                                   

             4/26/18 0647





Objective Remarks


GENERAL: patient lying in bed.  Appears comfortable.


SKIN: Warm and dry.no change on exam


HEAD: Normocephalic.


EYES: No scleral icterus. No injection or drainage. 


NECK: Supple, trachea midline. No JVD or lymphadenopathy.


CARDIOVASCULAR: Regular rate and rhythm without murmurs, gallops, or rubs. 


RESPIRATORY: Breath sounds equal bilaterally. No accessory muscle use.


GASTROINTESTINAL: Abdomen soft, non-tender, nondistended. 


MUSCULOSKELETAL: No cyanosis, or edema. 


BACK: Nontender without obvious deformity. No CVA tenderness.








A/P


Assessment and Plan


========4/26/18. =======


Discussed with palliative care.  Patient might go with hospice tomorrow.  

Stable hemoglobin.





=====================








1.  GI bleed, symptomatic anemia: H&H decreased again this morning.  Transfuse 

2 units PRBCs.  Monitor H&H closely.  Continue IV Protonix drip.  Appreciate GI 

recommendations.  Patient was scheduled for capsule endoscopy as an outpatient 

today.  N.p.o. for procedure, however procedure has been cancelled as the 

patient is too unstable at this time.


= 4/25.  Bleeding scan was negative.  Hemoglobin is stable for 1 day.  Continue 

to monitor.


=4/26.  Hemoglobin 9.1.  Stable off anticoagulation.


2.  Non-ST elevation MI: Likely secondary to severe anemia.  Patient has known 

history of coronary artery disease.  Status post cardiac catheterization on 4/1/ 18, which showed severe aortic stenosis with total occlusion of nondominant 

right coronary artery and 50% stenosis of the distal circumflex.  Medical 

management was recommended at that time.  Appreciate cardiology 

recommendations.  Aspirin, Plavix on hold secondary to GI bleed.  No 

anticoagulation at this time secondary to bleeding.  Beta-blocker on hold.


= Cardiology following.  Appreciate assistance.


3.  Hypertension: Blood pressure acceptable.  Lisinopril, Coreg on hold.


4.  Hyperlipidemia: Not on medications.


5.  Diabetes mellitus: Monitor Accu-Cheks and cover with sliding scale insulin.


6.  Acute kidney injury superimposed on chronic kidney disease stage III: 

Gentle IV fluid hydration.  Monitor BUN and creatinine.


7.  DVT prophylaxis: SCDs.  Chemical prophylaxis on hold secondary to GI bleed.


8.  Severe aortic stenosis: Not a candidate for surgery at this time.


9.  Consult palliative care.  Patient's prognosis is guarded.


Discharge Planning


transfer to [facility]


We'll need cardiology and gastroenterology clearance.











Jignesh Burr MD Apr 26, 2018 23:56

## 2018-04-27 VITALS
HEART RATE: 91 BPM | DIASTOLIC BLOOD PRESSURE: 63 MMHG | RESPIRATION RATE: 24 BRPM | SYSTOLIC BLOOD PRESSURE: 101 MMHG | TEMPERATURE: 98.1 F | OXYGEN SATURATION: 95 %

## 2018-04-27 VITALS
DIASTOLIC BLOOD PRESSURE: 67 MMHG | RESPIRATION RATE: 27 BRPM | OXYGEN SATURATION: 99 % | HEART RATE: 84 BPM | SYSTOLIC BLOOD PRESSURE: 97 MMHG | TEMPERATURE: 98 F

## 2018-04-27 VITALS
DIASTOLIC BLOOD PRESSURE: 65 MMHG | OXYGEN SATURATION: 99 % | RESPIRATION RATE: 31 BRPM | SYSTOLIC BLOOD PRESSURE: 123 MMHG | HEART RATE: 87 BPM | TEMPERATURE: 98.2 F

## 2018-04-27 VITALS
SYSTOLIC BLOOD PRESSURE: 111 MMHG | HEART RATE: 79 BPM | RESPIRATION RATE: 23 BRPM | TEMPERATURE: 97.8 F | OXYGEN SATURATION: 95 % | DIASTOLIC BLOOD PRESSURE: 65 MMHG

## 2018-04-27 VITALS
RESPIRATION RATE: 31 BRPM | DIASTOLIC BLOOD PRESSURE: 70 MMHG | OXYGEN SATURATION: 95 % | SYSTOLIC BLOOD PRESSURE: 121 MMHG | HEART RATE: 83 BPM | TEMPERATURE: 98.7 F

## 2018-04-27 VITALS
TEMPERATURE: 97.6 F | HEART RATE: 90 BPM | OXYGEN SATURATION: 98 % | DIASTOLIC BLOOD PRESSURE: 62 MMHG | RESPIRATION RATE: 21 BRPM | SYSTOLIC BLOOD PRESSURE: 107 MMHG

## 2018-04-27 VITALS — OXYGEN SATURATION: 98 %

## 2018-04-27 RX ADMIN — INSULIN ASPART SCH: 100 INJECTION, SOLUTION INTRAVENOUS; SUBCUTANEOUS at 16:58

## 2018-04-27 RX ADMIN — FUROSEMIDE SCH MG: 20 TABLET ORAL at 09:05

## 2018-04-27 RX ADMIN — INSULIN ASPART SCH: 100 INJECTION, SOLUTION INTRAVENOUS; SUBCUTANEOUS at 21:00

## 2018-04-27 RX ADMIN — CLOTRIMAZOLE SCH APPLIC: 10 CREAM TOPICAL at 21:27

## 2018-04-27 RX ADMIN — INSULIN ASPART SCH: 100 INJECTION, SOLUTION INTRAVENOUS; SUBCUTANEOUS at 12:00

## 2018-04-27 RX ADMIN — PHENYTOIN SODIUM SCH MLS/HR: 50 INJECTION INTRAMUSCULAR; INTRAVENOUS at 16:59

## 2018-04-27 RX ADMIN — PANTOPRAZOLE SCH MG: 40 TABLET, DELAYED RELEASE ORAL at 09:05

## 2018-04-27 RX ADMIN — STANDARDIZED SENNA CONCENTRATE AND DOCUSATE SODIUM SCH TAB: 8.6; 5 TABLET, FILM COATED ORAL at 21:00

## 2018-04-27 RX ADMIN — WATER SCH ML: 1 IRRIGANT IRRIGATION at 09:05

## 2018-04-27 RX ADMIN — Medication SCH ML: at 09:00

## 2018-04-27 RX ADMIN — INSULIN ASPART SCH: 100 INJECTION, SOLUTION INTRAVENOUS; SUBCUTANEOUS at 08:00

## 2018-04-27 RX ADMIN — CHLORHEXIDINE GLUCONATE SCH PACK: 500 CLOTH TOPICAL at 04:00

## 2018-04-27 RX ADMIN — Medication SCH ML: at 21:27

## 2018-04-27 NOTE — HHI.HCPN
Reason for visit


   a.  To assist with evaluation and management of symptoms including:  

generalized weakness;  dyspnea, pruritis in the heel.


   b.  To assist medical decision maker(s) with: better understanding of 

current medical conditions; weighing benefits/burdens of medical treatment 

options; making  medical treatment decisions.


.





Subjective/Interval History


Awake, alert, and conversant at time of my visit.  





He maintain he feel less weak today.  He denies any dyspnea, and said "I feel 

fine."  His only complaint today is itchness at his heels.





I met with pt's son, pt wife, and patient.  Hospice was also present. Again 

went through his underlying condition.  Discussed his severe aortic stenosis (

and just had a non-STEMI) and per patient, the cardiologist has explained he 

will likely not live a year without a new valve.  However, he is not felt to be 

a reasonable surgical candidate for either open heart or TAVR valve surgery.  

Given his bleeding, he will likely have to remain off anti-coagulants.





Hospice was there to clarify concept of hospice.  He thought hospice was a 

place where people goes in and does not come out.   I told him hospice is a 

service that can go to his house.  Spent 25 mins answering questions regarding 

quality of life, hospice vs rehab .. etc.  Pt had expressed he wants 

rehabilitation at home where his attending physician could order it.  He also 

seem to still want blood products if he were to have a GI bleed again.  I did 

explain to patient his weakness his not just due to the anemia, but also his 

heart and the valve.





He said Okay let me think about it again.  Hospice said they will follow up 

tomorrow.








.


Family/friend interactions


please see hpi.  pt's wife and son present.





Advance Directives


Living Will:  Never completed


Health Care Surrogate:  Never completed


Durable Power of :  Never completed


Advance Directive Specifics


Date completed:


Patient never completed an advance directive.


.


Health Care Surrogate(s):


Patient has verbally indicated that he would want his wife to serve as HCS.


.


Documented care wishes:


No written documentation of health care goals/preferences.


.





Objective





Vital Signs








  Date Time  Temp Pulse Resp B/P (MAP) Pulse Ox O2 Delivery O2 Flow Rate FiO2


 


4/27/18 12:00 97.6 90 21 107/62 (77) 98   


 


4/27/18 12:00  90      


 


4/27/18 08:00  79      


 


4/27/18 08:00 97.8 79 23 111/65 (80) 95   


 


4/27/18 04:00  82      


 


4/27/18 04:00 98.7 83 31 121/70 (87) 95   


 


4/27/18 00:00  86      


 


4/27/18 00:00 98.1 91 24 101/63 (76) 95   


 


4/26/18 23:23     98   


 


4/26/18 20:13     95   


 


4/26/18 20:00  93      


 


4/26/18 20:00 99.6 93 30 97/54 (68) 94   














Intake & Output  


 


 4/27/18 4/27/18





 07:00 19:00


 


Intake Total 1240 ml 


 


Output Total 250 ml 


 


Balance 990 ml 


 


  


 


Intake Oral 240 ml 


 


IV Total 1000 ml 


 


Output Urine Total 250 ml 


 


# Bowel Movements 0 








Physical Exam


CONSTITUTIONAL/GENERAL: This is an adequately nourished patient, awake, alert, 

conversant in and MICU bed .  He appears jovial with his family at bedside.


TUBES/LINES/DRAINS:  Peripheral IV;  


SKIN: right and left achilles no erythema, fluctance or induration, some 

dryness. Skin temperature appropriate. Not diaphoretic. Ecchymosis noted on 

medial-posterior area of left calf. 


EYES: Pupils equal and round. Extraocular motions intact. No scleral icterus. 

No injection or drainage. Fundi not examined.


ENT: Hearing grossly normal. Nose without bleeding or purulent drainage. Throat 

without visible erythema, exudates, masses, or lesions.  Edentulous. 


NECK: Trachea midline. Supple


CARDIOVASCULAR: Regular rate in low 100s and  regular rhythm without murmurs, 

gallops, or rubs. No JVD. 


RESPIRATORY/CHEST: Symmetric, unlabored respirations. Clear to auscultation. 

Breath sounds equal bilaterally. No wheezes, rales, or rhonchi.  


GASTROINTESTINAL: Abdomen soft, non-tender, nondistended. No hepato-splenomegaly

, or palpable masses. No guarding. Bowel sounds present. Right groin exam (site 

of his burning sensation) remains unremarkable. 


GENITOURINARY: Without palpable bladder distension. 


MUSCULOSKELETAL: Extremities without clubbing, cyanosis.  No pedal edema. No 

calf tenderness. No mottling.  Ecchymosis noted on medial-posterior area of 

left calf. 


LYMPHATICS: Not examined. 


NEUROLOGICAL: Awake and alert. Motor and sensory grossly within normal limits. 

Follows commands. Cognitively sharp. Moves all extremities.


PSYCHIATRIC:  not anxious this visit.  No obvious depression. No apparent 

hallucinations or other psychotic thought process.


.





Diagnostic Tests


Laboratory





Laboratory Tests








Test


  4/24/18


17:57 4/25/18


02:38 4/26/18


06:47


 


Hemoglobin


  10.0 GM/DL


(13.0-17.0) 9.9 GM/DL


(13.0-17.0) 9.6 GM/DL


(13.0-17.0)


 


Hematocrit


  28.8 %


(39.0-51.0) 30.3 %


(39.0-51.0) 29.0 %


(39.0-51.0)


 


White Blood Count


  


  8.6 TH/MM3


(4.0-11.0) 7.1 TH/MM3


(4.0-11.0)


 


Red Blood Count


  


  3.58 MIL/MM3


(4.50-5.90) 3.42 MIL/MM3


(4.50-5.90)


 


Mean Corpuscular Volume


  


  84.6 FL


(80.0-100.0) 84.9 FL


(80.0-100.0)


 


Mean Corpuscular Hemoglobin


  


  27.8 PG


(27.0-34.0) 28.2 PG


(27.0-34.0)


 


Mean Corpuscular Hemoglobin


Concent 


  32.9 %


(32.0-36.0) 33.2 %


(32.0-36.0)


 


Red Cell Distribution Width


  


  18.7 %


(11.6-17.2) 19.1 %


(11.6-17.2)


 


Platelet Count


  


  227 TH/MM3


(150-450) 193 TH/MM3


(150-450)


 


Mean Platelet Volume


  


  8.5 FL


(7.0-11.0) 8.9 FL


(7.0-11.0)


 


Neutrophils (%) (Auto)


  


  81.7 %


(16.0-70.0) 81.7 %


(16.0-70.0)


 


Lymphocytes (%) (Auto)


  


  8.2 %


(9.0-44.0) 7.6 %


(9.0-44.0)


 


Monocytes (%) (Auto)


  


  8.2 %


(0.0-8.0) 8.2 %


(0.0-8.0)


 


Eosinophils (%) (Auto)


  


  1.6 %


(0.0-4.0) 2.3 %


(0.0-4.0)


 


Basophils (%) (Auto)


  


  0.3 %


(0.0-2.0) 0.2 %


(0.0-2.0)


 


Neutrophils # (Auto)


  


  7.0 TH/MM3


(1.8-7.7) 5.8 TH/MM3


(1.8-7.7)


 


Lymphocytes # (Auto)


  


  0.7 TH/MM3


(1.0-4.8) 0.5 TH/MM3


(1.0-4.8)


 


Monocytes # (Auto)


  


  0.7 TH/MM3


(0-0.9) 0.6 TH/MM3


(0-0.9)


 


Eosinophils # (Auto)


  


  0.1 TH/MM3


(0-0.4) 0.2 TH/MM3


(0-0.4)


 


Basophils # (Auto)


  


  0.0 TH/MM3


(0-0.2) 0.0 TH/MM3


(0-0.2)


 


CBC Comment  DIFF FINAL  DIFF FINAL 


 


Differential Comment     


 


Blood Urea Nitrogen


  


  32 MG/DL


(7-18) 24 MG/DL


(7-18)


 


Creatinine


  


  1.78 MG/DL


(0.60-1.30) 1.50 MG/DL


(0.60-1.30)


 


Random Glucose


  


  103 MG/DL


() 113 MG/DL


()


 


Calcium Level


  


  8.0 MG/DL


(8.5-10.1) 8.1 MG/DL


(8.5-10.1)


 


Sodium Level


  


  139 MEQ/L


(136-145) 138 MEQ/L


(136-145)


 


Potassium Level


  


  4.3 MEQ/L


(3.5-5.1) 4.3 MEQ/L


(3.5-5.1)


 


Chloride Level


  


  106 MEQ/L


() 106 MEQ/L


()


 


Carbon Dioxide Level


  


  25.4 MEQ/L


(21.0-32.0) 23.5 MEQ/L


(21.0-32.0)


 


Anion Gap  8 MEQ/L (5-15)  9 MEQ/L (5-15) 


 


Estimat Glomerular Filtration


Rate 


  37 ML/MIN


(>89) 45 ML/MIN


(>89)


 


Albumin


  


  


  2.8 GM/DL


(3.4-5.0)


 


Phosphorus Level


  


  


  2.7 MG/DL


(2.5-4.9)


 


Magnesium Level


  


  


  2.3 MG/DL


(1.5-2.5)








Result Diagram:  


4/26/18 0647                                                                   

             4/26/18 0647





Imaging





Last Impressions








GI Bleed Scan Nuclear Medicine 4/24/18 0000 Signed





Impressions: 





 Service Date/Time:  Tuesday, April 24, 2018 14:42 - CONCLUSION: No findings of 





 active GI bleed.     Gal Mayfield MD 


 


Abdomen/Pelvis CT 4/22/18 1947 Signed





Impressions: 





 Service Date/Time:  Sunday, April 22, 2018 20:13 - CONCLUSION:  1. No acute 





 finding is identified to explain the clinical symptoms. 2. Nonacute findings 





 include cholelithiasis, severe atherosclerotic disease, and prostatomegaly.   

  





 Adi Niño MD 


 


Chest X-Ray 4/22/18 1733 Signed





Impressions: 





 Service Date/Time:  Sunday, April 22, 2018 17:50 - CONCLUSION:  Underinflation 





 with atelectasis at the lung bases. Otherwise, no acute cardiopulmonary 





 abnormality is identified.     Adi Niño MD 











Assessment and Plan


Disease Oriented Problem List:  


(1) NSTEMI (non-ST elevated myocardial infarction)


Comment:  Quite elevated tropoinin and CK. 


.





(2) Aortic stenosis


Comment:  Severe





(3) Ischemic cardiomyopathy


(4) JOSIE (acute kidney injury)


(5) GI bleed


Comment:  Unkown location.





(6) Severe anemia


(7) Diabetes mellitus


(8) Pruritus


Symptom Scale:  


(1) Pain


0-10 Scale:  Unable to quantify





(2) Generalized weakness


0-10 Scale:  Unable to quantify


Comment:  Generalized weakness: improved s/p transfusion.





(3) Constipation


0-10 Scale:  Unable to quantify


Comment:  This is perrenial problem for the patient. 


.





(4) Anxiety


0-10 Scale:  0


Comment:  not anxious today.





(5) Dyspnea


0-10 Scale:  Unable to quantify


Comment:  Appears comfortable at rest.  Becomes tachypnic with conversation or 

exertion.


.





(6) Pruritic dermatitis


0-10 Scale:  Unable to quantify


Comment:  likely fungal,  may be contact dermatitis.  start topical antifungal.





Pertinent Non-Medical Issues


Psychosocial:  Well supported locally by wife and son with whom he lives.  

Other children in [place]


Spiritual:  [employer]regation background.  Not a member of a local parish. 


Legal:  No advance directives.  


Ethical issues impacting care:  Patient is capacitated to make his own health 

care decisions.


.


Important Contacts


* Shahnaz Cho  (spouse and verbally designated health care surrogate)  [phone]


* Amor Cho (son that lives with patient)   [phone]


.


Prognosis


Patient has severe aortic stenosis (and just had a non-STEMI) and per patient, 

the cardiologist has explained he will likely not live a year without a new 

valve.  However, he is not felt to be a reasonable surgical candidate for 

either open heart or TAVR valve surgery.  Given his bleeding, he will likely 

have to remain off anti-coagulants.  In the meantime, patient has a recurrent 

GI bleed from an unknown source.   Unless the bleeding stops on its own or can 

be stopped by a low risk procedure, bleeding will likely continue.  The 

combination of his cardiac conditions and GI bleeding make life expectancy 

likely 6 months or less. 





Patient is eligible for hospice care at such time that goals are primarily 

comfort oriented.


.


Code Status:  No Code


Plan


==  Code Status:  Patient has opted for NO CODE status. This decision was made 

with his wife and son at the bedside and participating in the discussion on 04/ 24/18.





==  Decision Making:  Patient is capacitated to make his own health care 

decisions.  He has verbally stated that he wants his spouse to serve as health 

care surrogate should he become incapacitated. 





==  Goals of medical treatment:  I met with pt's son, pt's wife, and patient.  

Hospice was also present. Again went through his underlying condition.  

Discussed his severe aortic stenosis (and just had a non-STEMI) and per patient

, the cardiologist has explained he will likely not live a year without a new 

valve.  However, he is not felt to be a reasonable surgical candidate for 

either open heart or TAVR valve surgery.  Given his bleeding, he will likely 

have to remain off anti-coagulants.





Hospice was there to clarify concept of hospice.  He thought hospice was a 

place where people goes in and does not come out.   I told him hospice is a 

service that can go to his house.  Spent 35 mins answering questions regarding 

quality of life, hospice vs rehab .. etc.  Pt had expressed he wants 

rehabilitation at home where his attending physician could order it.  He also 

seem to still want blood products if he were to have a GI bleed again.  I did 

explain to patient his weakness his not just due to the anemia, but also his 

heart and the valve.








He said Okay let me think about it again.  Hospice said they will follow up 

tomorrow.


I have spent 35 min talking with pt and family about comfort care vs rehab/

aggressive care.  Hospice will follow up again tomorrow and get his decision.  

If he decline hospice tomorrow, pt is not ready to transition to comfort 

measures.  He and family understands palliative care will not be available over 

the weekend.





==  Symptoms:


* pain- declines pain today.


* Generalized weakness:  remains weak, but he states better s/p transfusion.  

part of it is also cardiac.  no med rec.  improves with s/p transfusion. 


* Dyspnea:  Gets some dyspnea with exertion at home but is not on 02 there.  

Dyspnea managed in hospital with nasal cannula 02.  Appears mostly comfortable 

at rest, but gets tachypnic with conversation or exertion. 


* pruritis- pt endorse heels are itchy,  likely fungal.  No real rash, some 

dryness.  start topical antifungal..


* Constipation:  Chronic problem for patient.  


* Anxiety:  Not anxious today.











==  Palliative care will continue to follow patient to assist with symptom 

management and to further clarify goals of medical treatment as the clinical 

course evolves.











Escobar Mendes MD Apr 27, 2018 16:37

## 2018-04-27 NOTE — PD.CARD.PN
Subjective


Subjective Remarks


Follow up for Dr. Mason





No complaints, just weak





Objective


Medications





Current Medications








 Medications


  (Trade)  Dose


 Ordered  Sig/Ramu


 Route  Start Time


 Stop Time Status Last Admin


 


  (NS Flush)  2 ml  UNSCH  PRN


 IV FLUSH  4/22/18 20:00


     


 


 


  (NS Flush)  2 ml  BID


 IV FLUSH  4/22/18 21:00


    4/26/18 20:43


 


 


  (D50w (Vial) Inj)  50 ml  UNSCH  PRN


 IV PUSH  4/22/18 20:00


     


 


 


  (Glucagon Inj)  1 mg  UNSCH  PRN


 OTHER  4/22/18 20:00


     


 


 


  (NovoLOG


 SUPPLEMENTAL


 SCALE)  1  ACHS SLIDING  SCALE


 SQ  4/22/18 21:00


     


 


 


  (Duoneb Neb)  1 ampule  Q4HR NEB  PRN


 NEB  4/22/18 20:00


     


 


 


  (Zofran Inj)  4 mg  Q6H  PRN


 IVP  4/22/18 20:00


     


 


 


  (Tylenol)  650 mg  Q6H  PRN


 PO  4/22/18 20:00


     


 


 


  (Morphine Inj)  2 mg  Q3H  PRN


 IV PUSH  4/22/18 20:00


     


 


 


  (Narcan Inj)  0.4 mg  UNSCH  PRN


 IV PUSH  4/22/18 20:00


     


 


 


  (Protonix)  40 mg  DAILY


 PO  4/23/18 09:00


    4/27/18 09:05


 


 


 Sodium Chloride  1,000 ml @ 


 60 mls/hr  [id number]


 IV  4/22/18 21:00


    4/26/18 20:44


 


 


 Miscellaneous


 Information  Patient in


 critical care


 unit?  Ass...  [id number]


 .XX  4/22/18 21:00


    4/22/18 21:00


 


 


  (Chlorhexidine


 2% Cloth)  3 pack  UNSCH  PRN


 TOPICAL  4/22/18 21:00


 4/27/18 20:59   


 


 


  (Lasix)  20 mg  DAILY


 PO  4/23/18 09:00


    4/27/18 09:05


 


 


  (Arielle-Colace)  1 tab  BID  PRN


 PO  4/25/18 13:15


     


 


 


  (Ativan)  0.25 mg  Q6H  PRN


 PO  4/25/18 13:15


     


 


 


  (Pill Splitter)  1 ea  UNSCH  PRN


 OTHER  4/25/18 13:15


     


 


 


  (Lactulose Liq)  30 ml  DAILY


 PO  4/26/18 18:00


    4/27/18 09:05


 








Vital Signs / I&O





Vital Signs








  Date Time  Temp Pulse Resp B/P (MAP) Pulse Ox O2 Delivery O2 Flow Rate FiO2


 


4/27/18 08:00  79      


 


4/27/18 08:00 97.8 79 23 111/65 (80) 95   


 


4/27/18 04:00  82      


 


4/27/18 04:00 98.7 83 31 121/70 (87) 95   


 


4/27/18 00:00  86      


 


4/27/18 00:00 98.1 91 24 101/63 (76) 95   


 


4/26/18 23:23     98   


 


4/26/18 20:13     95   


 


4/26/18 20:00  93      


 


4/26/18 20:00 99.6 93 30 97/54 (68) 94   


 


4/26/18 16:00  96      


 


4/26/18 16:00 97.0 96 30 117/81 (93) 94   


 


4/26/18 12:00  92      


 


4/26/18 12:00 97.8 92 24 115/61 (79) 96   














I/O      


 


 4/26/18 4/26/18 4/26/18 4/27/18 4/27/18 4/27/18





 07:00 15:00 23:00 07:00 15:00 23:00


 


Intake Total 1240 ml  3251 ml 240 ml  


 


Output Total 650 ml  1000 ml 250 ml  


 


Balance 590 ml  2251 ml -10 ml  


 


      


 


Intake Oral 240 ml  650 ml 240 ml  


 


IV Total 1000 ml  2601 ml   


 


Output Urine Total 650 ml  1000 ml 250 ml  


 


Stool Total 0 ml     


 


# Bowel Movements    0  








Physical Exam


GENERAL: NAD, AAOx3, pale appearing


SKIN: Warm and dry.


HEAD: Atraumatic. Normocephalic. 


EYES: Pupils equal and round. No scleral icterus. No injection or drainage. 


ENT: No nasal bleeding or discharge.  Mucous membranes pink and moist.


NECK: Trachea midline. No JVD. 


CARDIOVASCULAR: Regular rate and rhythm.  3/6 crescendo decrescendo to the RSB


RESPIRATORY: No accessory muscle use. Clear to auscultation. Breath sounds 

equal bilaterally. 


GASTROINTESTINAL: Abdomen soft, non-tender, nondistended. Hepatic and splenic 

margins not palpable. 


MUSCULOSKELETAL: Extremities without clubbing, cyanosis, or edema. No obvious 

deformities. 


NEUROLOGICAL: Awake and alert. No obvious cranial nerve deficits.  Motor 

grossly within normal limits. Five out of 5 muscle strength in the arms and 

legs.  Normal speech.


PSYCHIATRIC: Appropriate mood and affect; insight and judgment normal.





Assessment and Plan


Problem List:  


(1) Aortic stenosis


ICD Codes:  I35.0 - Nonrheumatic aortic (valve) stenosis


Status:  Chronic


(2) CAD (coronary artery disease)


ICD Codes:  I25.10 - Atherosclerotic heart disease of native coronary artery 

without angina pectoris


Status:  Chronic


(3) NSTEMI (non-ST elevated myocardial infarction)


ICD Codes:  I21.4 - Non-ST elevation (NSTEMI) myocardial infarction; N18.9 - 

Chronic kidney disease, unspecified


Status:  Acute


(4) GI bleed


ICD Codes:  K92.2 - Gastrointestinal hemorrhage, unspecified


Status:  Acute


(5) Severe anemia


ICD Codes:  D64.9 - Anemia, unspecified


Status:  Acute


(6) Generalized weakness


ICD Codes:  R53.1 - Weakness


Status:  Acute


Assessment and Plan


1) Severe AS


   Previous work up for TAVR, but with GI bleed will not be pursuing further


2) CAD/NSTEMI


   Not a candidate for further work


3) GI Bleed/Anemia


   No source noted


4) Patient plans to go to hospice


   No further cardiovascular work up/treatment necessary at this time





Problem Qualifiers





(1) GI bleed:  


Qualified Codes:  K92.2 - Gastrointestinal hemorrhage, unspecified








Paul Bo DO Apr 27, 2018 11:19

## 2018-04-27 NOTE — HHI.PR
Subjective


Remarks


Patient seen today around 9:30 AM.  As he is feeling all right.  Denies any 

chest pain or shortness of breath.  Denies any bleeding.





Objective





Vital Signs








  Date Time  Temp Pulse Resp B/P (MAP) Pulse Ox O2 Delivery O2 Flow Rate FiO2


 


4/27/18 16:00  84      


 


4/27/18 12:00 97.6 90 21 107/62 (77) 98   


 


4/27/18 12:00  90      


 


4/27/18 08:00  79      


 


4/27/18 08:00 97.8 79 23 111/65 (80) 95   


 


4/27/18 04:00  82      


 


4/27/18 04:00 98.7 83 31 121/70 (87) 95   


 


4/27/18 00:00  86      


 


4/27/18 00:00 98.1 91 24 101/63 (76) 95   


 


4/26/18 23:23     98   


 


4/26/18 20:13     95   


 


4/26/18 20:00  93      


 


4/26/18 20:00 99.6 93 30 97/54 (68) 94   














I/O      


 


 4/26/18 4/26/18 4/26/18 4/27/18 4/27/18 4/27/18





 07:00 15:00 23:00 07:00 15:00 23:00


 


Intake Total 1240 ml  3251 ml 240 ml  


 


Output Total 650 ml  1000 ml 250 ml  


 


Balance 590 ml  2251 ml -10 ml  


 


      


 


Intake Oral 240 ml  650 ml 240 ml  


 


IV Total 1000 ml  2601 ml   


 


Output Urine Total 650 ml  1000 ml 250 ml  


 


Stool Total 0 ml     


 


# Bowel Movements    0  








Result Diagram:  


4/26/18 0647                                                                   

             4/26/18 0647





Objective Remarks


GENERAL: patient lying in bed.  Appears comfortable.no change on exam.


SKIN: Warm and dry.no change on exam


HEAD: Normocephalic.


EYES: No scleral icterus. No injection or drainage. 


NECK: Supple, trachea midline. No JVD


CARDIOVASCULAR: Regular rate and rhythm without murmurs, gallops, or rubs. 


RESPIRATORY: Breath sounds equal bilaterally. No accessory muscle use.


GASTROINTESTINAL: Abdomen soft, non-tender, nondistended. 


MUSCULOSKELETAL: No cyanosis, or edema. 


BACK: Nontender without obvious deformity. No CVA tenderness.








A/P


Assessment and Plan


========4/27/18. =======


Patient is not sure she is ready for hospice.  No acute changes in management.  

Discussed with nursing repeat hemoglobin tomorrow.  Ordered laxatives for 

constipation.





=====================








1.  GI bleed, symptomatic anemia: H&H decreased again this morning.  Transfuse 

2 units PRBCs.  Monitor H&H closely.  Continue IV Protonix drip.  Appreciate GI 

recommendations.  Patient was scheduled for capsule endoscopy as an outpatient 

today.  N.p.o. for procedure, however procedure has been cancelled as the 

patient is too unstable at this time.


= 4/25.  Bleeding scan was negative.  Hemoglobin is stable for 1 day.  Continue 

to monitor.


=4/26.  Hemoglobin 9.1.  Stable off anticoagulation.


2.  Non-ST elevation MI: Likely secondary to severe anemia.  Patient has known 

history of coronary artery disease.  Status post cardiac catheterization on 4/1/ 18, which showed severe aortic stenosis with total occlusion of nondominant 

right coronary artery and 50% stenosis of the distal circumflex.  Medical 

management was recommended at that time.  Appreciate cardiology 

recommendations.  Aspirin, Plavix on hold secondary to GI bleed.  No 

anticoagulation at this time secondary to bleeding.  Beta-blocker on hold.


= Cardiology following.  Appreciate assistance.


3.  Hypertension: Blood pressure acceptable.  Lisinopril, Coreg on hold.


4.  Hyperlipidemia: Not on medications.


5.  Diabetes mellitus: Monitor Accu-Cheks and cover with sliding scale insulin.


6.  Acute kidney injury superimposed on chronic kidney disease stage III: 

Gentle IV fluid hydration.  Monitor BUN and creatinine.


7.  DVT prophylaxis: SCDs.  Chemical prophylaxis on hold secondary to GI bleed.


8.  Severe aortic stenosis: Not a candidate for surgery at this time.


9.  Consult palliative care.  Patient's prognosis is guarded.


Discharge Planning


transfer to [facility]


We'll need cardiology and gastroenterology clearance.


Discharge Planning


transfer to [facility]


pending further discussion with hospice











Jignesh Burr MD Apr 27, 2018 17:14

## 2018-04-28 VITALS — HEART RATE: 80 BPM

## 2018-04-28 VITALS
TEMPERATURE: 97.9 F | DIASTOLIC BLOOD PRESSURE: 60 MMHG | RESPIRATION RATE: 16 BRPM | SYSTOLIC BLOOD PRESSURE: 116 MMHG | HEART RATE: 81 BPM | OXYGEN SATURATION: 97 %

## 2018-04-28 VITALS
SYSTOLIC BLOOD PRESSURE: 120 MMHG | OXYGEN SATURATION: 97 % | RESPIRATION RATE: 20 BRPM | TEMPERATURE: 97.9 F | DIASTOLIC BLOOD PRESSURE: 69 MMHG | HEART RATE: 82 BPM

## 2018-04-28 VITALS — HEART RATE: 84 BPM

## 2018-04-28 VITALS
DIASTOLIC BLOOD PRESSURE: 70 MMHG | HEART RATE: 87 BPM | TEMPERATURE: 97.7 F | RESPIRATION RATE: 32 BRPM | SYSTOLIC BLOOD PRESSURE: 123 MMHG | OXYGEN SATURATION: 96 %

## 2018-04-28 VITALS
HEART RATE: 86 BPM | OXYGEN SATURATION: 98 % | DIASTOLIC BLOOD PRESSURE: 77 MMHG | SYSTOLIC BLOOD PRESSURE: 134 MMHG | TEMPERATURE: 97.8 F

## 2018-04-28 VITALS
HEART RATE: 85 BPM | DIASTOLIC BLOOD PRESSURE: 73 MMHG | TEMPERATURE: 97.8 F | RESPIRATION RATE: 16 BRPM | SYSTOLIC BLOOD PRESSURE: 124 MMHG | OXYGEN SATURATION: 97 %

## 2018-04-28 VITALS — HEART RATE: 75 BPM

## 2018-04-28 VITALS
HEART RATE: 91 BPM | OXYGEN SATURATION: 5 % | RESPIRATION RATE: 16 BRPM | TEMPERATURE: 98.2 F | DIASTOLIC BLOOD PRESSURE: 68 MMHG | SYSTOLIC BLOOD PRESSURE: 131 MMHG

## 2018-04-28 VITALS — HEART RATE: 71 BPM

## 2018-04-28 VITALS — HEART RATE: 72 BPM

## 2018-04-28 VITALS — HEART RATE: 81 BPM

## 2018-04-28 LAB
ALBUMIN SERPL-MCNC: 2.6 GM/DL (ref 3.4–5)
BASOPHILS # BLD AUTO: 0 TH/MM3 (ref 0–0.2)
BASOPHILS NFR BLD: 0.7 % (ref 0–2)
BUN SERPL-MCNC: 22 MG/DL (ref 7–18)
CALCIUM SERPL-MCNC: 8.1 MG/DL (ref 8.5–10.1)
CHLORIDE SERPL-SCNC: 106 MEQ/L (ref 98–107)
CREAT SERPL-MCNC: 1.21 MG/DL (ref 0.6–1.3)
EOSINOPHIL # BLD: 0.2 TH/MM3 (ref 0–0.4)
EOSINOPHIL NFR BLD: 3.7 % (ref 0–4)
ERYTHROCYTE [DISTWIDTH] IN BLOOD BY AUTOMATED COUNT: 19.3 % (ref 11.6–17.2)
GFR SERPLBLD BASED ON 1.73 SQ M-ARVRAT: 58 ML/MIN (ref 89–?)
GLUCOSE SERPL-MCNC: 156 MG/DL (ref 74–106)
HCO3 BLD-SCNC: 22.6 MEQ/L (ref 21–32)
HCT VFR BLD CALC: 28.8 % (ref 39–51)
HGB BLD-MCNC: 9.6 GM/DL (ref 13–17)
LYMPHOCYTES # BLD AUTO: 0.9 TH/MM3 (ref 1–4.8)
LYMPHOCYTES NFR BLD AUTO: 13.2 % (ref 9–44)
MAGNESIUM SERPL-MCNC: 2.3 MG/DL (ref 1.5–2.5)
MCH RBC QN AUTO: 27.7 PG (ref 27–34)
MCHC RBC AUTO-ENTMCNC: 33.1 % (ref 32–36)
MCV RBC AUTO: 83.8 FL (ref 80–100)
MONOCYTE #: 0.4 TH/MM3 (ref 0–0.9)
MONOCYTES NFR BLD: 6.6 % (ref 0–8)
NEUTROPHILS # BLD AUTO: 4.9 TH/MM3 (ref 1.8–7.7)
NEUTROPHILS NFR BLD AUTO: 75.8 % (ref 16–70)
PHOSPHATE SERPL-MCNC: 2.3 MG/DL (ref 2.5–4.9)
PLATELET # BLD: 178 TH/MM3 (ref 150–450)
PMV BLD AUTO: 7.9 FL (ref 7–11)
RBC # BLD AUTO: 3.44 MIL/MM3 (ref 4.5–5.9)
SODIUM SERPL-SCNC: 138 MEQ/L (ref 136–145)
WBC # BLD AUTO: 6.5 TH/MM3 (ref 4–11)

## 2018-04-28 RX ADMIN — CLOTRIMAZOLE SCH APPLIC: 10 CREAM TOPICAL at 20:39

## 2018-04-28 RX ADMIN — STANDARDIZED SENNA CONCENTRATE AND DOCUSATE SODIUM SCH TAB: 8.6; 5 TABLET, FILM COATED ORAL at 20:40

## 2018-04-28 RX ADMIN — Medication SCH ML: at 08:36

## 2018-04-28 RX ADMIN — INSULIN ASPART SCH: 100 INJECTION, SOLUTION INTRAVENOUS; SUBCUTANEOUS at 08:00

## 2018-04-28 RX ADMIN — INSULIN ASPART SCH: 100 INJECTION, SOLUTION INTRAVENOUS; SUBCUTANEOUS at 12:00

## 2018-04-28 RX ADMIN — INSULIN ASPART SCH: 100 INJECTION, SOLUTION INTRAVENOUS; SUBCUTANEOUS at 17:00

## 2018-04-28 RX ADMIN — WATER SCH ML: 1 IRRIGANT IRRIGATION at 08:33

## 2018-04-28 RX ADMIN — STANDARDIZED SENNA CONCENTRATE AND DOCUSATE SODIUM SCH TAB: 8.6; 5 TABLET, FILM COATED ORAL at 08:33

## 2018-04-28 RX ADMIN — INSULIN ASPART SCH: 100 INJECTION, SOLUTION INTRAVENOUS; SUBCUTANEOUS at 20:39

## 2018-04-28 RX ADMIN — PHENYTOIN SODIUM SCH MLS/HR: 50 INJECTION INTRAMUSCULAR; INTRAVENOUS at 05:34

## 2018-04-28 RX ADMIN — PHENYTOIN SODIUM SCH MLS/HR: 50 INJECTION INTRAMUSCULAR; INTRAVENOUS at 08:37

## 2018-04-28 RX ADMIN — PANTOPRAZOLE SCH MG: 40 TABLET, DELAYED RELEASE ORAL at 08:33

## 2018-04-28 RX ADMIN — CLOTRIMAZOLE SCH APPLIC: 10 CREAM TOPICAL at 08:36

## 2018-04-28 RX ADMIN — Medication SCH ML: at 20:39

## 2018-04-28 RX ADMIN — FUROSEMIDE SCH MG: 20 TABLET ORAL at 08:33

## 2018-04-28 NOTE — HHI.PR
Subjective


Remarks


Patient says he is feeling all right.  Denies any chest pain or shortness of 

breath.  Denies nausea or vomiting.  Positive bowel movement today.





Objective





Vital Signs








  Date Time  Temp Pulse Resp B/P (MAP) Pulse Ox O2 Delivery O2 Flow Rate FiO2


 


4/28/18 11:24  91      


 


4/28/18 11:24 98.2 91 16 131/68 (89) 5   


 


4/28/18 10:00  75      


 


4/28/18 09:00  72      


 


4/28/18 08:00 97.8 85 16 124/73 (90) 97   


 


4/28/18 08:00  85      


 


4/28/18 07:00  71      


 


4/28/18 04:19  81      


 


4/28/18 04:17 97.8 86  134/77 (96) 98   


 


4/28/18 00:00 97.7 87 32 123/70 (87) 96   


 


4/28/18 00:00  81      


 


4/27/18 21:15     98   


 


4/27/18 20:00  87      


 


4/27/18 20:00 98.2 87 31 123/65 (84) 99   


 


4/27/18 16:00  84      


 


4/27/18 16:00 98.0 84 27 97/67 (77) 99   














I/O      


 


 4/27/18 4/27/18 4/27/18 4/28/18 4/28/18 4/28/18





 07:00 15:00 23:00 07:00 15:00 23:00


 


Intake Total 240 ml  450 ml 1120 ml  


 


Output Total 250 ml  750 ml 450 ml  


 


Balance -10 ml  -300 ml 670 ml  


 


      


 


Intake Oral 240 ml  450 ml 120 ml  


 


IV Total    1000 ml  


 


Output Urine Total 250 ml  750 ml 450 ml  


 


# Bowel Movements 0     








Result Diagram:  


4/28/18 0352                                                                   

             4/28/18 0352





Objective Remarks


GENERAL: patient lying in bed.  Appears comfortable.no change on exam.  Alert 

and oriented 3.


SKIN: Warm and dry.no change on exam


HEAD: Normocephalic.


EYES: No scleral icterus. No injection or drainage. 


NECK: Supple, trachea midline. No JVD


CARDIOVASCULAR: Regular rate and rhythm without murmurs, gallops, or rubs. 


RESPIRATORY: Breath sounds equal bilaterally. No accessory muscle use.


GASTROINTESTINAL: Abdomen soft, non-tender, nondistended. 


MUSCULOSKELETAL: No cyanosis, or edema. 


BACK: Nontender without obvious deformity. No CVA tenderness.








A/P


Assessment and Plan


========4/28/18. =======


Constipation resolved.  Continue to monitor


Hemoglobin stable.


Possible discharge to SNF tomorrow.


=====================








1.  GI bleed, symptomatic anemia: H&H decreased again this morning.  Transfuse 

2 units PRBCs.  Monitor H&H closely.  Continue IV Protonix drip.  Appreciate GI 

recommendations.  Patient was scheduled for capsule endoscopy as an outpatient 

today.  N.p.o. for procedure, however procedure has been cancelled as the 

patient is too unstable at this time.


= 4/25.  Bleeding scan was negative.  Hemoglobin is stable for 1 day.  Continue 

to monitor.


=4/26.  Hemoglobin 9.1.  Stable off anticoagulation.


2.  Non-ST elevation MI: Likely secondary to severe anemia.  Patient has known 

history of coronary artery disease.  Status post cardiac catheterization on 4/1/ 18, which showed severe aortic stenosis with total occlusion of nondominant 

right coronary artery and 50% stenosis of the distal circumflex.  Medical 

management was recommended at that time.  Appreciate cardiology 

recommendations.  Aspirin, Plavix on hold secondary to GI bleed.  No 

anticoagulation at this time secondary to bleeding.  Beta-blocker on hold.


= Cardiology following.  Appreciate assistance.


3.  Hypertension: Blood pressure acceptable.  Lisinopril, Coreg on hold.


4.  Hyperlipidemia: Not on medications.


5.  Diabetes mellitus: Monitor Accu-Cheks and cover with sliding scale insulin.


6.  Acute kidney injury superimposed on chronic kidney disease stage III: 

Gentle IV fluid hydration.  Monitor BUN and creatinine.


7.  DVT prophylaxis: SCDs.  Chemical prophylaxis on hold secondary to GI bleed.


8.  Severe aortic stenosis: Not a candidate for surgery at this time.


9.  Consult palliative care.  Patient's prognosis is guarded.


Discharge Planning


transfer to [facility].


Hospice following, number patient is not sure.


PT recommends rehab.  Possible discharge to rehab tomorrow.











Jignesh Burr MD Apr 28, 2018 13:45

## 2018-04-28 NOTE — PD.CARD.PN
Subjective


Subjective Remarks


Follow up for Dr. Mason





No complaints, just weak





No chest pain





Objective


Medications





Current Medications








 Medications


  (Trade)  Dose


 Ordered  Sig/Ramu


 Route  Start Time


 Stop Time Status Last Admin


 


  (NS Flush)  2 ml  UNSCH  PRN


 IV FLUSH  4/22/18 20:00


     


 


 


  (NS Flush)  2 ml  BID


 IV FLUSH  4/22/18 21:00


    4/28/18 08:36


 


 


  (D50w (Vial) Inj)  50 ml  UNSCH  PRN


 IV PUSH  4/22/18 20:00


     


 


 


  (Glucagon Inj)  1 mg  UNSCH  PRN


 OTHER  4/22/18 20:00


     


 


 


  (NovoLOG


 SUPPLEMENTAL


 SCALE)  1  ACHS SLIDING  SCALE


 SQ  4/22/18 21:00


     


 


 


  (Duoneb Neb)  1 ampule  Q4HR NEB  PRN


 NEB  4/22/18 20:00


     


 


 


  (Zofran Inj)  4 mg  Q6H  PRN


 IVP  4/22/18 20:00


     


 


 


  (Tylenol)  650 mg  Q6H  PRN


 PO  4/22/18 20:00


     


 


 


  (Morphine Inj)  2 mg  Q3H  PRN


 IV PUSH  4/22/18 20:00


     


 


 


  (Narcan Inj)  0.4 mg  UNSCH  PRN


 IV PUSH  4/22/18 20:00


     


 


 


  (Protonix)  40 mg  DAILY


 PO  4/23/18 09:00


    4/28/18 08:33


 


 


 Sodium Chloride  1,000 ml @ 


 60 mls/hr  [id number]


 IV  4/22/18 21:00


    4/28/18 08:37


 


 


 Miscellaneous


 Information  Patient in


 critical care


 unit?  Ass...  [id number]


 .XX  4/22/18 21:00


    4/22/18 21:00


 


 


  (Lasix)  20 mg  DAILY


 PO  4/23/18 09:00


    4/28/18 08:33


 


 


  (Ativan)  0.25 mg  Q6H  PRN


 PO  4/25/18 13:15


     


 


 


  (Pill Splitter)  1 ea  UNSCH  PRN


 OTHER  4/25/18 13:15


     


 


 


  (Lactulose Liq)  30 ml  DAILY


 PO  4/26/18 18:00


    4/28/18 08:33


 


 


  (Lotrimin 1%


 Cream)  1 applic  BID


 TOPICAL  4/27/18 21:00


 4/30/18 20:59  4/28/18 08:36


 


 


  (Arielle-Colace)  1 tab  BID


 PO  4/27/18 21:00


    4/28/18 08:33


 








Vital Signs / I&O





Vital Signs








  Date Time  Temp Pulse Resp B/P (MAP) Pulse Ox O2 Delivery O2 Flow Rate FiO2


 


4/28/18 11:24  91      


 


4/28/18 11:24 98.2 91 16 131/68 (89) 5   


 


4/28/18 10:00  75      


 


4/28/18 09:00  72      


 


4/28/18 08:00 97.8 85 16 124/73 (90) 97   


 


4/28/18 08:00  85      


 


4/28/18 07:00  71      


 


4/28/18 04:19  81      


 


4/28/18 04:17 97.8 86  134/77 (96) 98   


 


4/28/18 00:00 97.7 87 32 123/70 (87) 96   


 


4/28/18 00:00  81      


 


4/27/18 21:15     98   


 


4/27/18 20:00  87      


 


4/27/18 20:00 98.2 87 31 123/65 (84) 99   


 


4/27/18 16:00  84      


 


4/27/18 16:00 98.0 84 27 97/67 (77) 99   


 


4/27/18 12:00 97.6 90 21 107/62 (77) 98   


 


4/27/18 12:00  90      














I/O      


 


 4/27/18 4/27/18 4/27/18 4/28/18 4/28/18 4/28/18





 07:00 15:00 23:00 07:00 15:00 23:00


 


Intake Total 240 ml  450 ml 1120 ml  


 


Output Total 250 ml  750 ml 450 ml  


 


Balance -10 ml  -300 ml 670 ml  


 


      


 


Intake Oral 240 ml  450 ml 120 ml  


 


IV Total    1000 ml  


 


Output Urine Total 250 ml  750 ml 450 ml  


 


# Bowel Movements 0     








Physical Exam


GENERAL: NAD, AAOx3, pale appearing


SKIN: Warm and dry.


HEAD: Atraumatic. Normocephalic. 


EYES: Pupils equal and round. No scleral icterus. No injection or drainage. 


ENT: No nasal bleeding or discharge.  Mucous membranes pink and moist.


NECK: Trachea midline. No JVD. 


CARDIOVASCULAR: Regular rate and rhythm.  3/6 crescendo decrescendo to the RSB


RESPIRATORY: No accessory muscle use. Clear to auscultation. Breath sounds 

equal bilaterally. 


GASTROINTESTINAL: Abdomen soft, non-tender, nondistended. Hepatic and splenic 

margins not palpable. 


MUSCULOSKELETAL: Extremities without clubbing, cyanosis, or edema. No obvious 

deformities. 


NEUROLOGICAL: Awake and alert. No obvious cranial nerve deficits.  Motor 

grossly within normal limits. Five out of 5 muscle strength in the arms and 

legs.  Normal speech.


PSYCHIATRIC: Appropriate mood and affect; insight and judgment normal.


Laboratory





Laboratory Tests








Test


  4/28/18


03:52


 


White Blood Count 6.5 TH/MM3 


 


Red Blood Count 3.44 MIL/MM3 


 


Hemoglobin 9.6 GM/DL 


 


Hematocrit 28.8 % 


 


Mean Corpuscular Volume 83.8 FL 


 


Mean Corpuscular Hemoglobin 27.7 PG 


 


Mean Corpuscular Hemoglobin


Concent 33.1 % 


 


 


Red Cell Distribution Width 19.3 % 


 


Platelet Count 178 TH/MM3 


 


Mean Platelet Volume 7.9 FL 


 


Neutrophils (%) (Auto) 75.8 % 


 


Lymphocytes (%) (Auto) 13.2 % 


 


Monocytes (%) (Auto) 6.6 % 


 


Eosinophils (%) (Auto) 3.7 % 


 


Basophils (%) (Auto) 0.7 % 


 


Neutrophils # (Auto) 4.9 TH/MM3 


 


Lymphocytes # (Auto) 0.9 TH/MM3 


 


Monocytes # (Auto) 0.4 TH/MM3 


 


Eosinophils # (Auto) 0.2 TH/MM3 


 


Basophils # (Auto) 0.0 TH/MM3 


 


CBC Comment DIFF FINAL 


 


Differential Comment  


 


Blood Urea Nitrogen 22 MG/DL 


 


Creatinine 1.21 MG/DL 


 


Random Glucose 156 MG/DL 


 


Albumin 2.6 GM/DL 


 


Calcium Level 8.1 MG/DL 


 


Phosphorus Level 2.3 MG/DL 


 


Magnesium Level 2.3 MG/DL 


 


Sodium Level 138 MEQ/L 


 


Potassium Level 4.4 MEQ/L 


 


Chloride Level 106 MEQ/L 


 


Carbon Dioxide Level 22.6 MEQ/L 


 


Anion Gap 9 MEQ/L 


 


Estimat Glomerular Filtration


Rate 58 ML/MIN 


 











Assessment and Plan


Problem List:  


(1) Aortic stenosis


ICD Codes:  I35.0 - Nonrheumatic aortic (valve) stenosis


Status:  Chronic


(2) CAD (coronary artery disease)


ICD Codes:  I25.10 - Atherosclerotic heart disease of native coronary artery 

without angina pectoris


Status:  Chronic


(3) NSTEMI (non-ST elevated myocardial infarction)


ICD Codes:  I21.4 - Non-ST elevation (NSTEMI) myocardial infarction; N18.9 - 

Chronic kidney disease, unspecified


Status:  Acute


(4) GI bleed


ICD Codes:  K92.2 - Gastrointestinal hemorrhage, unspecified


Status:  Acute


(5) Severe anemia


ICD Codes:  D64.9 - Anemia, unspecified


Status:  Acute


(6) Generalized weakness


ICD Codes:  R53.1 - Weakness


Status:  Acute


Assessment and Plan


1) Severe AS


   Previous work up for TAVR, but with GI bleed will not be pursuing further


2) CAD/NSTEMI


   Not a candidate for further work up


3) GI Bleed/Anemia


   No source noted


4) Patient plans to go to hospice


   No further cardiovascular work up/treatment necessary at this time


   Will see PRN call with questions





Problem Qualifiers





(1) GI bleed:  


Qualified Codes:  K92.2 - Gastrointestinal hemorrhage, unspecified








Paul Bo DO Apr 28, 2018 11:50

## 2018-04-29 VITALS
HEART RATE: 89 BPM | DIASTOLIC BLOOD PRESSURE: 68 MMHG | RESPIRATION RATE: 18 BRPM | OXYGEN SATURATION: 97 % | SYSTOLIC BLOOD PRESSURE: 111 MMHG | TEMPERATURE: 97.9 F

## 2018-04-29 VITALS
DIASTOLIC BLOOD PRESSURE: 69 MMHG | OXYGEN SATURATION: 98 % | TEMPERATURE: 97.4 F | SYSTOLIC BLOOD PRESSURE: 113 MMHG | RESPIRATION RATE: 20 BRPM | HEART RATE: 76 BPM

## 2018-04-29 VITALS
HEART RATE: 91 BPM | OXYGEN SATURATION: 96 % | RESPIRATION RATE: 18 BRPM | SYSTOLIC BLOOD PRESSURE: 131 MMHG | DIASTOLIC BLOOD PRESSURE: 75 MMHG | TEMPERATURE: 97.9 F

## 2018-04-29 VITALS
TEMPERATURE: 97.7 F | DIASTOLIC BLOOD PRESSURE: 65 MMHG | SYSTOLIC BLOOD PRESSURE: 114 MMHG | OXYGEN SATURATION: 97 % | HEART RATE: 72 BPM | RESPIRATION RATE: 20 BRPM

## 2018-04-29 VITALS
OXYGEN SATURATION: 98 % | SYSTOLIC BLOOD PRESSURE: 121 MMHG | TEMPERATURE: 98 F | RESPIRATION RATE: 16 BRPM | DIASTOLIC BLOOD PRESSURE: 70 MMHG | HEART RATE: 88 BPM

## 2018-04-29 VITALS
HEART RATE: 93 BPM | TEMPERATURE: 97.3 F | DIASTOLIC BLOOD PRESSURE: 82 MMHG | RESPIRATION RATE: 18 BRPM | OXYGEN SATURATION: 98 % | SYSTOLIC BLOOD PRESSURE: 137 MMHG

## 2018-04-29 VITALS — HEART RATE: 72 BPM

## 2018-04-29 RX ADMIN — CARVEDILOL SCH MG: 3.12 TABLET, FILM COATED ORAL at 09:38

## 2018-04-29 RX ADMIN — ACETAMINOPHEN PRN MG: 325 TABLET ORAL at 23:58

## 2018-04-29 RX ADMIN — INSULIN ASPART SCH: 100 INJECTION, SOLUTION INTRAVENOUS; SUBCUTANEOUS at 20:45

## 2018-04-29 RX ADMIN — WATER SCH ML: 1 IRRIGANT IRRIGATION at 09:37

## 2018-04-29 RX ADMIN — INSULIN ASPART SCH: 100 INJECTION, SOLUTION INTRAVENOUS; SUBCUTANEOUS at 08:00

## 2018-04-29 RX ADMIN — PANTOPRAZOLE SCH MG: 40 TABLET, DELAYED RELEASE ORAL at 09:38

## 2018-04-29 RX ADMIN — FUROSEMIDE SCH MG: 20 TABLET ORAL at 09:37

## 2018-04-29 RX ADMIN — INSULIN ASPART SCH: 100 INJECTION, SOLUTION INTRAVENOUS; SUBCUTANEOUS at 17:00

## 2018-04-29 RX ADMIN — INSULIN ASPART SCH: 100 INJECTION, SOLUTION INTRAVENOUS; SUBCUTANEOUS at 12:00

## 2018-04-29 RX ADMIN — CLOTRIMAZOLE SCH APPLIC: 10 CREAM TOPICAL at 20:45

## 2018-04-29 RX ADMIN — STANDARDIZED SENNA CONCENTRATE AND DOCUSATE SODIUM SCH TAB: 8.6; 5 TABLET, FILM COATED ORAL at 20:44

## 2018-04-29 RX ADMIN — CLOTRIMAZOLE SCH APPLIC: 10 CREAM TOPICAL at 09:38

## 2018-04-29 RX ADMIN — CARVEDILOL SCH MG: 3.12 TABLET, FILM COATED ORAL at 20:45

## 2018-04-29 RX ADMIN — PHENYTOIN SODIUM SCH MLS/HR: 50 INJECTION INTRAMUSCULAR; INTRAVENOUS at 03:36

## 2018-04-29 RX ADMIN — STANDARDIZED SENNA CONCENTRATE AND DOCUSATE SODIUM SCH TAB: 8.6; 5 TABLET, FILM COATED ORAL at 09:38

## 2018-04-29 RX ADMIN — Medication SCH ML: at 09:00

## 2018-04-29 RX ADMIN — Medication SCH ML: at 20:45

## 2018-04-29 NOTE — RADRPT
EXAM DATE/TIME:  04/29/2018 10:43 

 

[facility] COMPARISON:     

CT ABDOMEN & PELVIS W/O CONTRAST, April 22, 2018, 20:13.

 

                     

INDICATIONS :     

Constipation.

                     

 

MEDICAL HISTORY :     

Hypertension.  Diabetes mellitus type II.  Myocardial infarction.   Congestive heart failure.    

 

SURGICAL HISTORY :     

Coronary artery stent.   

 

ENCOUNTER:     

Subsequent                                        

 

ACUITY:     

2 weeks      

 

PAIN SCORE:     

0/10

 

LOCATION:       

Abdomen.

 

FINDINGS:     

Supine and upright views of the abdomen were performed.  The abdominal bowel gas pattern is normal.  
No air fluid levels are seen.  No abnormal masses, calcifications, or organomegaly is seen.  The visu
alized lower lungs are clear.  No evidence of free intraperitoneal gas.  The osseous structures are u
nremarkable.

 

CONCLUSION:     No acute disease.  

 

 

 

 Giovany Strauss MD on April 29, 2018 at 10:59           

Board Certified Radiologist.

 This report was verified electronically.

## 2018-04-29 NOTE — HHI.PR
Subjective


Remarks


complains of constipation- states had 2 small pellet like hard stools- feels 

stools impacted "it's there, won't come out"


no abdominal pain, nausea or vomiting





Objective


Vitals





Vital Signs








  Date Time  Temp Pulse Resp B/P (MAP) Pulse Ox O2 Delivery O2 Flow Rate FiO2


 


4/29/18 08:00 97.9 91 18 131/75 (93) 96   


 


4/29/18 04:00 97.4 76 20 113/69 (84) 98   


 


4/29/18 04:00      Room Air  


 


4/29/18 03:52  72      


 


4/29/18 00:00  72      


 


4/29/18 00:00      Room Air  


 


4/29/18 00:00 97.7 79 20 114/65 (81) 97   


 


4/28/18 20:00 97.9 81 20 120/69 (86) 97   


 


4/28/18 20:00      Room Air  


 


4/28/18 20:00  82      


 


4/28/18 17:00  84      


 


4/28/18 16:00 97.9 78 16 116/60 (78) 97   


 


4/28/18 16:00  81      


 


4/28/18 15:00  80      


 


4/28/18 14:00  80      


 


4/28/18 13:00  80      


 


4/28/18 12:00  84      


 


4/28/18 11:24  91      


 


4/28/18 11:24 98.2 91 16 131/68 (89) 5   


 


4/28/18 10:00  75      














I/O      


 


 4/28/18 4/28/18 4/28/18 4/29/18 4/29/18 4/29/18





 07:00 15:00 23:00 07:00 15:00 23:00


 


Intake Total 1120 ml  800 ml 1240 ml  


 


Output Total 450 ml  600 ml 600 ml  


 


Balance 670 ml  200 ml 640 ml  


 


      


 


Intake Oral 120 ml  800 ml 240 ml  


 


IV Total 1000 ml   1000 ml  


 


Output Urine Total 450 ml  600 ml 600 ml  


 


# Bowel Movements   2 0  








Result Diagram:  


4/28/18 0352                                                                   

             4/28/18 0352





Imaging





Last Impressions








GI Bleed Scan Nuclear Medicine 4/24/18 0000 Signed





Impressions: 





 Service Date/Time:  Tuesday, April 24, 2018 14:42 - CONCLUSION: No findings of 





 active GI bleed.     Gal Mayfield MD 


 


Abdomen/Pelvis CT 4/22/18 1947 Signed





Impressions: 





 Service Date/Time:  Sunday, April 22, 2018 20:13 - CONCLUSION:  1. No acute 





 finding is identified to explain the clinical symptoms. 2. Nonacute findings 





 include cholelithiasis, severe atherosclerotic disease, and prostatomegaly.   

  





 Adi Niño MD 


 


Chest X-Ray 4/22/18 9653 Signed





Impressions: 





 Service Date/Time:  Sunday, April 22, 2018 17:50 - CONCLUSION:  Underinflation 





 with atelectasis at the lung bases. Otherwise, no acute cardiopulmonary 





 abnormality is identified.     Adi Niño MD 








Objective Remarks


awake and alert, no acute distress


anciteric


few rales,  no wheezes


regular rhythm


abdomen soft, nontender, good bowel sounds


extremities no edema


Procedures


None





A/P


Assessment and Plan


77 years old male








GI bleed, symptomatic anemia: S/P   Transfuse 2 units PRBCs.  Monito H and H


- change to po PPI


-GI ff


= 4/25.  Bleeding scan was negative.  Hemoglobin is stable for 1 day.  Continue 

to monitor.


=4/26.  Hemoglobin 9.1.  Stable off anticoagulation.


consitpation- main complain


- d/w staff nurse- do manual disimpaction


- give stool softeners as ordered


Non-ST elevation MI: Likely secondary to severe anemia.  Patient has known 

history of coronary artery disease.


Cardiomyopathy EF 30-35%  


-Status post cardiac catheterization on 4/1/18, which showed severe aortic 

stenosis with total occlusion of nondominant right coronary artery and 50% 

stenosis of the distal circumflex.  Medical management was recommended at that 

time.  - Appreciate cardiology recommendations.  Aspirin, Plavix on hold 

secondary to GI bleed.  No anticoagulation at this time secondary to bleeding. 


-increase activity- out of bed to chair


- get walk test


-Cardiology following.  Appreciate assistance.


Hypertension: Blood pressure acceptable.  Lisinopril, Coreg on hold.- restart 

meds gradually and monitor. 


   restart coreg today  and monitor 4/29


Hyperlipidemia: Not on medications.


Diabetes mellitus: Monitor Accu-Cheks and cover with sliding scale insulin.


Acute kidney injury superimposed on chronic kidney disease stage III: Gentle IV 

fluid hydration.  Monitor BUN and creatinine.


DVT prophylaxis: SCDs.  Chemical prophylaxis on hold secondary to GI bleed.


Severe aortic stenosis: Not a candidate for surgery at this time.


Consult palliative care.  Patient's prognosis is guarded.








Discharge Planning





Hospice following, number patient is not sure.unclear from previous notes plan- 

whether Hospice or shorterm SNF


PT recommends rehab. possible SNF


CM consult-











Amisha Peña MD Apr 29, 2018 09:16

## 2018-04-29 NOTE — RADRPT
EXAM DATE/TIME:  04/29/2018 10:38 

 

[facility] COMPARISON:     

CHEST SINGLE AP, April 22, 2018, 17:50.

 

                     

INDICATIONS :     

Shortness of breath.

                     

 

MEDICAL HISTORY :     

Hypertension.  Diabetes mellitus type II.  Myocardial infarction.   Congestive heart failure.   

 

SURGICAL HISTORY :     

Coronary artery stent.   

 

ENCOUNTER:     

Subsequent                                        

 

ACUITY:     

1 week      

 

PAIN SCORE:     

0/10

 

LOCATION:     

Bilateral chest 

 

FINDINGS:     

There is cardiomegaly and mild diffuse interstitial prominence with no consolidation or effusion. Oss
eous structures are intact.

 

CONCLUSION:     

Mild interstitial prominence is new from previous study.

 

 

 

 Giovany Strauss MD on April 29, 2018 at 10:59           

Board Certified Radiologist.

 This report was verified electronically.

## 2018-04-30 VITALS
RESPIRATION RATE: 18 BRPM | SYSTOLIC BLOOD PRESSURE: 111 MMHG | DIASTOLIC BLOOD PRESSURE: 66 MMHG | OXYGEN SATURATION: 98 % | HEART RATE: 75 BPM | TEMPERATURE: 97.7 F

## 2018-04-30 VITALS
OXYGEN SATURATION: 98 % | DIASTOLIC BLOOD PRESSURE: 67 MMHG | SYSTOLIC BLOOD PRESSURE: 112 MMHG | TEMPERATURE: 97.8 F | RESPIRATION RATE: 18 BRPM | HEART RATE: 70 BPM

## 2018-04-30 VITALS
RESPIRATION RATE: 18 BRPM | SYSTOLIC BLOOD PRESSURE: 120 MMHG | DIASTOLIC BLOOD PRESSURE: 68 MMHG | HEART RATE: 74 BPM | OXYGEN SATURATION: 98 % | TEMPERATURE: 97.1 F

## 2018-04-30 VITALS
HEART RATE: 75 BPM | SYSTOLIC BLOOD PRESSURE: 103 MMHG | TEMPERATURE: 98.7 F | RESPIRATION RATE: 18 BRPM | DIASTOLIC BLOOD PRESSURE: 60 MMHG | OXYGEN SATURATION: 98 %

## 2018-04-30 VITALS
RESPIRATION RATE: 18 BRPM | SYSTOLIC BLOOD PRESSURE: 137 MMHG | OXYGEN SATURATION: 98 % | DIASTOLIC BLOOD PRESSURE: 64 MMHG | HEART RATE: 71 BPM | TEMPERATURE: 97.9 F

## 2018-04-30 VITALS
DIASTOLIC BLOOD PRESSURE: 65 MMHG | SYSTOLIC BLOOD PRESSURE: 111 MMHG | OXYGEN SATURATION: 100 % | HEART RATE: 69 BPM | RESPIRATION RATE: 17 BRPM | TEMPERATURE: 97.6 F

## 2018-04-30 VITALS — HEART RATE: 67 BPM

## 2018-04-30 VITALS
OXYGEN SATURATION: 98 % | RESPIRATION RATE: 18 BRPM | SYSTOLIC BLOOD PRESSURE: 119 MMHG | TEMPERATURE: 98.7 F | HEART RATE: 70 BPM | DIASTOLIC BLOOD PRESSURE: 63 MMHG

## 2018-04-30 VITALS — HEART RATE: 66 BPM

## 2018-04-30 VITALS — HEART RATE: 68 BPM

## 2018-04-30 VITALS — HEART RATE: 70 BPM

## 2018-04-30 RX ADMIN — CARVEDILOL SCH MG: 3.12 TABLET, FILM COATED ORAL at 22:00

## 2018-04-30 RX ADMIN — INSULIN ASPART SCH: 100 INJECTION, SOLUTION INTRAVENOUS; SUBCUTANEOUS at 17:00

## 2018-04-30 RX ADMIN — CLOTRIMAZOLE SCH APPLIC: 10 CREAM TOPICAL at 09:45

## 2018-04-30 RX ADMIN — Medication SCH ML: at 09:43

## 2018-04-30 RX ADMIN — FUROSEMIDE SCH MG: 20 TABLET ORAL at 09:42

## 2018-04-30 RX ADMIN — CARVEDILOL SCH MG: 3.12 TABLET, FILM COATED ORAL at 09:41

## 2018-04-30 RX ADMIN — STANDARDIZED SENNA CONCENTRATE AND DOCUSATE SODIUM SCH TAB: 8.6; 5 TABLET, FILM COATED ORAL at 09:00

## 2018-04-30 RX ADMIN — INSULIN ASPART SCH: 100 INJECTION, SOLUTION INTRAVENOUS; SUBCUTANEOUS at 12:00

## 2018-04-30 RX ADMIN — Medication SCH ML: at 22:05

## 2018-04-30 RX ADMIN — INSULIN ASPART SCH: 100 INJECTION, SOLUTION INTRAVENOUS; SUBCUTANEOUS at 08:00

## 2018-04-30 RX ADMIN — ACETAMINOPHEN PRN MG: 325 TABLET ORAL at 05:31

## 2018-04-30 RX ADMIN — INSULIN ASPART SCH: 100 INJECTION, SOLUTION INTRAVENOUS; SUBCUTANEOUS at 21:00

## 2018-04-30 RX ADMIN — STANDARDIZED SENNA CONCENTRATE AND DOCUSATE SODIUM SCH TAB: 8.6; 5 TABLET, FILM COATED ORAL at 22:01

## 2018-04-30 RX ADMIN — WATER SCH ML: 1 IRRIGANT IRRIGATION at 09:00

## 2018-04-30 RX ADMIN — PANTOPRAZOLE SCH MG: 40 TABLET, DELAYED RELEASE ORAL at 09:42

## 2018-04-30 NOTE — HHI.PR
Subjective


Remarks


patient appears more comfortable, good po and very interactive


d/w him - rehab-


had a good BM yesterday-





Objective


Vitals





Vital Signs








  Date Time  Temp Pulse Resp B/P (MAP) Pulse Ox O2 Delivery O2 Flow Rate FiO2


 


4/30/18 08:06 97.8 70 18 112/67 (82) 98   


 


4/30/18 04:00      Room Air  


 


4/30/18 04:00 97.7 73 18 111/66 (81) 98   


 


4/30/18 04:00  75      


 


4/30/18 00:00      Room Air  


 


4/30/18 00:00 98.7 80 18 103/60 (74) 98   


 


4/30/18 00:00  75      


 


4/29/18 20:00  88      


 


4/29/18 20:00      Room Air  


 


4/29/18 20:00 98.0 87 16 121/70 (87) 98   


 


4/29/18 16:00 97.9 89 18 111/68 (82) 97   


 


4/29/18 15:06      Room Air  


 


4/29/18 12:00  93      


 


4/29/18 12:00 97.3 96 18 137/82 (100) 98   














I/O      


 


 4/29/18 4/29/18 4/29/18 4/30/18 4/30/18 4/30/18





 07:00 15:00 23:00 07:00 15:00 23:00


 


Intake Total 1240 ml  480 ml 240 ml  


 


Output Total 600 ml  300 ml 700 ml  


 


Balance 640 ml  180 ml -460 ml  


 


      


 


Intake Oral 240 ml  480 ml 240 ml  


 


IV Total 1000 ml     


 


Output Urine Total 600 ml  300 ml 700 ml  


 


# Bowel Movements 0  2 0  








Result Diagram:  


4/28/18 0352                                                                   

             4/28/18 0352





Imaging





Last Impressions








Chest X-Ray 4/29/18 0000 Signed





Impressions: 





 Service Date/Time:  Sunday, April 29, 2018 10:38 - CONCLUSION:  Mild 





 interstitial prominence is new from previous study.     Giovany Strauss MD 


 


Abdomen X-Ray 4/29/18 0000 Signed





Impressions: 





 Service Date/Time:  Sunday, April 29, 2018 10:43 - CONCLUSION: No acute 

disease. 





       Giovany Strauss MD 


 


GI Bleed Scan Nuclear Medicine 4/24/18 0000 Signed





Impressions: 





 Service Date/Time:  Tuesday, April 24, 2018 14:42 - CONCLUSION: No findings of 





 active GI bleed.     Gal Mayfield MD 


 


Abdomen/Pelvis CT 4/22/18 1947 Signed





Impressions: 





 Service Date/Time:  Sunday, April 22, 2018 20:13 - CONCLUSION:  1. No acute 





 finding is identified to explain the clinical symptoms. 2. Nonacute findings 





 include cholelithiasis, severe atherosclerotic disease, and prostatomegaly.   

  





 Adi Niño MD 








Objective Remarks


awake and alert, no acute distress


anicteric


few rales,  no wheezes


regular rhythm


abdomen soft, nontender, good bowel sounds


extremities + ankle edema


Procedures


None





A/P


Assessment and Plan


77 years old male








GI bleed, symptomatic anemia: S/P   Transfuse 2 units PRBCs.  


   - change to po PPI


   -GI ff


   = 4/25.  Bleeding scan was negative.  Hemoglobin is stable for 1 day.  

Continue to monitor.


   =4/26.  Hemoglobin 9.1.  Stable off anticoagulation.


constipation- + BM- had a good one finally 4/29


   - give stool softeners 


Non-ST elevation MI: Likely secondary to severe anemia.  Patient has known 

history of coronary artery disease.


Cardiomyopathy EF 30-35%  


   -Status post cardiac catheterization on 4/1/18, which showed severe aortic 

stenosis with total occlusion of nondominant right coronary artery and 50% 

stenosis of the distal circumflex.  


   Medical management was recommended at    


   Appreciate cardiology recommendations.  Aspirin, Plavix on hold secondary to 

GI bleed.  No anticoagulation at this time secondary to bleeding. 


   -increase activity- out of bed to chair


   - get walk test- may qualify for home 02


   -Cardiology following.  Appreciate assistance.


   Lasix 20 mg daily. Coreg bid


Hypertension: Blood pressure acceptable.  Lisinopril, Coreg on hold.- restart 

meds gradually and monitor. 


   restarted coreg  6.25 mg po bid 4/29 . BP tolerating so far


   consider restart lisinorpil at 2.5 mg po daily next few days if continues to 

do well


Hyperlipidemia: Not on medications.


Diabetes mellitus: Monitor Accu-Cheks and cover with sliding scale insulin.


Acute kidney injury superimposed on chronic kidney disease stage III: Monitor 

BUN and creatinine.


DVT prophylaxis: SCDs.  Chemical prophylaxis on hold secondary to GI bleed.


Severe aortic stenosis: Not a candidate for surgery at this time.











Discharge Planning





Hospice following, number patient is not sure.unclear from previous notes plan- 

w


PT recommends rehab. 


CM consult-needs SNF- looking for a place for him- referral sent d/w Amisha Macario MD Apr 30, 2018 10:16

## 2018-05-01 VITALS — HEART RATE: 77 BPM

## 2018-05-01 VITALS — HEART RATE: 73 BPM

## 2018-05-01 VITALS
OXYGEN SATURATION: 99 % | HEART RATE: 74 BPM | TEMPERATURE: 97.8 F | DIASTOLIC BLOOD PRESSURE: 59 MMHG | RESPIRATION RATE: 20 BRPM | SYSTOLIC BLOOD PRESSURE: 119 MMHG

## 2018-05-01 VITALS — HEART RATE: 61 BPM

## 2018-05-01 VITALS
RESPIRATION RATE: 20 BRPM | HEART RATE: 65 BPM | DIASTOLIC BLOOD PRESSURE: 70 MMHG | TEMPERATURE: 97.7 F | SYSTOLIC BLOOD PRESSURE: 122 MMHG | OXYGEN SATURATION: 98 %

## 2018-05-01 VITALS
RESPIRATION RATE: 18 BRPM | OXYGEN SATURATION: 99 % | SYSTOLIC BLOOD PRESSURE: 123 MMHG | DIASTOLIC BLOOD PRESSURE: 58 MMHG | HEART RATE: 67 BPM | TEMPERATURE: 97.5 F

## 2018-05-01 VITALS
HEART RATE: 70 BPM | OXYGEN SATURATION: 98 % | TEMPERATURE: 97.4 F | DIASTOLIC BLOOD PRESSURE: 66 MMHG | SYSTOLIC BLOOD PRESSURE: 114 MMHG | RESPIRATION RATE: 20 BRPM

## 2018-05-01 VITALS — HEART RATE: 82 BPM

## 2018-05-01 VITALS — HEART RATE: 59 BPM

## 2018-05-01 RX ADMIN — PANTOPRAZOLE SCH MG: 40 TABLET, DELAYED RELEASE ORAL at 08:21

## 2018-05-01 RX ADMIN — Medication SCH ML: at 08:21

## 2018-05-01 RX ADMIN — WATER SCH ML: 1 IRRIGANT IRRIGATION at 08:21

## 2018-05-01 RX ADMIN — INSULIN ASPART SCH: 100 INJECTION, SOLUTION INTRAVENOUS; SUBCUTANEOUS at 17:00

## 2018-05-01 RX ADMIN — CARVEDILOL SCH MG: 3.12 TABLET, FILM COATED ORAL at 08:22

## 2018-05-01 RX ADMIN — INSULIN ASPART SCH: 100 INJECTION, SOLUTION INTRAVENOUS; SUBCUTANEOUS at 11:40

## 2018-05-01 RX ADMIN — FUROSEMIDE SCH MG: 20 TABLET ORAL at 08:22

## 2018-05-01 RX ADMIN — STANDARDIZED SENNA CONCENTRATE AND DOCUSATE SODIUM SCH TAB: 8.6; 5 TABLET, FILM COATED ORAL at 08:21

## 2018-05-01 RX ADMIN — INSULIN ASPART SCH: 100 INJECTION, SOLUTION INTRAVENOUS; SUBCUTANEOUS at 08:00

## 2018-05-01 NOTE — HHI.DS
__________________________________________________





Discharge Summary


Admission Date


Apr 22, 2018 at 19:35


Discharge Date:  May 1, 2018


Admitting Diagnosis





Severe anemia.  GI bleed.  NSTEMI.  Acute on chronic kidney disease





(1) Generalized weakness


ICD Code:  R53.1 - Weakness


Status:  Acute


(2) Severe anemia


ICD Code:  D64.9 - Anemia, unspecified


Status:  Acute


(3) GI bleed


ICD Code:  K92.2 - Gastrointestinal hemorrhage, unspecified


Status:  Acute


(4) Acute kidney injury superimposed on chronic kidney disease


ICD Code:  N17.9 - Acute kidney failure, unspecified; N18.9 - Chronic kidney 

disease, unspecified


Status:  Acute


(5) NSTEMI (non-ST elevated myocardial infarction)


ICD Code:  I21.4 - Non-ST elevation (NSTEMI) myocardial infarction; N18.9 - 

Chronic kidney disease, unspecified


Status:  Acute


Procedures


None


Brief History - From Admission


77-year-old male with a past medical history significant for coronary artery 

disease, diabetes mellitus, hypertension, and CHF (last echo on 4/1/18 with an 

EF of 30-35%) presents to the emergency department for the evaluation of 

anorexia with accompanying continuous nonbloody nonbilious emesis.  The patient'

s symptoms started today and he has been unable to eat or drink anything 

including water and ginger ale.  He has had constant intermittent emesis that 

is mostly clear.  He denies any chest pain or shortness of breath.  Endorses 

generalized abdominal pain.  No diarrhea.  No lateralizing signs/symptoms.  No 

fevers/chills.  Patient is severely anemic with an elevated troponin.


CBC/BMP:  


4/28/18 0352                                                                   

             4/28/18 0352





Imaging





Last Impressions








Chest X-Ray 4/29/18 0000 Signed





Impressions: 





 Service Date/Time:  Sunday, April 29, 2018 10:38 - CONCLUSION:  Mild 





 interstitial prominence is new from previous study.     Giovany Strauss MD 


 


Abdomen X-Ray 4/29/18 0000 Signed





Impressions: 





 Service Date/Time:  Sunday, April 29, 2018 10:43 - CONCLUSION: No acute 

disease. 





       Giovany Strauss MD 


 


GI Bleed Scan Nuclear Medicine 4/24/18 0000 Signed





Impressions: 





 Service Date/Time:  Tuesday, April 24, 2018 14:42 - CONCLUSION: No findings of 





 active GI bleed.     Gal Mayfield MD 


 


Abdomen/Pelvis CT 4/22/18 1947 Signed





Impressions: 





 Service Date/Time:  Sunday, April 22, 2018 20:13 - CONCLUSION:  1. No acute 





 finding is identified to explain the clinical symptoms. 2. Nonacute findings 





 include cholelithiasis, severe atherosclerotic disease, and prostatomegaly.   

  





 Adi Niño MD 








PE at Discharge


General: No acute distress.


Heart: Regular rate and rhythm.  2/6 systolic murmur.


Lungs: Clear to auscultation bilaterally. No wheezes, rales, or rhonchi. 

Breathing is nonlabored.


Abdomen: Soft, nontender, nondistended.


Extremities: 1+ bilateral ankle edema.  SCDs.


Psych: Alert and oriented.


Neuro: Normal speech.  No focal deficits noted.


Hospital Course


The patient was admitted for management of GI bleed, symptomatic anemia, non-ST 

elevation MI.  He was admitted to the intensive care unit.  Gastroenterology 

and cardiology were consulted.  Patient received transfusion of PRBCs.  The MI 

was felt to be secondary to profound anemia.  He was taken off Plavix and it 

was felt that anticoagulation was relatively contraindicated secondary to the 

GI bleed.  He was also taken off of beta blockers and ACE inhibitor secondary 

to hypotension.  Palliative care was consulted.  Patient was not felt to be a 

candidate for TAVR secondary to GI bleeding.  Gastroenterology signed off.  

Hospice was consulted.  The patient and his family decided that he should go to 

rehab at discharge.  They would consider hospice at that time.  Cardiology 

signed off and no further workup was planned.  The patient was felt to be 

stable for discharge to skilled nursing facility.


Pt Condition on Discharge:  Stable


Discharge Disposition:  Discharge to SNF


Discharge Time:  > 30 minutes


Discharge Instructions


DIET: Follow Instructions for:  Heart Healthy Diet


Speech Therapy-Diet Recommends:  Regular


Activities you can perform:  Weight Bearing as Giovany


Activities to Avoid:  Strenuous Activity


Follow up Referrals:  


SNF/MELVI/ with [facility]





New Orders:  


BASIC METABOLIC PROF - 05/01/18


CBC NO DIFF - 05/01/18





New Medications:  


Walker with Front Wheels (Walker with Front Wheels) 1 Mis Mis


EA .XX AS DIRECTED, #1 0 Refills





Clotrimazole Topical (Clotrimazole AF Topical) 1% Cream


1 APPLIC TOPICAL BID for Infection for 2 Days, TUBE





Furosemide (Furosemide) 20 Mg Tab


20 MG PO DAILY for CMP for 30 Days, #30 TAB





 


Continued Medications:  


Carvedilol (Coreg) 3.125 Mg Tab


3.125 MG PO Q12HR for Blood Pressure Management, #180 TAB





Ferrous Sulfate (Ferrous Sulfate) 325 Mg (65 Mg Iron) Tablet


325 MG PO DAILY for Nutritional Supplement, #30 TAB 0 Refills





Folic Acid (Folic Acid) 0.4 Mg Tab


400 MCG PO DAILY for Nutritional Supplement, #90 TAB 0 Refills





Pantoprazole (Pantoprazole) 40 Mg Tab


40 MG PO DAILY for Heartburn Management, #90 TAB





 


Discontinued Medications:  


Aspirin DR (Aspirin DR) 81 Mg Tabdr


81 MG PO DAILY for Blood Clot Prevention, #90 TAB





Clopidogrel (Plavix) 75 Mg Tab


75 MG PO DAILY for Blood Clot Prevention, #30 TAB





Naproxen (Naproxen) 250 Mg Tab


250 MG PO Q12H PRN for gout pain, #60 TAB


TAKE WITH FOOD














Kendall Fitzpatrick MD May 1, 2018 18:13

## 2018-05-01 NOTE — HHI.DCPOC
Discharge Care Plan


Diagnosis:  


(1) Generalized weakness


(2) NSTEMI (non-ST elevated myocardial infarction)


(3) Acute kidney injury superimposed on chronic kidney disease


(4) GI bleed


(5) Severe anemia


Goals to Promote Your Health


* To prevent worsening of your condition and complications


* To maintain your health at the optimal level


Directions to Meet Your Goals


*** Take your medications as prescribed


*** Follow your dietary instruction


*** Follow activity as directed








*** Keep your appointments as scheduled


*** Take your immunizations and boosters as scheduled


*** If your symptoms worsen call your PCP, if no PCP go to Urgent Care Center 

or Emergency Room***


*** Smoking is Dangerous to Your Health. Avoid second hand smoke***


***Call the 24-hour hour crisis hotline for domestic abuse at 1-395.818.7989***











Kendall Fitzpatrick MD May 1, 2018 18:12

## 2018-05-01 NOTE — HHI.PR
Subjective


Remarks


Follow-up leg pain, GI bleed, CAD.  The patient is still having pain in his 

left leg.  He states that he has been up out of bed and is waiting to work with 

physical therapy today.  Denies chest pain or dyspnea.





Objective


Vitals





Vital Signs








  Date Time  Temp Pulse Resp B/P (MAP) Pulse Ox O2 Delivery O2 Flow Rate FiO2


 


5/1/18 08:06 97.4 70 20 114/66 (82) 98   


 


5/1/18 08:00  61      


 


5/1/18 04:40 97.5 67 18 123/58 (79) 99   


 


5/1/18 04:00  59      


 


5/1/18 00:00  82      


 


4/30/18 23:45 97.1 74 18 120/68 (85) 98   


 


4/30/18 20:28      Room Air  


 


4/30/18 20:18 97.9 71 18 137/64 (88) 98   


 


4/30/18 20:00  67      


 


4/30/18 16:06 97.6 69 17 111/65 (80) 100   


 


4/30/18 16:00  68      














I/O      


 


 4/30/18 4/30/18 4/30/18 5/1/18 5/1/18 5/1/18





 06:59 14:59 22:59 06:59 14:59 22:59


 


Intake Total 240 ml  840 ml 240 ml  


 


Output Total 700 ml  600 ml 0 ml  


 


Balance -460 ml  240 ml 240 ml  


 


      


 


Intake Oral 240 ml  840 ml 240 ml  


 


Output Urine Total 700 ml  600 ml 0 ml  


 


# Voids    0  


 


# Bowel Movements 0  0 0  








Result Diagram:  


4/28/18 0352                                                                   

             4/28/18 0352





Imaging





Last Impressions








Chest X-Ray 4/29/18 0000 Signed





Impressions: 





 Service Date/Time:  Sunday, April 29, 2018 10:38 - CONCLUSION:  Mild 





 interstitial prominence is new from previous study.     Giovany Strauss MD 


 


Abdomen X-Ray 4/29/18 0000 Signed





Impressions: 





 Service Date/Time:  Sunday, April 29, 2018 10:43 - CONCLUSION: No acute 

disease. 





       Giovany Strauss MD 


 


GI Bleed Scan Nuclear Medicine 4/24/18 0000 Signed





Impressions: 





 Service Date/Time:  Tuesday, April 24, 2018 14:42 - CONCLUSION: No findings of 





 active GI bleed.     Gal Mayfield MD 


 


Abdomen/Pelvis CT 4/22/18 1947 Signed





Impressions: 





 Service Date/Time:  Sunday, April 22, 2018 20:13 - CONCLUSION:  1. No acute 





 finding is identified to explain the clinical symptoms. 2. Nonacute findings 





 include cholelithiasis, severe atherosclerotic disease, and prostatomegaly.   

  





 Adi Niño MD 








Objective Remarks


General: No acute distress.


Heart: Regular rate and rhythm.  2/6 systolic murmur.


Lungs: Clear to auscultation bilaterally. No wheezes, rales, or rhonchi. 

Breathing is nonlabored.


Abdomen: Soft, nontender, nondistended.


Extremities: 1+ bilateral ankle edema.  SCDs.


Psych: Alert and oriented.


Neuro: Normal speech.  No focal deficits noted.


Procedures


None


Urinary Catheter:  No


Vascular Central Line Catheter:  No





A/P


Assessment and Plan


1.  GI bleed, symptomatic anemia: Status post transfusion of 2 units PRBCs.  H&

H improved.  Appreciate GI recommendations.  Patient was scheduled for capsule 

endoscopy as an outpatient.  Bleeding scan was negative.  Continue oral PPI.


2.  Non-ST elevation MI: Likely secondary to severe anemia.  Patient has known 

history of coronary artery disease.  Status post cardiac catheterization on 4/1/ 18, which showed severe aortic stenosis with total occlusion of nondominant 

right coronary artery and 50% stenosis of the distal circumflex.  Medical 

management was recommended at that time.  Appreciate cardiology 

recommendations.  Aspirin, Plavix on hold secondary to GI bleed.  No 

anticoagulation at this time secondary to bleeding.  Beta-blocker on hold.


3.  Hypertension: Continue Coreg.  Lisinopril on hold.


4.  Hyperlipidemia: Not on medications.


5.  Diabetes mellitus: Monitor Accu-Cheks and cover with sliding scale insulin.


6.  Acute kidney injury superimposed on chronic kidney disease stage III: 

Improved.  Monitor BUN and creatinine.


7.  DVT prophylaxis: SCDs.  Chemical prophylaxis on hold secondary to GI bleed.


8.  Severe aortic stenosis: Not a candidate for surgery at this time.


9.  Cardiomyopathy: Ejection fraction 30-35%.  Appreciate cardiology 

recommendations.  Continue medical management.  Continue Lasix.


10.  Appreciate palliative care assistance.


Discharge Planning


Plan for discharge to SNF when arrangements can be made.  Case management 

assisting with discharge planning.











Kendall Fitzpatrick MD May 1, 2018 13:24

## 2023-06-23 NOTE — MB
----- Message from Natalie Gilbert sent at 6/23/2023  8:28 AM CDT -----  Contact: [phone]  [employer] is requesting an order for a diabetic meter. Patient stated the previous meter is lost or broken and needs a replacement. Please fax to [phone]. Please call them back at [phone]. Thanks     cc:

Radha Mason MD

****

 

 

DATE:

04/23/2018

 

REASON FOR CONSULTATION:

Non-ST elevation myocardial infarction.

 

HISTORY OF PRESENT ILLNESS:

Mr. Cho is a 77-year-old man who does have a history of severe 

aortic stenosis and moderate RCA disease.  The patient has had 

difficulties with anemia and getting stabilized for aortic valve 

replacement.  He was admitted a couple weeks ago and underwent a 

cardiac catheterization which revealed a 50% stenosis of the distal 

circumflex and a totally occluded nondominant RCA.  Again, the patient

needed optimization with respect to dental work and more importantly 

GI/anemia.  The patient was placed on Plavix to see if he would be 

able to tolerate Plavix for a potential TAVR procedure.  The patient 

also reports that since his last discharge, he had done fairly well 

from a GI perspective.  He had some abdominal bloating/indigestion 

that precipitated his visit.  Per the records, he also had some 

emesis.

 

PAST MEDICAL HISTORY:

Significant for hypertension, hyperlipidemia, diabetes, cardiomyopathy

with an EF of 30 to 35%.  Catheterization again shows total occlusion 

of an RCA with left to right collaterals and a 50% stenosis of the 

distal circumflex.

 

CURRENT MEDICATIONS:

Per the record.

 

REVIEW OF SYSTEMS:

Except as mentioned in the HPI, all 12 systems are negative.

 

FAMILY HISTORY:

Noncontributory.

 

SOCIAL HISTORY:

The patient does not drink or smoke.

 

PHYSICAL EXAMINATION:

VITAL SIGNS:  96, 106/70 with a respiratory rate of 20.

GENERAL:  He is an overweight man who is in no apparent distress.

NECK:  Free from JVD.

LUNGS:  Bilaterally clear to auscultation.

CARDIOVASCULAR:  He has a harsh systolic ejection murmur.  No rubs or 

gallops are appreciated.

ABDOMEN:  Soft.

EXTREMITIES:  Free from edema.

 

LABORATORY DATA:

Significant for an initial hemoglobin of 5.5.  It is 8.9 today.  His 

troponin peak is greater than 40.  His creatinine is 2.03.

 

IMPRESSIONS:

1.  Non-ST elevation myocardial infarction - This is most likely a 

secondary event with this profound anemia.  He does have a preexisting

coronary artery disease as was recently defined in the cath lab.  As 

well, he has severe aortic stenosis and at this point has failed the 

Plavix trial.  The Plavix obviously will need to be stopped as well as

any anticoagulation.  I do agree with transfusion that he has received

to treat the inciting factor.  Heparin and aspirin are felt relatively

contraindicated at this point.  Beta blockers and ACE inhibitors are 

also felt relatively contraindicated with his hypotension.  He will be

managed conservatively.

2.  Severe aortic stenosis - The patient, I do not believe, is a 

realistic candidate at this point.  He will have to have the GI source

of bleeding found and stabilized prior to considering any TAVR 

procedure.  As well, he has to have his teeth removed.

3.  Cardiomyopathy - As above.  The patient is not going to be able to

tolerate a beta blocker or ACE inhibitor at this point.

4.  Gastrointestinal bleed - Should the patient need any GI 

procedures, he will obviously be at high risk from a cardiac 

perspective.  This does not mean, however, that it is totally 

unreasonable although I cannot reduce the risk.

 

 

 

 

__________________________________

Radha Mason MD

 

 

BAB/DL

D: 04/23/2018, 08:14 AM

T: 04/23/2018, 08:35 AM

Visit #: [id number]

Job #: [id number]

## 2025-02-20 NOTE — PD
HPI


Chief Complaint:  GI Complaint


Time Seen by Provider:  17:21


Travel History


International Travel<30 days:  No


Contact w/Intl Traveler<30days:  No


Traveled to known affect area:  No





History of Present Illness


HPI


76yo M with PMH of DM, HTN, HLD, aortic stenosis, CAD presents to the ED with c/

o vomiting, diarrhea and abdominal pain for a few days.  Pt said he stopped 

taking his iron supplement but stool is still black.  Pt is having left sided 

chest pain as well and right now has chest pain and not abdominal pain.  Pain 

is left sided, pressure, not radiating and severe. Pt was recently admitted 4/2/ 18-4/6/18 and had cardiac cath that showed RCA occlusion and 50% stenosis of 

distal circumflex.   Pt was seen by GI at that admission and there was question 

about GI bleed vs. black stool from iron.  Pt has a capsule endoscopy scheduled 

as outpatient.  Seen by Dr. Sewell.  Denies any fever, sob,  focal weakness 

or numbness.





PFSH


Past Medical History


Blood Disorders:  Yes (anemia)


Anxiety:  Yes


Cancer:  No


Cardiac Catheterization:  Yes


Cardiovascular Problems:  Yes (EDEMA, CHF, AORITC STENOSIS, AVR, CAD, MI)


High Cholesterol:  Yes


Chest Pain:  Yes


Congestive Heart Failure:  Yes


Diabetes:  Yes


Patient Takes Glucophage:  Yes


Endocrine:  Yes


Gastrointestinal Disorders:  Yes (CKD)


Genitourinary:  No (CKD)


Hypertension:  Yes


Immune Disorder:  No


Implanted Vascular Access Dvce:  No


Musculoskeletal:  No


Neurologic:  No


Psychiatric:  Yes


Reproductive:  No


Respiratory:  Yes (SOB)





Past Surgical History


Body Medical Devices:  stent


Coronary Stent:  Yes


Other Surgery:  No





Social History


Alcohol Use:  No


Tobacco Use:  No


Substance Use:  No





Allergies-Medications


(Allergen,Severity, Reaction):  


Coded Allergies:  


     No Known Allergies (Verified  Allergy, Unknown, 4/22/18)


Reported Meds & Prescriptions





Reported Meds & Active Scripts


Active


Pantoprazole (Pantoprazole Sodium) 40 Mg Tab 40 Mg PO DAILY


Potassium Chloride Microencaps 20 Meq Tab 20 Meq PO DAILY


Furosemide 40 Mg Tab 40 Mg PO DAILY


Naproxen 250 Mg Tab 250 Mg PO Q12H PRN


     TAKE WITH FOOD


Aspirin DR (Aspirin) 81 Mg Tabdr 81 Mg PO DAILY


Coreg (Carvedilol) 3.125 Mg Tab 3.125 Mg PO Q12HR


Plavix (Clopidogrel Bisulfate) 75 Mg Tab 75 Mg PO DAILY


Vitamin B-12 (Cyanocobalamin) 1,000 Mcg Tab 1,000 Mcg PO DAILY


Lisinopril 5 Mg Tab 5 Mg PO DAILY


Folic Acid 0.4 Mg Tab 400 Mcg PO DAILY


Ferrous Sulfate 325 Mg (65 Mg Iron) Tablet 325 Mg PO DAILY








Review of Systems


Except as stated in HPI:  all other systems reviewed are Neg





Physical Exam


Narrative


GENERAL: 76yo M in moderate distress.


SKIN: Focused skin assessment warm/dry.


HEAD: Atraumatic. Normocephalic. 


EYES: Pupils equal and round. No scleral icterus. No injection or drainage. 


ENT: No nasal bleeding or discharge.  Mucous membranes pink and moist.


NECK: Trachea midline. No JVD. 


CARDIOVASCULAR: Tachycardic in the low 100s.   


RESPIRATORY: No accessory muscle use. Clear to auscultation. Breath sounds 

equal bilaterally. 


GASTROINTESTINAL: Abdomen soft, mild epigastric ttp.  No rebound tenderness or 

guarding.


RECTAL: Black stool, hemaprompt positive.


MUSCULOSKELETAL: No obvious deformities. No clubbing.  No cyanosis.  +Bilateral 

lower extremity edema. 


NEUROLOGICAL: Awake and alert. No obvious cranial nerve deficits.  Motor 

grossly within normal limits. Normal speech.


PSYCHIATRIC: Appropriate mood and affect; insight and judgment normal.





Data


Data


Last Documented VS





Vital Signs








  Date Time  Temp Pulse Resp B/P (MAP) Pulse Ox O2 Delivery O2 Flow Rate FiO2


 


4/22/18 18:00  110 18 118/56 (76) 98 Room Air  


 


4/22/18 17:08 97.8       








Orders





 Orders


Complete Blood Count With Diff (4/22/18 17:33)


Lipase (4/22/18 17:33)


Prothrombin Time / Inr (Pt) (4/22/18 17:33)


Act Partial Throm Time (Ptt) (4/22/18 17:33)


Urinalysis - C+S If Indicated (4/22/18 17:33)


Type And Screen (4/22/18 17:33)


Chest, Single Ap (4/22/18 17:33)


Ondansetron Inj (Zofran Inj) (4/22/18 17:45)


Sodium Chloride 0.9... W/Pantoprazole In (4/22/18 17:33)


Sodium Chloride 0.9... W/Pantoprazole In (4/22/18 17:33)


Troponin I (4/22/18 17:33)


Comprehensive Metabolic Panel (4/22/18 17:33)


Red Blood Cells (Rbc) (4/22/18 19:00)


Blood Product Administration (4/22/18 19:00)


Sodium Chlor 0.9% 250 Ml Inj (Ns 250 Ml (4/22/18 19:00)


Furosemide Inj (Lasix Inj) (4/22/18 19:00)


Admit Order (Ed Use Only) (4/22/18 19:33)





Labs





Laboratory Tests








Test


  4/22/18


18:07


 


White Blood Count 12.6 TH/MM3 


 


Red Blood Count 2.03 MIL/MM3 


 


Hemoglobin 5.5 GM/DL 


 


Hematocrit 17.4 % 


 


Mean Corpuscular Volume 85.8 FL 


 


Mean Corpuscular Hemoglobin 27.0 PG 


 


Mean Corpuscular Hemoglobin


Concent 31.5 % 


 


 


Red Cell Distribution Width 21.4 % 


 


Platelet Count 390 TH/MM3 


 


Mean Platelet Volume 9.6 FL 


 


Neutrophils (%) (Auto) 91.6 % 


 


Lymphocytes (%) (Auto) 3.1 % 


 


Monocytes (%) (Auto) 5.0 % 


 


Eosinophils (%) (Auto) 0.0 % 


 


Basophils (%) (Auto) 0.3 % 


 


Neutrophils # (Auto) 11.6 TH/MM3 


 


Lymphocytes # (Auto) 0.4 TH/MM3 


 


Monocytes # (Auto) 0.6 TH/MM3 


 


Eosinophils # (Auto) 0.0 TH/MM3 


 


Basophils # (Auto) 0.0 TH/MM3 


 


CBC Comment DIFF FINAL 


 


Differential Comment  


 


Prothrombin Time 11.4 SEC 


 


Prothromb Time International


Ratio 1.1 RATIO 


 


 


Activated Partial


Thromboplast Time 22.9 SEC 


 


 


Blood Urea Nitrogen 36 MG/DL 


 


Creatinine 2.07 MG/DL 


 


Random Glucose 160 MG/DL 


 


Total Protein 6.8 GM/DL 


 


Albumin 3.8 GM/DL 


 


Calcium Level 9.1 MG/DL 


 


Alkaline Phosphatase 72 U/L 


 


Aspartate Amino Transf


(AST/SGOT) 32 U/L 


 


 


Alanine Aminotransferase


(ALT/SGPT) 30 U/L 


 


 


Total Bilirubin 0.6 MG/DL 


 


Sodium Level 131 MEQ/L 


 


Potassium Level 5.1 MEQ/L 


 


Chloride Level 99 MEQ/L 


 


Carbon Dioxide Level 15.3 MEQ/L 


 


Anion Gap 17 MEQ/L 


 


Estimat Glomerular Filtration


Rate 31 ML/MIN 


 


 


Troponin I 2.45 NG/ML 


 


Lipase 316 U/L 











MDM


Medical Decision Making


Medical Screen Exam Complete:  Yes


Emergency Medical Condition:  Yes


Interpretation(s)


EKG: Sinus tachycardia at 110bpm.  LAD.  TWI I, aVL, II, V4-V6.


Differential Diagnosis


GI bleed vs. ischemic chest pain vs. anemia


Narrative Course


76yo M with c/o vomiting, abdominal pain, black stool and chest pain.  Pt is 

mildly tachycardic and hemaprompt positive.  Pt place on a protonix bolus and 

drip.  Labs are still pending at this time, sign out to Dr. Plummer to follow labs

, CT and admit patient.





[facility] Point of Care


Internal Pos. & Neg. Controls:  Passed


Fecal Specimen Occult Blood:  Positive





Diagnosis





 Primary Impression:  


 GI bleed


 Qualified Codes:  K92.2 - Gastrointestinal hemorrhage, unspecified





Admitting Information


Admitting Physician Requests:  Admit











Shante Lozano DO Apr 22, 2018 17:52 Patient called inquiring to the status of her injection. Patient updated that insurance authorization is still pending. Will call to schedule as soon as authorization is obtained.